# Patient Record
Sex: FEMALE
[De-identification: names, ages, dates, MRNs, and addresses within clinical notes are randomized per-mention and may not be internally consistent; named-entity substitution may affect disease eponyms.]

---

## 2018-07-13 ENCOUNTER — HOSPITAL ENCOUNTER (INPATIENT)
Dept: HOSPITAL 31 - C.ER | Age: 58
LOS: 7 days | Discharge: HOME | DRG: 885 | End: 2018-07-20
Attending: PSYCHIATRY & NEUROLOGY | Admitting: PSYCHIATRY & NEUROLOGY
Payer: MEDICARE

## 2018-07-13 VITALS — OXYGEN SATURATION: 98 %

## 2018-07-13 DIAGNOSIS — F41.8: ICD-10-CM
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
## 2018-07-13 DIAGNOSIS — F10.230: ICD-10-CM

## 2018-07-13 DIAGNOSIS — F33.2: Primary | ICD-10-CM

## 2018-07-13 DIAGNOSIS — R45.851: ICD-10-CM

## 2018-07-13 DIAGNOSIS — G47.00: ICD-10-CM

## 2018-07-13 PROCEDURE — HZ2ZZZZ DETOXIFICATION SERVICES FOR SUBSTANCE ABUSE TREATMENT: ICD-10-PCS | Performed by: PSYCHIATRY & NEUROLOGY

## 2018-07-13 PROCEDURE — GZHZZZZ GROUP PSYCHOTHERAPY: ICD-10-PCS | Performed by: PSYCHIATRY & NEUROLOGY

## 2018-07-13 PROCEDURE — HZ56ZZZ INDIVIDUAL PSYCHOTHERAPY FOR SUBSTANCE ABUSE TREATMENT, PSYCHOEDUCATION: ICD-10-PCS | Performed by: PSYCHIATRY & NEUROLOGY

## 2018-07-13 PROCEDURE — GZ56ZZZ INDIVIDUAL PSYCHOTHERAPY, SUPPORTIVE: ICD-10-PCS | Performed by: PSYCHIATRY & NEUROLOGY

## 2018-07-13 PROCEDURE — HZ59ZZZ INDIVIDUAL PSYCHOTHERAPY FOR SUBSTANCE ABUSE TREATMENT, SUPPORTIVE: ICD-10-PCS | Performed by: PSYCHIATRY & NEUROLOGY

## 2018-07-13 NOTE — C.PDOC
History Of Present Illness


56yo female, transferred in for admission after she was pre-screened in an 

outpatient facility for depression. Patient currently denies any suicidal or 

homicidal ideation. She has no medical complaints. 


Time Seen by Provider: 18 16:40


Chief Complaint (Nursing): Psychiatric Evaluation


History/Exam Limitations: no limitations


Onset/Duration Of Symptoms: Days


Current Symptoms Are (Timing): Still Present


Associated Symptoms: Depression





Past Medical History


Reviewed: Historical Data, Nursing Documentation, Vital Signs


Vital Signs: 


 Last Vital Signs











Temp  98.3 F   18 06:36


 


Pulse  75   18 09:36


 


Resp  18   18 06:36


 


BP  118/74   18 09:36


 


Pulse Ox  98   18 06:00














- Medical History


PMH: No Chronic Diseases


Surgical History: No Surg Hx


Family History: States: No Known Family Hx





- Social History


Hx Alcohol Use: Yes


Hx Substance Use: No





Review Of Systems


Except As Marked, All Systems Reviewed And Found Negative.


Constitutional: Negative for: Fever, Chills


Cardiovascular: Negative for: Chest Pain


Respiratory: Negative for: Shortness of Breath


Gastrointestinal: Negative for: Abdominal Pain


Psych: Positive for: Depression.  Negative for: Suicidal ideation





Physical Exam





- Physical Exam


Appears: Non-toxic


Skin: Normal Color


Head: Normacephalic


Eye(s): bilateral: Normal Inspection


Neck: Supple


Chest: Symmetrical


Cardiovascular: Rhythm Regular


Respiratory: Normal Breath Sounds


Gastrointestinal/Abdominal: Normal Exam, Soft, No Tenderness


Back: Normal Inspection


Extremity: Normal ROM


Neurological/Psych: Oriented x3, Other (flat affect)





ED Course And Treatment


O2 Sat by Pulse Oximetry: 98 (RA)


Pulse Ox Interpretation: Normal





Medical Decision Making


Medical Decision Makin


patient admitted under Dr. Garcia for suicidal ideation.





Disposition


Doctor Will See Patient In The: Hospital


Counseled Patient/Family Regarding: Studies Performed, Diagnosis





- Disposition


Disposition: HOSPITALIZED


Disposition Time: 06:30


Condition: GOOD





- Clinical Impression


Clinical Impression: 


 Depression








- Scribe Statement


The provider has reviewed the documentation as recorded by the Josiah Garcia


Provider Attestation: 


All medical record entries made by the Josiah were at my direction and 

personally dictated by me. I have reviewed the chart and agree that the record 

accurately reflects my personal performance of the history, physical exam, 

medical decision making, and the department course for this patient. I have 

also personally directed, reviewed, and agree with the discharge instructions 

and disposition.
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
## 2018-07-15 NOTE — PCM.PYCHPN
Psychiatric Progress Note





- Psychiatric Progress Note


Patient seen today, length of contact: 15 min


Patient Chief Complaint: 





I  was feeling depressed and suicidal.'


Problems Identified/Issues Discussed: 








Patient seen and evaluated, chart reviewed and discussed with the nurse. 


Pt reports depressed mood, feelings of hopelessness and helplessness. 


She remained isolated and withdrawn, and confined to her room.  


She still reports withdrawal symptoms.


She denies any AVH or any paranoia.


Patient is compliant with medications and denies any side effects. 


Symptoms are improving but need more time to stabilize. 


Support and psychoeducation given. 


Medication Change: Yes


Medical Record Reviewed: Yes





Mental Status Examination





- Cognitive Function


Orientation: Person, Place, Situation, Time


Memory: Intact


Attention: WNL


Concentration: Poor


Association: WNL


Fund of Knowledge: Poor





- Mood


Mood: Depressed, Anxious





- Affect


Affect: Broad, Constricted, Depressed





- Speech


Speech: Soft





- Formal Thought Process


Formal Thought Process: No Impairment





- Suicidal Ideation


Suicidal Ideation: No





- Homicidal Ideation


Homicidal Ideation: No





Goal/Treatment Plan





- Goal/Treatment Plan


Need for Continued Stay: Severe depression anxiety, Severe functional impairment


Progress Toward Problem(s) and Goals/Treatment Plan: 








Major depressive disorder recurrent severe without psychotic features


Alcohol use disorder severe


Alcohol withdrawal








-Psychotherapy


-Supportive therapy, group therapy, individual therapy


-Atarax 25 mg PO Q6 prn


-Wellbutrin 75 mg PO Daily 


-Trazodone 50 mg PO QHS prn


-Ativan prn


-Multivitamin/FA/Thiamine





- Smoking Cessation


Smoking Cessation Initiated: No

## 2018-07-16 RX ADMIN — Medication SCH TAB: at 10:29

## 2018-07-16 NOTE — PCM.PYCHPN
Psychiatric Progress Note





- Psychiatric Progress Note


Patient seen today, length of contact: 15 min


Patient Chief Complaint: 


"I am just here"


Problems Identified/Issues Discussed: 


Patient seen, chart discussed and reviewed with the nurse. The patient was seen 

in treatment team meeting. The patient states that she is feeling depressed and 

still has thoughts of hurting herself. She states that she still has trouble 

sleeping. She states that her appetite is better since coming to the hospital. 


Symptoms are improving but needs more time to stabilize. 





Mental Status Examination





- Cognitive Function


Orientation: Person, Place, Situation, Time





- Mood


Mood: Depressed, Anxious





- Affect


Affect: Broad, Constricted, Depressed





- Speech


Speech: Appropriate





- Formal Thought Process


Formal Thought Process: No Impairment





- Suicidal Ideation


Suicidal Ideation: Yes





- Homicidal Ideation


Homicidal Ideation: No





Goal/Treatment Plan





- Goal/Treatment Plan


Progress Toward Problem(s) and Goals/Treatment Plan: 


Psychotherapy, supportive therapy, group therapy, individual therapy


Atarax 25 mg PO Q6 PRN


Wellbutrin 75 mg PO daily


Trazodone 50 mg PO PRN


Ativan prn 


Multivitamin/FA/ Thiamine

## 2018-07-16 NOTE — PCM.BM
<AjDemetrice - Last Filed: 07/16/18 18:26>





Treatment Plan Problems





- Problems identified on initial assessmt


  ** Depression 


Date Initiated: 07/13/18


Time Initiated: 21:00


Assessment reference: NA


Status: Active





  ** Suicidal Ideations 


Date Initiated: 07/13/18


Time Initiated: 22:20


Assessment reference: NA


Status: Active





Treatment assets and liabiliti


Patient Assests: cooperative, self-reliant, ADL independent, negotiates basic 

needs


Patient Liabilities: live alone, financial problems, relationship conflicts, 

substance abuse, medical problems





- Milieu Protocol


Maintain good personal hygiene: daily Encourage regular showers, daily Remind 

patient to perform daily oral care, daily Assist patient to perform ADL's


Conduct patient checks and document Observation sheet: Q15 minutes


Maintain personal safety: every shift Educate patient to report safety concerns 

to staff, every shift Monitor environment for contraband/sharps


Medication safety: Monitor for expected outcome, potential side effects: every 

shift, Assess barriers to learning: every shift, Assess readiness for 

medication education: every shift


Milieu Narrative: 


Psychotherapy, supportive therapy, group therapy, individual therapy


Atarax 25 mg PO Q6 PRN


Wellbutrin 75 mg PO daily


Trazodone 50 mg PO PRN


Ativan prn 


Multivitamin/FA/ Thiamine 





Discharge/Continuing Care





- Treatment Team Participation


Patient/Family/SO Statement: 


Psychotherapy, supportive therapy, group therapy, individual therapy


Atarax 25 mg PO Q6 PRN


Wellbutrin 75 mg PO daily


Trazodone 50 mg PO PRN


Ativan prn 


Multivitamin/FA/ Thiamine 





<Rhonda Woodard - Last Filed: 07/17/18 16:47>





Family Contact


Family involvement: Patient does not wish Family/SO involvement


Family contact: Patient declines to allow family contact at present





- Goals for Treatment


Patient goals for treatment: " I want to be referred to a partial care program.

"



Discharge/Continuing Care





- Education Needs


Education Needs: Patient Medication, Patient Diagnosis/Disease Process, Patient 

Coping Skills, Patient Anger Management skills, Patient Placement options, 

Patient Community resources, Patient Activities of Daily Living





- Discharge


Discharge Criteria: Free of Suicidal thoughts, Normal sleep pattern, Ability to 

care for self, No longer exhibiting s/s of withdrawal, Reduction of target 

symptoms


Discharge to:: Shelter





- Treatment Team Participation


Discussed with Family/SO: No


Was Patient/Family/SO present at Treatment Team Meeting: Yes

## 2018-07-17 RX ADMIN — Medication SCH TAB: at 10:29

## 2018-07-17 NOTE — PCM.PYCHPN
Psychiatric Progress Note





- Psychiatric Progress Note


Patient seen today, length of contact: 15 min


Patient Chief Complaint: 


"I am ok"


Problems Identified/Issues Discussed: 


Patient seen chart discussed and reviewed with nurse. The patient states that 

she still has depressed mood. She states that she slept better overnight. The 

patient admits to still having thoughts of suicide. She denies any audio 

hallucinations. She requested a nicotine patch as she feels she is withdrawing. 


Symptoms are improving but needs more time to stabilize. 





Mental Status Examination





- Cognitive Function


Orientation: Person, Place, Situation, Time





- Mood


Mood: Depressed, Anxious





- Affect


Affect: Broad, Constricted, Depressed





- Speech


Speech: Appropriate





- Formal Thought Process


Formal Thought Process: No Impairment





- Suicidal Ideation


Suicidal Ideation: Yes





- Homicidal Ideation


Homicidal Ideation: No





Goal/Treatment Plan





- Goal/Treatment Plan


Progress Toward Problem(s) and Goals/Treatment Plan: 


Psychotherapy supportive therapy, group therapy, individual therapy


Atarax 25 mg PO Q6 PRN


Lexapro 5mg  


Trazodone 50 mg PO PRN


Ativan prn 


Multivitamin/FA/ Thiamine 


Nicotine patch


Encourage self care


Encourage participation in groups 


All risks and benefits of medications discussed with the patient

## 2018-07-18 RX ADMIN — Medication SCH TAB: at 10:05

## 2018-07-18 NOTE — PCM.PYCHPN
Psychiatric Progress Note





- Psychiatric Progress Note


Patient seen today, length of contact: 15 min


Patient Chief Complaint: 


"I am fine"


Problems Identified/Issues Discussed: 


Patient seen chart discussed and reviewed with nurse.  Patient seen chart 

discussed and reviewed with nurse. The patient states that she still has 

depressed mood. She states that she slept better overnight. The patient admits 

to still having thoughts of suicide. She states that if she were to leave the 

hospital now she would go to the liquor store. Support and psychoeducation 

provided. The patient was encouraged to leave her room and interact with the 

other patients on the unit.


Medication Change: No


Medical Record Reviewed: Yes





Mental Status Examination





- Cognitive Function


Orientation: Person, Place, Situation, Time


Memory: Intact


Attention: Poor





- Mood


Mood: Depressed, Anxious





- Affect


Affect: Broad, Constricted, Depressed





- Speech


Speech: Appropriate





- Formal Thought Process


Formal Thought Process: No Impairment





- Suicidal Ideation


Suicidal Ideation: Yes





- Homicidal Ideation


Homicidal Ideation: No





Goal/Treatment Plan





- Goal/Treatment Plan


Progress Toward Problem(s) and Goals/Treatment Plan: 


Psychotherapy supportive therapy, group therapy, individual therapy


Atarax 25 mg PO Q6 PRN


Lexapro 5mg  


Trazodone 50 mg PO PRN


Ativan prn 


Multivitamin/FA/ Thiamine 


Nicotine patch


Encourage self care


Encourage participation in groups 


All risks and benefits of medications discussed with the patient 





15 min

## 2018-07-18 NOTE — RAD
Date of service: 



07/18/2018



HISTORY:

Discharge  



COMPARISON:

No prior.



TECHNIQUE:

Chest PA and lateral



FINDINGS:



LUNGS:

No active pulmonary disease.



PLEURA:

No significant pleural effusion identified. No pneumothorax apparent.



CARDIOVASCULAR:

Normal.



OSSEOUS STRUCTURES:

Solitary [place] incompletely visualized 



Thoracolumbar scoliosis. 



VISUALIZED UPPER ABDOMEN:

Normal.



OTHER FINDINGS:

None.



IMPRESSION:

No active disease.

## 2018-07-19 VITALS — RESPIRATION RATE: 18 BRPM

## 2018-07-19 RX ADMIN — Medication SCH TAB: at 10:04

## 2018-07-19 NOTE — PCM.PYCHPN
Psychiatric Progress Note





- Psychiatric Progress Note


Patient seen today, length of contact: 15 min


Patient Chief Complaint: 


"I am fine"


Problems Identified/Issues Discussed: 


Patient seen chart discussed and reviewed with nurse.  Patient seen chart 

discussed and reviewed with nurse. The patient states that she still has 

depressed mood. She states that she slept better overnight. The patient admits 

to still having thoughts of suicide. She states that if she were to leave the 

hospital now she would go to the liquor store. Support and psychoeducation 

provided. The patient was encouraged to leave her room and interact with the 

other patients on the unit.


Medication Change: Yes


Medical Record Reviewed: Yes





Mental Status Examination





- Cognitive Function


Orientation: Person, Place, Situation, Time


Memory: Intact


Attention: WNL


Concentration: Poor


Association: WNL


Fund of Knowledge: Poor





- Mood


Mood: Depressed, Anxious





- Affect


Affect: Broad, Constricted, Depressed





- Speech


Speech: Soft





- Formal Thought Process


Formal Thought Process: No Impairment





- Suicidal Ideation


Suicidal Ideation: No





- Homicidal Ideation


Homicidal Ideation: No





Goal/Treatment Plan





- Goal/Treatment Plan


Need for Continued Stay: Severe depression anxiety, Severe functional impairment


Progress Toward Problem(s) and Goals/Treatment Plan: 


Psychotherapy supportive therapy, group therapy, individual therapy


Atarax 25 mg PO Q6 PRN


Lexapro 5mg  


Trazodone 50 mg PO PRN


Ativan prn 


Multivitamin/FA/ Thiamine 


Nicotine patch


Encourage self care


Encourage participation in groups 


All risks and benefits of medications discussed with the patient 





15 min

## 2018-07-19 NOTE — CARD
--------------- APPROVED REPORT --------------





Date of service: 07/18/2018



EKG Measurement

Heart [id number]

MT [id number]

[id number]

[id number]

[id number]



<Conclusion>

Normal sinus rhythm

Normal ECG

## 2018-07-20 VITALS — DIASTOLIC BLOOD PRESSURE: 74 MMHG | HEART RATE: 75 BPM | SYSTOLIC BLOOD PRESSURE: 118 MMHG

## 2018-07-20 VITALS — TEMPERATURE: 98.3 F

## 2018-07-20 RX ADMIN — Medication SCH TAB: at 09:10

## 2018-07-20 NOTE — PCM.PYCHDC
Mental Status Examination





- Mental Status Examination


Orientation: Person





Discharge Summary





- Discharge Note


Consultations:: List each consultation separately and include:  1. Reason for 

request.  2. Findings.  3. Follow-up


Summary of Hospital Course include:: 1. Description of specific treatment plan 

utilized for patients during their course of treatmen.  2. Summarize the time-

course for resolution of acute symptoms and/or regressed behaviors.  3. 

Describe issues identified and worked on during hospitalization.  4. Describe 

medication utilized.  5. Describe medical problems identified and treated.  6. 

Reassessment of suicide risk


Summary of Hospital Course: 











She went to [facility] rehab.





- Final Diagnosis (DSM 5)


Condition upon Discharge: GOOD


Disposition: HOME/ ROUTINE


Follow-up Treatment Plan: 


Psychotherapy supportive therapy, group therapy, individual therapy


Atarax 25 mg PO Q6 PRN


Lexapro 5mg  


Trazodone 50 mg PO PRN


Ativan prn 


Multivitamin/FA/ Thiamine 


Nicotine patch


Encourage self care


Encourage participation in groups 


All risks and benefits of medications discussed with the patient 





15 min


Prescriptions/Medication Reconciliation: 


Escitalopram [Lexapro] 10 mg PO DAILY #30 tab


traZODone [Desyrel] 100 mg PO HS PRN #30 tab


 PRN Reason: Insomnia

## 2019-05-17 ENCOUNTER — OFFICE VISIT (OUTPATIENT)
Dept: FAMILY MEDICINE CLINIC | Facility: CLINIC | Age: 59
End: 2019-05-17
Payer: MEDICARE

## 2019-05-17 VITALS
OXYGEN SATURATION: 94 % | HEART RATE: 84 BPM | TEMPERATURE: 99.4 F | RESPIRATION RATE: 22 BRPM | SYSTOLIC BLOOD PRESSURE: 108 MMHG | DIASTOLIC BLOOD PRESSURE: 56 MMHG | WEIGHT: 108 LBS

## 2019-05-17 DIAGNOSIS — J44.9 CHRONIC OBSTRUCTIVE PULMONARY DISEASE, UNSPECIFIED COPD TYPE (HCC): ICD-10-CM

## 2019-05-17 DIAGNOSIS — J20.9 ACUTE BRONCHITIS, UNSPECIFIED ORGANISM: Primary | ICD-10-CM

## 2019-05-17 PROCEDURE — 99213 OFFICE O/P EST LOW 20 MIN: CPT | Performed by: NURSE PRACTITIONER

## 2019-05-17 RX ORDER — GABAPENTIN 300 MG/1
400 CAPSULE ORAL 3 TIMES DAILY
COMMUNITY

## 2019-05-17 RX ORDER — BUPROPION HYDROCHLORIDE 300 MG/1
300 TABLET ORAL DAILY
COMMUNITY

## 2019-05-17 RX ORDER — ALBUTEROL SULFATE 90 UG/1
2 AEROSOL, METERED RESPIRATORY (INHALATION) EVERY 6 HOURS PRN
Qty: 1 INHALER | Refills: 0 | Status: SHIPPED | OUTPATIENT
Start: 2019-05-17 | End: 2022-05-23 | Stop reason: SDUPTHER

## 2019-05-17 RX ORDER — QUETIAPINE FUMARATE 25 MG/1
25 TABLET, FILM COATED ORAL 4 TIMES DAILY PRN
COMMUNITY

## 2019-05-17 RX ORDER — AZITHROMYCIN 250 MG/1
500 TABLET, FILM COATED ORAL EVERY 24 HOURS
Qty: 10 TABLET | Refills: 0 | Status: SHIPPED | OUTPATIENT
Start: 2019-05-17 | End: 2019-05-22

## 2019-05-17 RX ORDER — SERTRALINE HYDROCHLORIDE 100 MG/1
50 TABLET, FILM COATED ORAL DAILY
COMMUNITY

## 2019-05-17 RX ORDER — TRAZODONE HYDROCHLORIDE 100 MG/1
200 TABLET ORAL
COMMUNITY

## 2019-05-30 ENCOUNTER — OFFICE VISIT (OUTPATIENT)
Dept: FAMILY MEDICINE CLINIC | Facility: CLINIC | Age: 59
End: 2019-05-30
Payer: MEDICARE

## 2019-05-30 VITALS
RESPIRATION RATE: 18 BRPM | TEMPERATURE: 97.7 F | SYSTOLIC BLOOD PRESSURE: 132 MMHG | HEART RATE: 86 BPM | BODY MASS INDEX: 21.01 KG/M2 | OXYGEN SATURATION: 95 % | WEIGHT: 107 LBS | HEIGHT: 60 IN | DIASTOLIC BLOOD PRESSURE: 82 MMHG

## 2019-05-30 DIAGNOSIS — Z13.220 SCREENING FOR HYPERLIPIDEMIA: Primary | ICD-10-CM

## 2019-05-30 DIAGNOSIS — Z00.00 MEDICARE ANNUAL WELLNESS VISIT, INITIAL: ICD-10-CM

## 2019-05-30 PROCEDURE — G0438 PPPS, INITIAL VISIT: HCPCS | Performed by: FAMILY MEDICINE

## 2019-10-01 ENCOUNTER — APPOINTMENT (EMERGENCY)
Dept: RADIOLOGY | Facility: HOSPITAL | Age: 59
End: 2019-10-01
Payer: MEDICARE

## 2019-10-01 ENCOUNTER — HOSPITAL ENCOUNTER (EMERGENCY)
Facility: HOSPITAL | Age: 59
Discharge: HOME | End: 2019-10-01
Attending: EMERGENCY MEDICINE
Payer: MEDICARE

## 2019-10-01 VITALS
TEMPERATURE: 97.2 F | OXYGEN SATURATION: 95 % | SYSTOLIC BLOOD PRESSURE: 133 MMHG | RESPIRATION RATE: 18 BRPM | HEART RATE: 85 BPM | DIASTOLIC BLOOD PRESSURE: 72 MMHG

## 2019-10-01 DIAGNOSIS — M25.512 LEFT SHOULDER PAIN: Primary | ICD-10-CM

## 2019-10-01 PROCEDURE — 99283 EMERGENCY DEPT VISIT LOW MDM: CPT

## 2019-10-01 PROCEDURE — 73030 X-RAY EXAM OF SHOULDER: CPT

## 2019-10-01 PROCEDURE — 93005 ELECTROCARDIOGRAM TRACING: CPT

## 2019-10-01 PROCEDURE — 71046 X-RAY EXAM CHEST 2 VIEWS: CPT

## 2019-10-01 RX ORDER — NAPROXEN 250 MG/1
250 TABLET ORAL 2 TIMES DAILY WITH MEALS
Qty: 20 TABLET | Refills: 0 | Status: SHIPPED | OUTPATIENT
Start: 2019-10-01

## 2019-10-01 RX ORDER — CYCLOBENZAPRINE HCL 10 MG
10 TABLET ORAL 2 TIMES DAILY PRN
Qty: 20 TABLET | Refills: 0 | Status: SHIPPED | OUTPATIENT
Start: 2019-10-01 | End: 2022-05-23 | Stop reason: SDUPTHER

## 2019-10-01 NOTE — ED PROVIDER NOTES
History  Chief Complaint   Patient presents with    Shoulder Pain     pt presents to the ed with left shouler pain x 3 days  pt states its like a knife and wakes her up at night      Patient has had significant spinal surgery and has lifelong scoliosis  She states she has recently been having increased pain behind the left shoulder blade which she thinks is from over compensating  She states the pain is worse with certain positions and movement  It is also worse with movement of the left arm and that it affects her when she is trying to sleep on her side  She has had no shortness of breath or diaphoresis no nausea  The patient was seen by her therapist at [facility] today who advised her to go and get checked out  Prior to Admission Medications   Prescriptions Last Dose Informant Patient Reported? Taking? QUEtiapine (SEROquel) 25 mg tablet   Yes No   Sig: Take 25 mg by mouth 4 (four) times a day as needed   albuterol (VENTOLIN HFA) 90 mcg/act inhaler   No No   Sig: Inhale 2 puffs every 6 (six) hours as needed for wheezing   buPROPion (WELLBUTRIN XL) 300 mg 24 hr tablet   Yes No   Sig: Take 300 mg by mouth daily   gabapentin (NEURONTIN) 300 mg capsule   Yes No   Sig: Take 300 mg by mouth 3 (three) times a day   mometasone-formoterol (DULERA) 100-5 MCG/ACT inhaler   No No   Sig: Inhale 2 puffs 2 (two) times a day Rinse mouth after use     Patient not taking: Reported on 5/30/2019   sertraline (ZOLOFT) 100 mg tablet   Yes No   Sig: Take 50 mg by mouth daily   traZODone (DESYREL) 100 mg tablet   Yes No   Sig: Take 200 mg by mouth daily at bedtime as needed for sleep      Facility-Administered Medications: None       Past Medical History:   Diagnosis Date    Anxiety     Depression     Disease of thyroid gland     Substance abuse ([facility] [address] )        Past Surgical History:   Procedure Laterality Date    BACK SURGERY  01/01/1973    scolosis rods    ELBOW SURGERY  05/01/2018    WISDOM TOOTH EXTRACTION Family History   Problem Relation Age of Onset    Cancer Mother     Leukemia Father      I have reviewed and agree with the history as documented  Social History     Tobacco Use    Smoking status: Current Some Day Smoker     Packs/day: 0 50    Smokeless tobacco: Never Used   Substance Use Topics    Alcohol use: Not Currently     Comment: none 7/2018    Drug use: Not Currently        Review of Systems   Constitutional: Negative for fever  HENT: Negative for sore throat  Eyes: Negative for visual disturbance  Respiratory: Negative for cough and shortness of breath  Cardiovascular: Negative for chest pain  Gastrointestinal: Negative for abdominal pain  Genitourinary: Negative for dysuria  Musculoskeletal: Positive for arthralgias, back pain and myalgias  Negative for neck pain  Skin: Negative for rash  Neurological: Negative for light-headedness and headaches  Hematological: Does not bruise/bleed easily  Psychiatric/Behavioral: Negative for confusion  All other systems reviewed and are negative  Physical Exam  Physical Exam   Constitutional: She appears well-developed and well-nourished  HENT:   Head: Normocephalic and atraumatic  Eyes: Conjunctivae are normal    Neck: Normal range of motion  Neck supple  Cardiovascular: Normal rate, regular rhythm and normal heart sounds  Pulmonary/Chest: Effort normal and breath sounds normal    Abdominal: Soft  There is no tenderness  Musculoskeletal: Normal range of motion  She exhibits tenderness  She exhibits no edema  There is some tenderness in the left posterior ribs and scapular area  Neurological: She is alert  Skin: Skin is warm and dry  Capillary refill takes less than 2 seconds  Psychiatric: She has a normal mood and affect  Her behavior is normal    Nursing note and vitals reviewed        Vital Signs  ED Triage Vitals [10/01/19 1318]   Temperature Pulse Respirations Blood Pressure SpO2   (!) 97 2 °F (36 2 °C) 85 18 133/72 95 %      Temp Source Heart Rate Source Patient Position - Orthostatic VS BP Location FiO2 (%)   Tympanic Monitor -- -- --      Pain Score       --           Vitals:    10/01/19 1318   BP: 133/72   Pulse: 85         Visual Acuity      ED Medications  Medications - No data to display    Diagnostic Studies  Results Reviewed     None                 XR shoulder 2+ views LEFT    (Results Pending)   XR chest 2 views    (Results Pending)              Procedures  Procedures       ED Course                               MDM  Number of Diagnoses or Management Options  Diagnosis management comments: Will x-ray for possible bony involvement however I suspect muscle strain from compensation      Disposition  Final diagnoses:   Left shoulder pain     Time reflects when diagnosis was documented in both MDM as applicable and the Disposition within this note     Time User Action Codes Description Comment    10/1/2019  3:01 PM Sharyle Brunt Add [A34 448] Left shoulder pain       ED Disposition     ED Disposition Condition Date/Time Comment    Discharge Stable e Oct 1, 2019  3:01 PM Magdalena Aguirre discharge to home/self care  Follow-up Information     Follow up With Specialties Details Why Ben Echevarria MD Family Medicine Schedule an appointment as soon as possible for a visit   [facility]  [address] [place] [phone]            Patient's Medications   Discharge Prescriptions    CYCLOBENZAPRINE (FLEXERIL) 10 MG TABLET    Take 1 tablet (10 mg total) by mouth 2 (two) times a day as needed for muscle spasms       Start Date: 10/1/2019 End Date: --       Order Dose: 10 mg       Quantity: 20 tablet    Refills: 0    NAPROXEN (NAPROSYN) 250 MG TABLET    Take 1 tablet (250 mg total) by mouth 2 (two) times a day with meals       Start Date: 10/1/2019 End Date: --       Order Dose: 250 mg       Quantity: 20 tablet    Refills: 0     No discharge procedures on file      ED Provider  Electronically Signed by           Luis Lang MD  10/01/19 4801

## 2019-10-03 LAB
ATRIAL RATE: 76 BPM
P AXIS: 65 DEGREES
PR INTERVAL: 132 MS
QRS AXIS: 74 DEGREES
QRSD INTERVAL: 80 MS
QT INTERVAL: 400 MS
QTC INTERVAL: 450 MS
T WAVE AXIS: 71 DEGREES
VENTRICULAR RATE: 76 BPM

## 2019-10-03 PROCEDURE — 93010 ELECTROCARDIOGRAM REPORT: CPT | Performed by: INTERNAL MEDICINE

## 2020-02-15 ENCOUNTER — HOSPITAL ENCOUNTER (EMERGENCY)
Facility: HOSPITAL | Age: 60
Discharge: HOME | End: 2020-02-15
Attending: EMERGENCY MEDICINE | Admitting: EMERGENCY MEDICINE
Payer: MEDICARE

## 2020-02-15 ENCOUNTER — APPOINTMENT (EMERGENCY)
Dept: RADIOLOGY | Facility: HOSPITAL | Age: 60
End: 2020-02-15
Payer: MEDICARE

## 2020-02-15 VITALS
TEMPERATURE: 97.8 F | HEIGHT: 60 IN | OXYGEN SATURATION: 94 % | RESPIRATION RATE: 18 BRPM | BODY MASS INDEX: 21.6 KG/M2 | HEART RATE: 75 BPM | WEIGHT: 110 LBS | SYSTOLIC BLOOD PRESSURE: 123 MMHG | DIASTOLIC BLOOD PRESSURE: 63 MMHG

## 2020-02-15 DIAGNOSIS — J06.9 URI (UPPER RESPIRATORY INFECTION): ICD-10-CM

## 2020-02-15 DIAGNOSIS — J40 BRONCHITIS: Primary | ICD-10-CM

## 2020-02-15 LAB
FLUAV RNA NPH QL NAA+PROBE: NORMAL
FLUBV RNA NPH QL NAA+PROBE: NORMAL
RSV RNA NPH QL NAA+PROBE: NORMAL

## 2020-02-15 PROCEDURE — 99283 EMERGENCY DEPT VISIT LOW MDM: CPT

## 2020-02-15 PROCEDURE — 87631 RESP VIRUS 3-5 TARGETS: CPT | Performed by: EMERGENCY MEDICINE

## 2020-02-15 PROCEDURE — 94640 AIRWAY INHALATION TREATMENT: CPT | Performed by: EMERGENCY MEDICINE

## 2020-02-15 PROCEDURE — 71046 X-RAY EXAM CHEST 2 VIEWS: CPT

## 2020-02-15 PROCEDURE — 94640 AIRWAY INHALATION TREATMENT: CPT

## 2020-02-15 PROCEDURE — 99284 EMERGENCY DEPT VISIT MOD MDM: CPT | Performed by: EMERGENCY MEDICINE

## 2020-02-15 RX ORDER — PREDNISONE 20 MG/1
60 TABLET ORAL ONCE
Status: COMPLETED | OUTPATIENT
Start: 2020-02-15 | End: 2020-02-15

## 2020-02-15 RX ORDER — ALBUTEROL SULFATE 90 UG/1
2 AEROSOL, METERED RESPIRATORY (INHALATION) EVERY 4 HOURS PRN
Qty: 1 INHALER | Refills: 0 | Status: SHIPPED | OUTPATIENT
Start: 2020-02-15

## 2020-02-15 RX ORDER — BUPROPION HYDROCHLORIDE 150 MG/1
150 TABLET ORAL DAILY
COMMUNITY

## 2020-02-15 RX ORDER — AZITHROMYCIN 250 MG/1
500 TABLET, FILM COATED ORAL ONCE
Status: COMPLETED | OUTPATIENT
Start: 2020-02-15 | End: 2020-02-15

## 2020-02-15 RX ORDER — IPRATROPIUM BROMIDE AND ALBUTEROL SULFATE 2.5; .5 MG/3ML; MG/3ML
3 SOLUTION RESPIRATORY (INHALATION) ONCE
Status: COMPLETED | OUTPATIENT
Start: 2020-02-15 | End: 2020-02-15

## 2020-02-15 RX ORDER — HYDROXYZINE HYDROCHLORIDE 10 MG/1
10 TABLET, FILM COATED ORAL EVERY 6 HOURS PRN
COMMUNITY

## 2020-02-15 RX ORDER — PREDNISONE 20 MG/1
60 TABLET ORAL DAILY
Qty: 15 TABLET | Refills: 0 | Status: SHIPPED | OUTPATIENT
Start: 2020-02-15 | End: 2020-02-20

## 2020-02-15 RX ORDER — AZITHROMYCIN 250 MG/1
250 TABLET, FILM COATED ORAL DAILY
Qty: 4 TABLET | Refills: 0 | Status: SHIPPED | OUTPATIENT
Start: 2020-02-15 | End: 2020-02-19

## 2020-02-15 RX ADMIN — AZITHROMYCIN MONOHYDRATE 500 MG: 250 TABLET ORAL at 15:18

## 2020-02-15 RX ADMIN — IPRATROPIUM BROMIDE AND ALBUTEROL SULFATE 3 ML: 2.5; .5 SOLUTION RESPIRATORY (INHALATION) at 13:33

## 2020-02-15 RX ADMIN — PREDNISONE 60 MG: 20 TABLET ORAL at 13:33

## 2020-02-15 NOTE — ED PROVIDER NOTES
History  Chief Complaint   Patient presents with    Cold Like Symptoms     Patient here with c/ocough,SOB and congestion  Sxs for the past five days now  Taking OTc Dayquil/Nyquilwith no relief  51-year-old female presents with cough shortness of breath and congestion  Cough is non productive  She is a smoker  Denies any fevers or chills nausea vomiting chest pain diarrhea abdominal pain or any other symptoms  No sick contacts or travel history  History provided by:  Patient   used: No        Prior to Admission Medications   Prescriptions Last Dose Informant Patient Reported? Taking? QUEtiapine (SEROquel) 25 mg tablet 2/14/2020 at Unknown time  Yes Yes   Sig: Take 25 mg by mouth 4 (four) times a day as needed   albuterol (VENTOLIN HFA) 90 mcg/act inhaler Past Month at Unknown time  No Yes   Sig: Inhale 2 puffs every 6 (six) hours as needed for wheezing   buPROPion (WELLBUTRIN XL) 150 mg 24 hr tablet 2/15/2020 at Unknown time  Yes Yes   Sig: Take 150 mg by mouth daily   buPROPion (WELLBUTRIN XL) 300 mg 24 hr tablet 2/15/2020 at Unknown time  Yes Yes   Sig: Take 300 mg by mouth daily   cyclobenzaprine (FLEXERIL) 10 mg tablet Not Taking at Unknown time  No No   Sig: Take 1 tablet (10 mg total) by mouth 2 (two) times a day as needed for muscle spasms   Patient not taking: Reported on 2/15/2020   gabapentin (NEURONTIN) 300 mg capsule 2/15/2020 at Unknown time  Yes Yes   Sig: Take 400 mg by mouth 3 (three) times a day    hydrOXYzine HCL (ATARAX) 10 mg tablet 2/15/2020 at Unknown time  Yes Yes   Sig: Take 10 mg by mouth every 6 (six) hours as needed for itching   mometasone-formoterol (DULERA) 100-5 MCG/ACT inhaler Not Taking at Unknown time  No No   Sig: Inhale 2 puffs 2 (two) times a day Rinse mouth after use     Patient not taking: Reported on 5/30/2019   naproxen (NAPROSYN) 250 mg tablet Not Taking at Unknown time  No No   Sig: Take 1 tablet (250 mg total) by mouth 2 (two) times a day with meals   Patient not taking: Reported on 2/15/2020   sertraline (ZOLOFT) 100 mg tablet Not Taking at Unknown time  Yes No   Sig: Take 50 mg by mouth daily   traZODone (DESYREL) 100 mg tablet More than a month at Unknown time  Yes No   Sig: Take 200 mg by mouth daily at bedtime as needed for sleep      Facility-Administered Medications: None       Past Medical History:   Diagnosis Date    Anxiety     Depression     Disease of thyroid gland     Substance abuse ([facility] [address] )        Past Surgical History:   Procedure Laterality Date    BACK SURGERY  01/01/1973    scolosis rods    ELBOW SURGERY  05/01/2018    WISDOM TOOTH EXTRACTION         Family History   Problem Relation Age of Onset    Cancer Mother     Leukemia Father      I have reviewed and agree with the history as documented  Social History     Tobacco Use    Smoking status: Current Some Day Smoker     Packs/day: 0 50    Smokeless tobacco: Never Used   Substance Use Topics    Alcohol use: Not Currently     Comment: none 7/2018    Drug use: Not Currently       Review of Systems   Constitutional: Positive for chills  HENT: Positive for congestion, rhinorrhea and sinus pain  Eyes: Negative  Respiratory: Positive for cough, shortness of breath and wheezing  Cardiovascular: Negative  Gastrointestinal: Negative  Endocrine: Negative  Genitourinary: Negative  Musculoskeletal: Negative  Skin: Negative  Allergic/Immunologic: Negative  Neurological: Negative  Hematological: Negative  Psychiatric/Behavioral: Negative  All other systems reviewed and are negative  Physical Exam  Physical Exam   Constitutional: She is oriented to person, place, and time  She appears well-developed and well-nourished  HENT:   Head: Normocephalic and atraumatic  Eyes: Pupils are equal, round, and reactive to light  EOM are normal    Neck: Normal range of motion  Neck supple  Cardiovascular: Normal rate and regular rhythm  Pulmonary/Chest: She is in respiratory distress  She has wheezes  Abdominal: Soft  Bowel sounds are normal    Musculoskeletal: Normal range of motion  Neurological: She is alert and oriented to person, place, and time  Skin: Skin is warm and dry  Psychiatric: She has a normal mood and affect  Nursing note and vitals reviewed  Vital Signs  ED Triage Vitals [02/15/20 1316]   Temperature Pulse Respirations Blood Pressure SpO2   97 8 °F (36 6 °C) 75 18 155/76 96 %      Temp Source Heart Rate Source Patient Position - Orthostatic VS BP Location FiO2 (%)   Oral Monitor Sitting Left arm --      Pain Score       No Pain           Vitals:    02/15/20 1316 02/15/20 1520   BP: 155/76 123/63   Pulse: 75 75   Patient Position - Orthostatic VS: Sitting          Visual Acuity      ED Medications  Medications   ipratropium-albuterol (DUO-NEB) 0 5-2 5 mg/3 mL inhalation solution 3 mL (3 mL Nebulization Given 2/15/20 1333)   ipratropium-albuterol (DUO-NEB) 0 5-2 5 mg/3 mL inhalation solution 3 mL (3 mL Nebulization Given 2/15/20 1333)   predniSONE tablet 60 mg (60 mg Oral Given 2/15/20 1333)   azithromycin (ZITHROMAX) tablet 500 mg (500 mg Oral Given 2/15/20 1518)       Diagnostic Studies  Results Reviewed     Procedure Component Value Units Date/Time    Influenza A/B and RSV PCR [[id number]]  (Normal) Collected:  02/15/20 1332    Lab Status:  Final result Specimen:  Nasopharyngeal Swab Updated:  02/15/20 1420     INFLUENZA A PCR None Detected     INFLUENZA B PCR None Detected     RSV PCR None Detected                 XR chest 2 views   Final Result by Nguyễn Austin MD (02/16 1101)   Severe scoliosis status post stabilization      No acute cardiopulmonary disease        Similar to previous study            Workstation performed: [id number]                    Procedures  Procedures         ED Course                               MDM  Number of Diagnoses or Management Options  Bronchitis:   URI (upper respiratory infection):      Amount and/or Complexity of Data Reviewed  Clinical lab tests: ordered and reviewed  Tests in the radiology section of CPT®: ordered and reviewed  Tests in the medicine section of CPT®: ordered and reviewed    Patient Progress  Patient progress: stable        Disposition  Final diagnoses:   Bronchitis   URI (upper respiratory infection)     Time reflects when diagnosis was documented in both MDM as applicable and the Disposition within this note     Time User Action Codes Description Comment    2/15/2020  3:08 PM AnepuTitiTosha Add [J40] Bronchitis     2/15/2020  3:09 PM AnepuTitiTosha Add [J06 9] URI (upper respiratory infection)       ED Disposition     ED Disposition Condition Date/Time Comment    Discharge Stable Sat Feb 15, 2020  3:08 PM Alyson Carvajal discharge to home/self care  Follow-up Information     Follow up With Specialties Details Why Contact Info Additional Information    [facility] Emergency [facility]  If symptoms worsen [address] [facility], [facility] [place], [id number]          Discharge Medication List as of 2/15/2020  3:09 PM      START taking these medications    Details   !! albuterol (PROVENTIL HFA,VENTOLIN HFA) 90 mcg/act inhaler Inhale 2 puffs every 4 (four) hours as needed for wheezing, Starting Sat 2/15/2020, Print      azithromycin (ZITHROMAX) 250 mg tablet Take 1 tablet (250 mg total) by mouth daily for 4 days Take first 2 tablets together, then 1 every day until finished , Starting Sat 2/15/2020, Until Wed 2/19/2020, Print      predniSONE 20 mg tablet Take 3 tablets (60 mg total) by mouth daily for 5 days, Starting Sat 2/15/2020, Until Thu 2/20/2020, Print       !! - Potential duplicate medications found  Please discuss with provider        CONTINUE these medications which have NOT CHANGED    Details   !! albuterol (VENTOLIN HFA) 90 mcg/act inhaler Inhale 2 puffs every 6 (six) hours as needed for wheezing, Starting Fri 5/17/2019, Normal      !! buPROPion (WELLBUTRIN XL) 150 mg 24 hr tablet Take 150 mg by mouth daily, Historical Med      !! buPROPion (WELLBUTRIN XL) 300 mg 24 hr tablet Take 300 mg by mouth daily, Historical Med      gabapentin (NEURONTIN) 300 mg capsule Take 400 mg by mouth 3 (three) times a day , Historical Med      hydrOXYzine HCL (ATARAX) 10 mg tablet Take 10 mg by mouth every 6 (six) hours as needed for itching, Historical Med      QUEtiapine (SEROquel) 25 mg tablet Take 25 mg by mouth 4 (four) times a day as needed, Historical Med      cyclobenzaprine (FLEXERIL) 10 mg tablet Take 1 tablet (10 mg total) by mouth 2 (two) times a day as needed for muscle spasms, Starting Tue 10/1/2019, Print      mometasone-formoterol (DULERA) 100-5 MCG/ACT inhaler Inhale 2 puffs 2 (two) times a day Rinse mouth after use , Starting Fri 5/17/2019, Normal      naproxen (NAPROSYN) 250 mg tablet Take 1 tablet (250 mg total) by mouth 2 (two) times a day with meals, Starting Tue 10/1/2019, Print      sertraline (ZOLOFT) 100 mg tablet Take 50 mg by mouth daily, Historical Med      traZODone (DESYREL) 100 mg tablet Take 200 mg by mouth daily at bedtime as needed for sleep, Historical Med       !! - Potential duplicate medications found  Please discuss with provider  No discharge procedures on file      PDMP Review     None          ED Provider  Electronically Signed by           Reynaldo Lawrence DO  02/16/20 2351

## 2020-10-29 ENCOUNTER — TELEPHONE (OUTPATIENT)
Dept: FAMILY MEDICINE CLINIC | Facility: CLINIC | Age: 60
End: 2020-10-29

## 2020-10-29 DIAGNOSIS — Z23 NEED FOR SHINGLES VACCINE: Primary | ICD-10-CM

## 2020-10-29 RX ORDER — ZOSTER VACCINE RECOMBINANT, ADJUVANTED 50 MCG/0.5
0.5 KIT INTRAMUSCULAR ONCE
Qty: 1 EACH | Refills: 1 | Status: SHIPPED | OUTPATIENT
Start: 2020-10-29 | End: 2020-10-29

## 2021-03-15 ENCOUNTER — TELEPHONE (OUTPATIENT)
Dept: FAMILY MEDICINE CLINIC | Facility: CLINIC | Age: 61
End: 2021-03-15

## 2021-04-09 ENCOUNTER — OFFICE VISIT (OUTPATIENT)
Dept: FAMILY MEDICINE CLINIC | Facility: CLINIC | Age: 61
End: 2021-04-09

## 2021-04-09 VITALS
HEART RATE: 96 BPM | WEIGHT: 109 LBS | OXYGEN SATURATION: 96 % | BODY MASS INDEX: 21.97 KG/M2 | HEIGHT: 59 IN | TEMPERATURE: 97.7 F | RESPIRATION RATE: 16 BRPM | DIASTOLIC BLOOD PRESSURE: 82 MMHG | SYSTOLIC BLOOD PRESSURE: 112 MMHG

## 2021-04-09 DIAGNOSIS — Z13.6 SCREENING FOR CARDIOVASCULAR CONDITION: ICD-10-CM

## 2021-04-09 DIAGNOSIS — Z11.4 SCREENING FOR HIV (HUMAN IMMUNODEFICIENCY VIRUS): ICD-10-CM

## 2021-04-09 DIAGNOSIS — Z12.31 ENCOUNTER FOR SCREENING MAMMOGRAM FOR BREAST CANCER: ICD-10-CM

## 2021-04-09 DIAGNOSIS — Z12.12 SCREENING FOR COLORECTAL CANCER: ICD-10-CM

## 2021-04-09 DIAGNOSIS — Z12.2 ENCOUNTER FOR SCREENING FOR LUNG CANCER: ICD-10-CM

## 2021-04-09 DIAGNOSIS — Z00.00 ENCOUNTER FOR ANNUAL WELLNESS EXAM IN MEDICARE PATIENT: Primary | ICD-10-CM

## 2021-04-09 DIAGNOSIS — Z11.59 NEED FOR HEPATITIS C SCREENING TEST: ICD-10-CM

## 2021-04-09 DIAGNOSIS — Z12.11 SCREENING FOR COLORECTAL CANCER: ICD-10-CM

## 2021-04-09 RX ORDER — GABAPENTIN 400 MG/1
400 CAPSULE ORAL 3 TIMES DAILY
COMMUNITY
Start: 2021-04-07

## 2021-04-09 RX ORDER — HYDROXYZINE HYDROCHLORIDE 25 MG/1
25 TABLET, FILM COATED ORAL 3 TIMES DAILY PRN
COMMUNITY
Start: 2021-03-15

## 2021-04-09 NOTE — PROGRESS NOTES
Sonjacom is here for her Welcome to Medicare visit  Health Risk Assessment:   Patient rates overall health as fair  Patient feels that their physical health rating is slightly worse  Patient is satisfied with their life  Eyesight was rated as slightly worse  Hearing was rated as same  Patient feels that their emotional and mental health rating is same  Patients states they are sometimes angry  Patient states they are sometimes unusually tired/fatigued  Pain experienced in the last 7 days has been some  Patient's pain rating has been 5/10  Patient states that she has experienced no weight loss or gain in last 6 months  Depression Screening:   PHQ-2 Score: 0      Fall Risk Screening: In the past year, patient has experienced: no history of falling in past year      Urinary Incontinence Screening:   Patient has not leaked urine accidently in the last six months  Home Safety:  Patient does not have trouble with stairs inside or outside of their home  Patient has working smoke alarms and has no working carbon monoxide detector  Home safety hazards include: none  Nutrition:   Current diet is Regular  Medications:   Patient is currently taking over-the-counter supplements  OTC medications include: see medication list  Patient is able to manage medications  Activities of Daily Living (ADLs)/Instrumental Activities of Daily Living (IADLs):   Walk and transfer into and out of bed and chair?: Yes  Dress and groom yourself?: Yes    Bathe or shower yourself?: Yes    Feed yourself?  Yes  Do your laundry/housekeeping?: Yes  Manage your money, pay your bills and track your expenses?: Yes  Make your own meals?: Yes    Do your own shopping?: Yes    Previous Hospitalizations:   Any hospitalizations or ED visits within the last 12 months?: No      Advance Care Planning:   Living will: No    Durable POA for healthcare: No    Advanced directive: No      PREVENTIVE SCREENINGS      Cardiovascular Screening: General: Risks and Benefits Discussed    Due for: Lipid Panel      Colorectal Cancer Screening:     General: Screening Current      Breast Cancer Screening:     General: Risks and Benefits Discussed    Due for: Mammogram        Cervical Cancer Screening:      Due for: Cervical Pap Smear      Abdominal Aortic Aneurysm (AAA) Screening:        General: Screening Not Indicated      Lung Cancer Screening:     General: Screening Not Indicated      Hepatitis C Screening:    General: Risks and Benefits Discussed    Hep C Screening Accepted: Yes      Screening, Brief Intervention, and Referral to Treatment (SBIRT)    Screening  Typical number of drinks in a day: 0  Typical number of drinks in a week: 0  Interpretation: Low risk drinking behavior      Single Item Drug Screening:  How often have you used an illegal drug (including marijuana) or a prescription medication for non-medical reasons in the past year? never    Single Item Drug Screen Score: 0  Interpretation: Negative screen for possible drug use disorder

## 2021-04-14 ENCOUNTER — OFFICE VISIT (OUTPATIENT)
Dept: FAMILY MEDICINE CLINIC | Facility: CLINIC | Age: 61
End: 2021-04-14
Payer: COMMERCIAL

## 2021-04-14 VITALS
DIASTOLIC BLOOD PRESSURE: 80 MMHG | SYSTOLIC BLOOD PRESSURE: 122 MMHG | HEIGHT: 59 IN | TEMPERATURE: 97.4 F | WEIGHT: 109 LBS | RESPIRATION RATE: 16 BRPM | OXYGEN SATURATION: 96 % | BODY MASS INDEX: 21.97 KG/M2 | HEART RATE: 74 BPM

## 2021-04-14 DIAGNOSIS — H61.22 LEFT EAR IMPACTED CERUMEN: ICD-10-CM

## 2021-04-14 DIAGNOSIS — H61.21 RIGHT EAR IMPACTED CERUMEN: Primary | ICD-10-CM

## 2021-04-14 PROCEDURE — 99213 OFFICE O/P EST LOW 20 MIN: CPT | Performed by: FAMILY MEDICINE

## 2021-04-14 PROCEDURE — 4004F PT TOBACCO SCREEN RCVD TLK: CPT | Performed by: FAMILY MEDICINE

## 2021-04-14 PROCEDURE — 3008F BODY MASS INDEX DOCD: CPT | Performed by: FAMILY MEDICINE

## 2021-04-14 NOTE — PROGRESS NOTES
Assessment/Plan:    Failed attempt at earwax removal via ear irrigation  Inadequate preparation with Debrox  Instructed patient to continue with a week of Debrox and gentle irrigation with warm water while taking a shower before re attempting getting her ear wax removed  Also offered ENT referral, if patient prefers to have the earwax extracted by them  Diagnoses and all orders for this visit:    Right ear impacted cerumen  -     Ambulatory Referral to Otolaryngology; Future    Left ear impacted cerumen  -     Ambulatory Referral to Otolaryngology; Future          Subjective:      Patient ID: Albino Vazquez is a 64 y o  female here to flush her ears  She has been dealing with earwax since childhood  She would usually have her ear irrigated annually  She currently has no ear pain, but she endorses discomfort in the right ear  No significant impairment in hearing  She has applied Debrox inconsistently      The following portions of the patient's history were reviewed and updated as appropriate:   She  has a past medical history of Anxiety, Depression, Disease of thyroid gland, and Substance abuse ([facility] )  She   Patient Active Problem List    Diagnosis Date Noted    Acute bronchitis 05/17/2019    Chronic obstructive pulmonary disease ([facility] [address] ) 05/17/2019     She  has a past surgical history that includes Elbow surgery (05/01/2018); Back surgery (01/01/1973); and Huggins tooth extraction  Her family history includes Cancer in her mother; Leukemia in her father  She  reports that she has been smoking  She has been smoking about 1 00 pack per day  She has never used smokeless tobacco  She reports previous alcohol use  She reports previous drug use    Current Outpatient Medications   Medication Sig Dispense Refill    buPROPion (WELLBUTRIN XL) 150 mg 24 hr tablet Take 150 mg by mouth daily      buPROPion (WELLBUTRIN XL) 300 mg 24 hr tablet Take 300 mg by mouth daily      gabapentin (NEURONTIN) 400 mg capsule Take 400 mg by mouth 3 (three) times a day      hydrOXYzine HCL (ATARAX) 25 mg tablet Take 25 mg by mouth 3 (three) times a day as needed      QUEtiapine (SEROquel) 25 mg tablet Take 25 mg by mouth 4 (four) times a day as needed      albuterol (PROVENTIL HFA,VENTOLIN HFA) 90 mcg/act inhaler Inhale 2 puffs every 4 (four) hours as needed for wheezing 1 Inhaler 0    albuterol (VENTOLIN HFA) 90 mcg/act inhaler Inhale 2 puffs every 6 (six) hours as needed for wheezing (Patient not taking: Reported on 4/9/2021) 1 Inhaler 0    cyclobenzaprine (FLEXERIL) 10 mg tablet Take 1 tablet (10 mg total) by mouth 2 (two) times a day as needed for muscle spasms (Patient not taking: Reported on 2/15/2020) 20 tablet 0    gabapentin (NEURONTIN) 300 mg capsule Take 400 mg by mouth 3 (three) times a day       hydrOXYzine HCL (ATARAX) 10 mg tablet Take 10 mg by mouth every 6 (six) hours as needed for itching      mometasone-formoterol (DULERA) 100-5 MCG/ACT inhaler Inhale 2 puffs 2 (two) times a day Rinse mouth after use  (Patient not taking: Reported on 5/30/2019) 1 Inhaler 3    naproxen (NAPROSYN) 250 mg tablet Take 1 tablet (250 mg total) by mouth 2 (two) times a day with meals (Patient not taking: Reported on 2/15/2020) 20 tablet 0    sertraline (ZOLOFT) 100 mg tablet Take 50 mg by mouth daily      traZODone (DESYREL) 100 mg tablet Take 200 mg by mouth daily at bedtime as needed for sleep       No current facility-administered medications for this visit        Current Outpatient Medications on File Prior to Visit   Medication Sig    buPROPion (WELLBUTRIN XL) 150 mg 24 hr tablet Take 150 mg by mouth daily    buPROPion (WELLBUTRIN XL) 300 mg 24 hr tablet Take 300 mg by mouth daily    gabapentin (NEURONTIN) 400 mg capsule Take 400 mg by mouth 3 (three) times a day    hydrOXYzine HCL (ATARAX) 25 mg tablet Take 25 mg by mouth 3 (three) times a day as needed    QUEtiapine (SEROquel) 25 mg tablet Take 25 mg by mouth 4 (four) times a day as needed    albuterol (PROVENTIL HFA,VENTOLIN HFA) 90 mcg/act inhaler Inhale 2 puffs every 4 (four) hours as needed for wheezing    albuterol (VENTOLIN HFA) 90 mcg/act inhaler Inhale 2 puffs every 6 (six) hours as needed for wheezing (Patient not taking: Reported on 4/9/2021)    cyclobenzaprine (FLEXERIL) 10 mg tablet Take 1 tablet (10 mg total) by mouth 2 (two) times a day as needed for muscle spasms (Patient not taking: Reported on 2/15/2020)    gabapentin (NEURONTIN) 300 mg capsule Take 400 mg by mouth 3 (three) times a day     hydrOXYzine HCL (ATARAX) 10 mg tablet Take 10 mg by mouth every 6 (six) hours as needed for itching    mometasone-formoterol (DULERA) 100-5 MCG/ACT inhaler Inhale 2 puffs 2 (two) times a day Rinse mouth after use  (Patient not taking: Reported on 5/30/2019)    naproxen (NAPROSYN) 250 mg tablet Take 1 tablet (250 mg total) by mouth 2 (two) times a day with meals (Patient not taking: Reported on 2/15/2020)    sertraline (ZOLOFT) 100 mg tablet Take 50 mg by mouth daily    traZODone (DESYREL) 100 mg tablet Take 200 mg by mouth daily at bedtime as needed for sleep     No current facility-administered medications on file prior to visit  She has No Known Allergies       Review of Systems   All other systems reviewed and are negative  Objective:      /80 (BP Location: Left arm, Patient Position: Sitting, Cuff Size: Standard)   Pulse 74   Temp (!) 97 4 °F (36 3 °C) (Tympanic)   Resp 16   Ht 4' 11" (1 499 m)   Wt 49 4 kg (109 lb)   SpO2 96%   BMI 22 02 kg/m²          Physical Exam  Vitals signs and nursing note reviewed  Constitutional:       General: She is not in acute distress  Appearance: She is normal weight  She is not diaphoretic  HENT:      Right Ear: Hearing and ear canal normal  There is impacted cerumen  Left Ear: Hearing and ear canal normal  There is impacted cerumen  Cardiovascular:      Rate and Rhythm: Normal rate  Pulses: Normal pulses  Heart sounds: No murmur  Pulmonary:      Effort: Pulmonary effort is normal  No respiratory distress  Skin:     General: Skin is warm  Neurological:      Mental Status: She is alert  Psychiatric:         Mood and Affect: Mood normal          Behavior: Behavior normal          Thought Content:  Thought content normal

## 2022-04-15 ENCOUNTER — TELEPHONE (OUTPATIENT)
Dept: FAMILY MEDICINE CLINIC | Facility: CLINIC | Age: 62
End: 2022-04-15

## 2022-05-09 ENCOUNTER — OFFICE VISIT (OUTPATIENT)
Dept: FAMILY MEDICINE CLINIC | Facility: CLINIC | Age: 62
End: 2022-05-09
Payer: COMMERCIAL

## 2022-05-09 VITALS
RESPIRATION RATE: 16 BRPM | DIASTOLIC BLOOD PRESSURE: 70 MMHG | HEIGHT: 60 IN | BODY MASS INDEX: 21.65 KG/M2 | TEMPERATURE: 97.9 F | WEIGHT: 110.3 LBS | OXYGEN SATURATION: 96 % | HEART RATE: 91 BPM | SYSTOLIC BLOOD PRESSURE: 104 MMHG

## 2022-05-09 DIAGNOSIS — Z11.59 NEED FOR HEPATITIS C SCREENING TEST: ICD-10-CM

## 2022-05-09 DIAGNOSIS — Z12.11 SCREEN FOR COLON CANCER: ICD-10-CM

## 2022-05-09 DIAGNOSIS — Z12.2 SCREENING FOR LUNG CANCER: ICD-10-CM

## 2022-05-09 DIAGNOSIS — Z12.31 ENCOUNTER FOR SCREENING MAMMOGRAM FOR MALIGNANT NEOPLASM OF BREAST: ICD-10-CM

## 2022-05-09 DIAGNOSIS — Z00.00 ENCOUNTER FOR ANNUAL WELLNESS EXAM IN MEDICARE PATIENT: Primary | ICD-10-CM

## 2022-05-09 PROCEDURE — 3725F SCREEN DEPRESSION PERFORMED: CPT | Performed by: FAMILY MEDICINE

## 2022-05-09 PROCEDURE — G0439 PPPS, SUBSEQ VISIT: HCPCS | Performed by: FAMILY MEDICINE

## 2022-05-09 NOTE — PROGRESS NOTES
Assessment and Plan:     Problem List Items Addressed This Visit     None          Depression Screening and Follow-up Plan: Patient was screened for depression during today's encounter  They screened negative with a PHQ-2 score of 0  Tobacco Cessation Counseling: Tobacco cessation counseling was provided  The patient is sincerely urged to quit consumption of tobacco  She is not ready to quit tobacco      Preventive health issues were discussed with patient, and age appropriate screening tests were ordered as noted in patient's After Visit Summary  Personalized health advice and appropriate referrals for health education or preventive services given if needed, as noted in patient's After Visit Summary       History of Present Illness:     Patient presents for Medicare Annual Wellness visit    Patient Care Team:  Eli Schwartz DO as PCP - General (Family Medicine)  Charline Duarte MD as PCP - [address] [place] [address])     Problem List:     Patient Active Problem List   Diagnosis    Acute bronchitis    [facility] )      Past Medical and Surgical History:     Past Medical History:   Diagnosis Date    Anxiety     Depression     Disease of thyroid gland     Substance abuse ([facility] )      Past Surgical History:   Procedure Laterality Date    BACK SURGERY  01/01/1973    scolosis rods    ELBOW SURGERY  05/01/2018    WISDOM TOOTH EXTRACTION        Family History:     Family History   Problem Relation Age of Onset    Cancer Mother     Leukemia Father       Social History:     Social History     Socioeconomic History    Marital status:      Spouse name: None    Number of children: None    Years of education: None    Highest education level: None   Occupational History    None   Tobacco Use    Smoking status: Current Some Day Smoker     Packs/day: 1 00    Smokeless tobacco: Never Used   Substance and Sexual Activity    Alcohol use: Not Currently     Comment: none 7/2018-went to rehab    Drug use: Not Currently    Sexual activity: None   Other Topics Concern    None   Social History Narrative    None     Social Determinants of Health     Financial Resource Strain: Not on file   Food Insecurity: Not on file   Transportation Needs: Not on file   Physical Activity: Not on file   Stress: Not on file   Social Connections: Not on file   Intimate Partner Violence: Not on file   Housing Stability: Not on file      Medications and Allergies:     Current Outpatient Medications   Medication Sig Dispense Refill    albuterol (PROVENTIL HFA,VENTOLIN HFA) 90 mcg/act inhaler Inhale 2 puffs every 4 (four) hours as needed for wheezing 1 Inhaler 0    buPROPion (WELLBUTRIN XL) 150 mg 24 hr tablet Take 150 mg by mouth daily      buPROPion (WELLBUTRIN XL) 300 mg 24 hr tablet Take 300 mg by mouth daily      hydrOXYzine HCL (ATARAX) 25 mg tablet Take 25 mg by mouth 3 (three) times a day as needed      QUEtiapine (SEROquel) 25 mg tablet Take 25 mg by mouth 4 (four) times a day as needed      albuterol (VENTOLIN HFA) 90 mcg/act inhaler Inhale 2 puffs every 6 (six) hours as needed for wheezing (Patient not taking: Reported on 4/9/2021) 1 Inhaler 0    cyclobenzaprine (FLEXERIL) 10 mg tablet Take 1 tablet (10 mg total) by mouth 2 (two) times a day as needed for muscle spasms (Patient not taking: Reported on 2/15/2020) 20 tablet 0    gabapentin (NEURONTIN) 300 mg capsule Take 400 mg by mouth 3 (three) times a day  (Patient not taking: Reported on 5/9/2022 )      gabapentin (NEURONTIN) 400 mg capsule Take 400 mg by mouth 3 (three) times a day (Patient not taking: Reported on 5/9/2022 )      hydrOXYzine HCL (ATARAX) 10 mg tablet Take 10 mg by mouth every 6 (six) hours as needed for itching      mometasone-formoterol (DULERA) 100-5 MCG/ACT inhaler Inhale 2 puffs 2 (two) times a day Rinse mouth after use   (Patient not taking: Reported on 5/30/2019) 1 Inhaler 3    naproxen (NAPROSYN) 250 mg tablet Take 1 tablet (250 mg total) by mouth 2 (two) times a day with meals (Patient not taking: Reported on 2/15/2020) 20 tablet 0    sertraline (ZOLOFT) 100 mg tablet Take 50 mg by mouth daily (Patient not taking: Reported on 5/9/2022 )      traZODone (DESYREL) 100 mg tablet Take 200 mg by mouth daily at bedtime as needed for sleep (Patient not taking: Reported on 5/9/2022 )       No current facility-administered medications for this visit  No Known Allergies   Immunizations:     Immunization History   Administered Date(s) Administered    Influenza, injectable, quadrivalent, preservative free 0 5 mL 10/21/2020    Pneumococcal Polysaccharide PPV23 01/09/2020    Td (adult), adsorbed 05/28/2018      Health Maintenance:         Topic Date Due    Hepatitis C Screening  Never done    HIV Screening  Never done    Cervical Cancer Screening  Never done    Breast Cancer Screening: Mammogram  Never done    Colorectal Cancer Screening  Never done         Topic Date Due    COVID-19 Vaccine (1) Never done    DTaP,Tdap,and Td Vaccines (1 - Tdap) 05/29/2018      Medicare Health Risk Assessment:     /70 (BP Location: Left arm, Patient Position: Sitting, Cuff Size: Standard)   Pulse 91   Temp 97 9 °F (36 6 °C) (Tympanic)   Resp 16   Ht 5' (1 524 m)   Wt 50 kg (110 lb 4 8 oz)   SpO2 96%   BMI 21 54 kg/m²      Amber Mayes is here for her Subsequent Wellness visit  Last Medicare Wellness visit information reviewed, patient interviewed, no change since last AWV  Health Risk Assessment:   Patient rates overall health as good  Patient feels that their physical health rating is same  Patient is satisfied with their life  Eyesight was rated as same  Hearing was rated as slightly worse  Patient feels that their emotional and mental health rating is same  Patients states they are never, rarely angry  Patient states they are sometimes unusually tired/fatigued  Pain experienced in the last 7 days has been some  Patient's pain rating has been 3/10  Patient states that she has experienced weight loss or gain in last 6 months  Has gained weight without trying  Depression Screening:   PHQ-2 Score: 0      Fall Risk Screening: In the past year, patient has experienced: no history of falling in past year      Urinary Incontinence Screening:   Patient has not leaked urine accidently in the last six months  Home Safety:  Patient does not have trouble with stairs inside or outside of their home  Patient has working smoke alarms and has working carbon monoxide detector  Home safety hazards include: none  Nutrition:   Current diet is Regular  Medications:   Patient is currently taking over-the-counter supplements  OTC medications include: see medication list  Patient is able to manage medications  Activities of Daily Living (ADLs)/Instrumental Activities of Daily Living (IADLs):   Walk and transfer into and out of bed and chair?: Yes  Dress and groom yourself?: Yes    Bathe or shower yourself?: Yes    Feed yourself?  Yes  Do your laundry/housekeeping?: Yes  Manage your money, pay your bills and track your expenses?: Yes  Make your own meals?: Yes    Do your own shopping?: Yes    Previous Hospitalizations:   Any hospitalizations or ED visits within the last 12 months?: No      Advance Care Planning:   Living will: No    Durable POA for healthcare: No    Advanced directive: No    Five wishes given: Yes      Cognitive Screening:   Provider or family/friend/caregiver concerned regarding cognition?: No    PREVENTIVE SCREENINGS      Cardiovascular Screening:    General: Screening Not Indicated      Diabetes Screening:     General: Screening Not Indicated      Colorectal Cancer Screening:     General: Screening Not Indicated      Breast Cancer Screening:       Due for: Mammogram        Cervical Cancer Screening:      Due for: Cervical Pap Smear      Osteoporosis Screening:    General: Screening Not Indicated Abdominal Aortic Aneurysm (AAA) Screening:        General: Screening Not Indicated      Lung Cancer Screening:     General: Screening Not Indicated    Due for: Low Dose CT (LDCT)      Hepatitis C Screening:      Hep C Screening Accepted: Yes        Preventive Screening Comments: Has been smoking 1 ppd since she was a teenager    Screening, Brief Intervention, and Referral to Treatment (SBIRT)    Screening  Typical number of drinks in a day: 0  Typical number of drinks in a week: 0  Interpretation: Low risk drinking behavior  Single Item Drug Screening:  How often have you used an illegal drug (including marijuana) or a prescription medication for non-medical reasons in the past year? never    Single Item Drug Screen Score: 0  Interpretation: Negative screen for possible drug use disorder    Time Spent  Time spent providing alcohol/substance abuse assessment and intervention services: 10 minutes    Other Counseling Topics:   Regular weightbearing exercise and calcium and vitamin D intake         Gabriel Moreno, DO

## 2022-05-09 NOTE — PATIENT INSTRUCTIONS
Medicare Preventive Visit Patient Instructions  Thank you for completing your Welcome to Medicare Visit or Medicare Annual Wellness Visit today  Your next wellness visit will be due in one year (5/10/2023)  The screening/preventive services that you may require over the next 5-10 years are detailed below  Some tests may not apply to you based off risk factors and/or age  Screening tests ordered at today's visit but not completed yet may show as past due  Also, please note that scanned in results may not display below  Preventive Screenings:  Service Recommendations Previous Testing/Comments   Colorectal Cancer Screening  * Colonoscopy    * Fecal Occult Blood Test (FOBT)/Fecal Immunochemical Test (FIT)  * Fecal DNA/Cologuard Test  * Flexible Sigmoidoscopy Age: 54-65 years old   Colonoscopy: every 10 years (may be performed more frequently if at higher risk)  OR  FOBT/FIT: every 1 year  OR  Cologuard: every 3 years  OR  Sigmoidoscopy: every 5 years  Screening may be recommended earlier than age 48 if at higher risk for colorectal cancer  Also, an individualized decision between you and your healthcare provider will decide whether screening between the ages of 74-80 would be appropriate  Colonoscopy: Not on file  FOBT/FIT: Not on file  Cologuard: Not on file  Sigmoidoscopy: Not on file          Breast Cancer Screening Age: 36 years old  Frequency: every 1-2 years  Not required if history of left and right mastectomy Mammogram: Not on file        Cervical Cancer Screening Between the ages of 21-29, pap smear recommended once every 3 years  Between the ages of 33-67, can perform pap smear with HPV co-testing every 5 years     Recommendations may differ for women with a history of total hysterectomy, cervical cancer, or abnormal pap smears in past  Pap Smear: Not on file        Hepatitis C Screening Once for adults born between [facility]  More frequently in patients at high risk for Hepatitis C Hep C Antibody: Not on file        Diabetes Screening 1-2 times per year if you're at risk for diabetes or have pre-diabetes Fasting glucose: No results in last 5 years   A1C: No results in last 5 years        Cholesterol Screening Once every 5 years if you don't have a lipid disorder  May order more often based on risk factors  Lipid panel: Not on file          Other Preventive Screenings Covered by Medicare:  1  Abdominal Aortic Aneurysm (AAA) Screening: covered once if your at risk  You're considered to be at risk if you have a family history of AAA  2  Lung Cancer Screening: covers low dose CT scan once per year if you meet all of the following conditions: (1) Age 50-69; (2) No signs or symptoms of lung cancer; (3) Current smoker or have quit smoking within the last 15 years; (4) You have a tobacco smoking history of at least 30 pack years (packs per day multiplied by number of years you smoked); (5) You get a written order from a healthcare provider  3  Glaucoma Screening: covered annually if you're considered high risk: (1) You have diabetes OR (2) Family history of glaucoma OR (3)  aged 48 and older OR (3)  American aged 72 and older  3  Osteoporosis Screening: covered every 2 years if you meet one of the following conditions: (1) You're estrogen deficient and at risk for osteoporosis based off medical history and other findings; (2) Have a vertebral abnormality; (3) On glucocorticoid therapy for more than 3 months; (4) Have primary hyperparathyroidism; (5) On osteoporosis medications and need to assess response to drug therapy  · Last bone density test (DXA Scan): Not on file  5  HIV Screening: covered annually if you're between the age of 12-76  Also covered annually if you are younger than 13 and older than 72 with risk factors for HIV infection  For pregnant patients, it is covered up to 3 times per pregnancy      Immunizations:  Immunization Recommendations   Influenza Vaccine Annual influenza vaccination during flu season is recommended for all persons aged >= 6 months who do not have contraindications   Pneumococcal Vaccine (Prevnar and Pneumovax)  * Prevnar = PCV13  * Pneumovax = PPSV23   Adults 25-60 years old: 1-3 doses may be recommended based on certain risk factors  Adults 72 years old: Prevnar (PCV13) vaccine recommended followed by Pneumovax (PPSV23) vaccine  If already received PPSV23 since turning 65, then PCV13 recommended at least one year after PPSV23 dose  Hepatitis B Vaccine 3 dose series if at intermediate or high risk (ex: diabetes, end stage renal disease, liver disease)   Tetanus (Td) Vaccine - COST NOT COVERED BY MEDICARE PART B Following completion of primary series, a booster dose should be given every 10 years to maintain immunity against tetanus  Td may also be given as tetanus wound prophylaxis  Tdap Vaccine - COST NOT COVERED BY MEDICARE PART B Recommended at least once for all adults  For pregnant patients, recommended with each pregnancy  Shingles Vaccine (Shingrix) - COST NOT COVERED BY MEDICARE PART B  2 shot series recommended in those aged 48 and above     Health Maintenance Due:      Topic Date Due    Hepatitis C Screening  Never done    HIV Screening  Never done    Cervical Cancer Screening  Never done    Breast Cancer Screening: Mammogram  Never done    Colorectal Cancer Screening  Never done     Immunizations Due:      Topic Date Due    COVID-19 Vaccine (1) Never done    DTaP,Tdap,and Td Vaccines (1 - Tdap) 05/29/2018     Advance Directives   What are advance directives? Advance directives are legal documents that state your wishes and plans for medical care  These plans are made ahead of time in case you lose your ability to make decisions for yourself  Advance directives can apply to any medical decision, such as the treatments you want, and if you want to donate organs  What are the types of advance directives?   There are many types of advance directives, and each state has rules about how to use them  You may choose a combination of any of the following:  · Living will: This is a written record of the treatment you want  You can also choose which treatments you do not want, which to limit, and which to stop at a certain time  This includes surgery, medicine, IV fluid, and tube feedings  · Durable power of  for healthcare [facility]): This is a written record that states who you want to make healthcare choices for you when you are unable to make them for yourself  This person, called a proxy, is usually a family member or a friend  You may choose more than 1 proxy  · Do not resuscitate (DNR) order:  A DNR order is used in case your heart stops beating or you stop breathing  It is a request not to have certain forms of treatment, such as CPR  A DNR order may be included in other types of advance directives  · Medical directive: This covers the care that you want if you are in a coma, near death, or unable to make decisions for yourself  You can list the treatments you want for each condition  Treatment may include pain medicine, surgery, blood transfusions, dialysis, IV or tube feedings, and a ventilator (breathing machine)  · Values history: This document has questions about your views, beliefs, and how you feel and think about life  This information can help others choose the care that you would choose  Why are advance directives important? An advance directive helps you control your care  Although spoken wishes may be used, it is better to have your wishes written down  Spoken wishes can be misunderstood, or not followed  Treatments may be given even if you do not want them  An advance directive may make it easier for your family to make difficult choices about your care  Cigarette Smoking and Your Health   Risks to your health if you smoke:  Nicotine and other chemicals found in tobacco damage every cell in your body   Even if you are a light smoker, you have an increased risk for cancer, heart disease, and lung disease  If you are pregnant or have diabetes, smoking increases your risk for complications  Benefits to your health if you stop smoking:   · You decrease respiratory symptoms such as coughing, wheezing, and shortness of breath  · You reduce your risk for cancers of the lung, mouth, throat, kidney, bladder, pancreas, stomach, and cervix  If you already have cancer, you increase the benefits of chemotherapy  You also reduce your risk for cancer returning or a second cancer from developing  · You reduce your risk for heart disease, blood clots, heart attack, and stroke  · You reduce your risk for lung infections, and diseases such as pneumonia, asthma, chronic bronchitis, and emphysema  · Your circulation improves  More oxygen can be delivered to your body  If you have diabetes, you lower your risk for complications, such as kidney, artery, and eye diseases  You also lower your risk for nerve damage  Nerve damage can lead to amputations, poor vision, and blindness  · You improve your body's ability to heal and to fight infections  For more information and support to stop smoking:   · [employer]  Phone: 9- 272 - 737-6703  Web Address: www [employer]  [address] [place] [address] Dr Breen Information is for End User's use only and may not be sold, redistributed or otherwise used for commercial purposes   All illustrations and images included in CareNotes® are the copyrighted property of A D A M , Inc  or [address] [place] [address]

## 2022-05-16 ENCOUNTER — PROCEDURE VISIT (OUTPATIENT)
Dept: FAMILY MEDICINE CLINIC | Facility: CLINIC | Age: 62
End: 2022-05-16
Payer: COMMERCIAL

## 2022-05-16 VITALS
TEMPERATURE: 97.4 F | DIASTOLIC BLOOD PRESSURE: 68 MMHG | HEIGHT: 60 IN | WEIGHT: 111.1 LBS | OXYGEN SATURATION: 99 % | HEART RATE: 90 BPM | SYSTOLIC BLOOD PRESSURE: 110 MMHG | RESPIRATION RATE: 18 BRPM | BODY MASS INDEX: 21.81 KG/M2

## 2022-05-16 DIAGNOSIS — M70.22 OLECRANON BURSITIS OF LEFT ELBOW: ICD-10-CM

## 2022-05-16 DIAGNOSIS — H61.23 HEARING LOSS OF BOTH EARS DUE TO CERUMEN IMPACTION: Primary | ICD-10-CM

## 2022-05-16 DIAGNOSIS — L98.9 SKIN LESION OF BACK: ICD-10-CM

## 2022-05-16 PROCEDURE — 4004F PT TOBACCO SCREEN RCVD TLK: CPT | Performed by: FAMILY MEDICINE

## 2022-05-16 PROCEDURE — 99213 OFFICE O/P EST LOW 20 MIN: CPT | Performed by: FAMILY MEDICINE

## 2022-05-16 PROCEDURE — 69209 REMOVE IMPACTED EAR WAX UNI: CPT | Performed by: FAMILY MEDICINE

## 2022-05-16 NOTE — PROGRESS NOTES
Angel Shaw 1960 female MRN: [id number]    Family Medicine Acute Visit    ASSESSMENT/PLAN  1  Hearing loss of both ears due to cerumen impaction  · Improved after removal of cerumen impaction  · Recommend frequent use of hydrogen peroxide or debrox to clean out cerumen and RTO at first signs of hearing loss  2  Olecranon bursitis of left elbow  · Non tender, no erythema  Patient wants treatment  · Will bring back for aspiration and steroid injections  3  Skin lesion of back  · Monitor for now  · Recommend taking a photo at each visit for monitoring purposes  Future Appointments   Date Time Provider Analisa Zenobia   5/23/2022  8:00 AM Brenda Rodriguez MD [facility]          SUBJECTIVE  CC: Follow-up (Needs ear flush ,used debrox but needs ear lavage,no new food or drug allergies )      HPI:  Angel Shaw is a 63 y o  female who presents for    Ear Fullness   There is pain in both ears  This is a recurrent problem  The problem occurs constantly  There has been no fever  Associated symptoms include hearing loss and rhinorrhea  Pertinent negatives include no abdominal pain, coughing, diarrhea, ear discharge or sore throat  Associated symptoms comments: Ear fullness can't hear well as if in a fishbowl  No fever or chills  Treatments tried: has had been using debrox, has had ear flushing multiple times before  There is no history of a tympanostomy tube  Left elbow swelling  First noticed last Thursday night, it is not painful  Had surgery in 2018 due to elbow bone fracture  Does not remember if she hit her elbow or not  Skin lesion  Skin lesion on back noticed when scratching and felt a bump  Denies sun exposure to area due to previous back surgery and does not like exposing that area  Review of Systems   HENT: Positive for hearing loss and rhinorrhea  Negative for ear discharge and sore throat  Respiratory: Negative for cough      Gastrointestinal: Negative for abdominal pain and diarrhea  Historical Information   The patient history was reviewed as follows:  Past Medical History:   Diagnosis Date    Anxiety     Depression     Disease of thyroid gland     Substance abuse ([facility] [address] )          Past Surgical History:   Procedure Laterality Date    BACK SURGERY  01/01/1973    scolosis rods    ELBOW SURGERY  05/01/2018    WISDOM TOOTH EXTRACTION       Family History   Problem Relation Age of Onset    Cancer Mother     Leukemia Father       Social History   Social History     Substance and Sexual Activity   Alcohol Use Not Currently    Comment: none 7/2018-went to rehab     Social History     Substance and Sexual Activity   Drug Use Not Currently     Social History     Tobacco Use   Smoking Status Current Some Day Smoker    Packs/day: 1 00   Smokeless Tobacco Never Used       Medications:     Current Outpatient Medications:     buPROPion (WELLBUTRIN XL) 150 mg 24 hr tablet, Take 150 mg by mouth daily, Disp: , Rfl:     buPROPion (WELLBUTRIN XL) 300 mg 24 hr tablet, Take 300 mg by mouth daily, Disp: , Rfl:     cyclobenzaprine (FLEXERIL) 10 mg tablet, Take 1 tablet (10 mg total) by mouth 2 (two) times a day as needed for muscle spasms, Disp: 20 tablet, Rfl: 0    gabapentin (NEURONTIN) 300 mg capsule, Take 400 mg by mouth in the morning and 400 mg in the evening and 400 mg before bedtime  , Disp: , Rfl:     gabapentin (NEURONTIN) 400 mg capsule, Take 400 mg by mouth in the morning and 400 mg in the evening and 400 mg before bedtime  , Disp: , Rfl:     hydrOXYzine HCL (ATARAX) 10 mg tablet, Take 10 mg by mouth every 6 (six) hours as needed for itching, Disp: , Rfl:     hydrOXYzine HCL (ATARAX) 25 mg tablet, Take 25 mg by mouth 3 (three) times a day as needed, Disp: , Rfl:     QUEtiapine (SEROquel) 25 mg tablet, Take 25 mg by mouth 4 (four) times a day as needed, Disp: , Rfl:     albuterol (PROVENTIL HFA,VENTOLIN HFA) 90 mcg/act inhaler, Inhale 2 puffs every 4 (four) hours as needed for wheezing (Patient not taking: Reported on 5/16/2022), Disp: 1 Inhaler, Rfl: 0    albuterol (VENTOLIN HFA) 90 mcg/act inhaler, Inhale 2 puffs every 6 (six) hours as needed for wheezing (Patient not taking: No sig reported), Disp: 1 Inhaler, Rfl: 0    mometasone-formoterol (DULERA) 100-5 MCG/ACT inhaler, Inhale 2 puffs 2 (two) times a day Rinse mouth after use  (Patient not taking: No sig reported), Disp: 1 Inhaler, Rfl: 3    naproxen (NAPROSYN) 250 mg tablet, Take 1 tablet (250 mg total) by mouth 2 (two) times a day with meals (Patient not taking: No sig reported), Disp: 20 tablet, Rfl: 0    sertraline (ZOLOFT) 100 mg tablet, Take 50 mg by mouth daily (Patient not taking: Reported on 5/16/2022), Disp: , Rfl:     traZODone (DESYREL) 100 mg tablet, Take 200 mg by mouth daily at bedtime as needed for sleep (Patient not taking: Reported on 5/16/2022), Disp: , Rfl:     No Known Allergies    OBJECTIVE  Vitals:   Vitals:    05/16/22 1036   BP: 110/68   BP Location: Left arm   Patient Position: Sitting   Cuff Size: Standard   Pulse: 90   Resp: 18   Temp: (!) 97 4 °F (36 3 °C)   TempSrc: Tympanic   SpO2: 99%   Weight: 50 4 kg (111 lb 1 6 oz)   Height: 5' (1 524 m)         Physical Exam  Constitutional:       General: She is awake  She is not in acute distress  HENT:      Head: Normocephalic  Right Ear: Decreased hearing noted  There is impacted cerumen  Left Ear: Decreased hearing noted  There is impacted cerumen  Non tender to palpation, gelatinous feeling  Musculoskeletal:      Right elbow: No swelling  No tenderness  Left elbow: Swelling present  No tenderness  Skin:     Comments: See photo   Neurological:      Mental Status: She is alert  Psychiatric:         Behavior: Behavior is cooperative                  Akhil Chisholm, [address] [place] [address]   5/16/2022

## 2022-05-16 NOTE — PROGRESS NOTES
Ear cerumen removal    Date/Time: 5/16/2022 12:52 PM  Performed by: Julissa Myrikc MD  Authorized by: Julissa Myrick MD   Universal Protocol:  Procedure performed by: Gene Strong, medical student)  Consent: Verbal consent obtained  Risks and benefits: risks, benefits and alternatives were discussed  Consent given by: patient  Patient understanding: patient states understanding of the procedure being performed  Patient consent: the patient's understanding of the procedure matches consent given  Procedure consent: procedure consent matches procedure scheduled  Patient identity confirmed: verbally with patient      Patient location:  Clinic  Procedure details:     Local anesthetic:  None    Location:  L ear and R ear    Procedure type: irrigation only      Approach:  External    Equipment used:  Otoscope to visualize after cerumen removal  60 cc syringe with small flexible tip, luke warm water with hydrogen peroxide, softened ear wax with hydrogen peroxide drops B/L   Post-procedure details:     Complication:  None    Hearing quality:  Improved    Patient tolerance of procedure:   Tolerated well, no immediate complications

## 2022-05-23 ENCOUNTER — PROCEDURE VISIT (OUTPATIENT)
Dept: FAMILY MEDICINE CLINIC | Facility: CLINIC | Age: 62
End: 2022-05-23
Payer: COMMERCIAL

## 2022-05-23 VITALS
HEART RATE: 84 BPM | OXYGEN SATURATION: 95 % | BODY MASS INDEX: 21.99 KG/M2 | TEMPERATURE: 97.2 F | HEIGHT: 60 IN | SYSTOLIC BLOOD PRESSURE: 110 MMHG | DIASTOLIC BLOOD PRESSURE: 70 MMHG | RESPIRATION RATE: 18 BRPM | WEIGHT: 112 LBS

## 2022-05-23 DIAGNOSIS — M25.512 LEFT SHOULDER PAIN: ICD-10-CM

## 2022-05-23 DIAGNOSIS — J20.9 ACUTE BRONCHITIS, UNSPECIFIED ORGANISM: ICD-10-CM

## 2022-05-23 DIAGNOSIS — M70.22 OLECRANON BURSITIS OF LEFT ELBOW: Primary | ICD-10-CM

## 2022-05-23 PROCEDURE — 20605 DRAIN/INJ JOINT/BURSA W/O US: CPT | Performed by: FAMILY MEDICINE

## 2022-05-23 PROCEDURE — 3008F BODY MASS INDEX DOCD: CPT | Performed by: FAMILY MEDICINE

## 2022-05-23 RX ORDER — ALBUTEROL SULFATE 90 UG/1
2 AEROSOL, METERED RESPIRATORY (INHALATION) EVERY 6 HOURS PRN
Qty: 8 G | Refills: 3 | Status: SHIPPED | OUTPATIENT
Start: 2022-05-23

## 2022-05-23 RX ORDER — CYCLOBENZAPRINE HCL 10 MG
10 TABLET ORAL 2 TIMES DAILY PRN
Qty: 20 TABLET | Refills: 0 | Status: SHIPPED | OUTPATIENT
Start: 2022-05-23

## 2022-05-23 NOTE — PROGRESS NOTES
Assessment/Plan:    Acute bronchitis, unspecified organism  - albuterol inhaler  Left shoulder pain  - refill flexeril 10 mg bid prn  Other orders  -     Medium joint arthrocentesis      Medium joint arthrocentesis: L elbow  Universal Protocol:  Procedure performed by:  Consent: Verbal consent obtained  Written consent not obtained  Risks and benefits: risks, benefits and alternatives were discussed  Consent given by: patient  Patient understanding: patient states understanding of the procedure being performed  Patient identity confirmed: verbally with patient    Supporting Documentation  Indications: olecranon bursitis  Procedure Details  Location: elbow - L elbow  Needle size: 22 G  Ultrasound guidance: no  Approach: posterior  Medication group details: decadron 2 ml  Aspirate amount: 1 mL  Aspirate: bloody    Dressing:  Sterile dressing applied      CPT Code 35133      Subjective:      Patient ID: Aj Sheth is a 63 y o  female  HPI  Patient is a 64 yo female who presents to have her left elbow olecranon bursitis strained  She reports that it became more swollen last week after bumping it, denies any significant pain  She reports having surgery on elbow with hardware including rods and pins  Review of Systems   Musculoskeletal: Negative for arthralgias  Swelling to left elbow with no significant pain or tenderness         Objective:      /70 (BP Location: Left arm, Patient Position: Sitting, Cuff Size: Standard)   Pulse 84   Temp (!) 97 2 °F (36 2 °C) (Tympanic)   Resp 18   Ht 5' (1 524 m)   Wt 50 8 kg (112 lb)   SpO2 95%   BMI 21 87 kg/m²          Physical Exam  Constitutional:       General: She is not in acute distress  Appearance: Normal appearance  She is not ill-appearing, toxic-appearing or diaphoretic  HENT:      Head: Normocephalic     Musculoskeletal:      Comments: Swollen left elocranon bursitis, approx 4 cm in circumference without warmth, tenderness or erythema   Neurological:      Mental Status: She is alert and oriented to person, place, and time     Psychiatric:         Mood and Affect: Mood normal          Behavior: Behavior normal

## 2022-08-15 ENCOUNTER — HOSPITAL ENCOUNTER (OUTPATIENT)
Dept: RADIOLOGY | Facility: HOSPITAL | Age: 62
Discharge: HOME | End: 2022-08-15
Payer: COMMERCIAL

## 2022-08-15 VITALS — BODY MASS INDEX: 21.6 KG/M2 | WEIGHT: 110 LBS | HEIGHT: 60 IN

## 2022-08-15 DIAGNOSIS — Z12.31 ENCOUNTER FOR SCREENING MAMMOGRAM FOR MALIGNANT NEOPLASM OF BREAST: ICD-10-CM

## 2022-08-15 PROCEDURE — 77067 SCR MAMMO BI INCL CAD: CPT

## 2022-08-15 PROCEDURE — 77063 BREAST TOMOSYNTHESIS BI: CPT

## 2022-10-13 ENCOUNTER — VBI (OUTPATIENT)
Dept: ADMINISTRATIVE | Facility: OTHER | Age: 62
End: 2022-10-13

## 2023-01-16 ENCOUNTER — OFFICE VISIT (OUTPATIENT)
Dept: FAMILY MEDICINE CLINIC | Facility: CLINIC | Age: 63
End: 2023-01-16

## 2023-01-16 VITALS
RESPIRATION RATE: 18 BRPM | TEMPERATURE: 97.9 F | SYSTOLIC BLOOD PRESSURE: 122 MMHG | DIASTOLIC BLOOD PRESSURE: 57 MMHG | OXYGEN SATURATION: 95 % | HEART RATE: 85 BPM | BODY MASS INDEX: 22.28 KG/M2 | HEIGHT: 60 IN | WEIGHT: 113.5 LBS

## 2023-01-16 DIAGNOSIS — H61.23 BILATERAL IMPACTED CERUMEN: ICD-10-CM

## 2023-01-16 DIAGNOSIS — R09.82 POSTNASAL DRIP: Primary | ICD-10-CM

## 2023-01-16 RX ORDER — FLUTICASONE PROPIONATE 50 MCG
1 SPRAY, SUSPENSION (ML) NASAL DAILY
Qty: 11.1 ML | Refills: 1 | Status: SHIPPED | OUTPATIENT
Start: 2023-01-16

## 2023-01-16 RX ORDER — LORATADINE 10 MG/1
10 TABLET ORAL DAILY
Qty: 30 TABLET | Refills: 0 | Status: SHIPPED | OUTPATIENT
Start: 2023-01-16

## 2023-01-16 NOTE — PROGRESS NOTES
Tye [facility] [address] visit    1  Postnasal drip  -     fluticasone (FLONASE) 50 mcg/act nasal spray; 1 spray into each nostril daily  -     loratadine (CLARITIN) 10 mg tablet; Take 1 tablet (10 mg total) by mouth daily    2  Bilateral impacted cerumen        Ear cerumen removal    Date/Time: 1/16/2023 9:18 AM  Performed by: Violeta La MD  Authorized by: Violeta La MD   Universal Protocol:  Consent: Verbal consent obtained  Risks and benefits: risks, benefits and alternatives were discussed  Consent given by: patient  Patient understanding: patient states understanding of the procedure being performed  Patient consent: the patient's understanding of the procedure matches consent given  Procedure consent: procedure consent matches procedure scheduled  Required items: required blood products, implants, devices, and special equipment available  Patient identity confirmed: verbally with patient      Patient location:  Clinic  Indications / Diagnosis:  Bilateral cerumen impaction  Procedure details:     Local anesthetic:  None    Location:  L ear and R ear    Procedure type: irrigation with instrumentation      Instrumentation: curette      Approach:  External    Visualization (free text):  Visualized cerumen in both ears initially, able to see the TM  Post-procedure details:     Complication:  None    Hearing quality:  Improved    Patient tolerance of procedure: Tolerated well, no immediate complications  Comments:      Able to disimpact cerumen completely from the left side  There were some residual on the right externally that was not able to be removed with a curette  Patient was recommended to use Debrox to loosen it up  Tympanic membrane was able to be visualized on both sides at the end of the procedure  Subjective: 72-year-old female presenting for impaired hearing due to bilateral cerumen impaction  She stated that the last time she had the wax removed was about 9 to 10 months ago  Patient denied any ear pain, ear discharge  She has some postnasal drip and related cough  Review of Systems : as above     Objective:     /57   Pulse 85   Temp 97 9 °F (36 6 °C) (Tympanic)   Resp 18   Ht 5' (1 524 m)   Wt 51 5 kg (113 lb 8 oz)   SpO2 95%   BMI 22 17 kg/m²      Physical Exam  Constitutional:       General: She is not in acute distress  Appearance: Normal appearance  HENT:      Head: Normocephalic and atraumatic  Right Ear: Tympanic membrane, ear canal and external ear normal  There is impacted cerumen  Left Ear: Tympanic membrane, ear canal and external ear normal  There is impacted cerumen  Nose: Congestion present  Mouth/Throat:      Mouth: Mucous membranes are moist       Pharynx: No posterior oropharyngeal erythema  Comments: Post nasal drip  Eyes:      Pupils: Pupils are equal, round, and reactive to light  Cardiovascular:      Rate and Rhythm: Normal rate and regular rhythm  Pulses: Normal pulses  Heart sounds: Normal heart sounds  No murmur heard  Pulmonary:      Effort: Pulmonary effort is normal  No respiratory distress  Breath sounds: Normal breath sounds  No wheezing  Skin:     General: Skin is warm and dry  Neurological:      General: No focal deficit present  Mental Status: She is alert and oriented to person, place, and time     Psychiatric:         Mood and Affect: Mood normal          Behavior: Behavior normal             ** Please Note: This note has been constructed using a voice recognition system **     [address] Nikolai Velasco MD  01/16/23  9:24 AM

## 2023-03-21 DIAGNOSIS — R09.82 POSTNASAL DRIP: ICD-10-CM

## 2023-03-21 RX ORDER — FLUTICASONE PROPIONATE 50 MCG
1 SPRAY, SUSPENSION (ML) NASAL DAILY
Qty: 16 G | Refills: 3 | Status: SHIPPED | OUTPATIENT
Start: 2023-03-21

## 2023-05-24 DIAGNOSIS — R09.82 POSTNASAL DRIP: ICD-10-CM

## 2023-05-24 RX ORDER — FLUTICASONE PROPIONATE 50 MCG
1 SPRAY, SUSPENSION (ML) NASAL DAILY
Qty: 16 G | Refills: 3 | Status: SHIPPED | OUTPATIENT
Start: 2023-05-24

## 2023-05-31 ENCOUNTER — APPOINTMENT (EMERGENCY)
Dept: RADIOLOGY | Facility: HOSPITAL | Age: 63
DRG: 309 | End: 2023-05-31
Payer: COMMERCIAL

## 2023-05-31 ENCOUNTER — HOSPITAL ENCOUNTER (INPATIENT)
Facility: HOSPITAL | Age: 63
LOS: 2 days | Discharge: HOME | DRG: 309 | End: 2023-06-02
Attending: EMERGENCY MEDICINE | Admitting: FAMILY MEDICINE
Payer: COMMERCIAL

## 2023-05-31 DIAGNOSIS — E87.1 HYPONATREMIA: ICD-10-CM

## 2023-05-31 DIAGNOSIS — E53.8 VITAMIN B 12 DEFICIENCY: ICD-10-CM

## 2023-05-31 DIAGNOSIS — I47.1 SVT (SUPRAVENTRICULAR TACHYCARDIA) (HCC): Primary | ICD-10-CM

## 2023-05-31 DIAGNOSIS — I95.9 HYPOTENSION: ICD-10-CM

## 2023-05-31 DIAGNOSIS — J20.9 ACUTE BRONCHITIS, UNSPECIFIED ORGANISM: ICD-10-CM

## 2023-05-31 DIAGNOSIS — E03.9 HYPOTHYROID: ICD-10-CM

## 2023-05-31 DIAGNOSIS — K59.00 CONSTIPATION: ICD-10-CM

## 2023-05-31 DIAGNOSIS — R51.9 HEADACHE: ICD-10-CM

## 2023-05-31 DIAGNOSIS — D64.9 ANEMIA: ICD-10-CM

## 2023-05-31 DIAGNOSIS — Z72.0 NICOTINE ABUSE: ICD-10-CM

## 2023-05-31 DIAGNOSIS — R77.8 ELEVATED TROPONIN: ICD-10-CM

## 2023-05-31 DIAGNOSIS — E78.5 HLD (HYPERLIPIDEMIA): ICD-10-CM

## 2023-05-31 PROBLEM — R20.2 TINGLING OF BOTH UPPER EXTREMITIES: Status: ACTIVE | Noted: 2023-05-31

## 2023-05-31 PROBLEM — M54.50 CHRONIC LOW BACK PAIN: Status: ACTIVE | Noted: 2023-05-31

## 2023-05-31 PROBLEM — F41.8 DEPRESSION WITH ANXIETY: Status: ACTIVE | Noted: 2023-05-31

## 2023-05-31 PROBLEM — I47.10 SVT (SUPRAVENTRICULAR TACHYCARDIA): Status: ACTIVE | Noted: 2023-05-31

## 2023-05-31 PROBLEM — E87.6 HYPOKALEMIA: Status: ACTIVE | Noted: 2023-05-31

## 2023-05-31 PROBLEM — R79.89 ELEVATED TROPONIN: Status: ACTIVE | Noted: 2023-05-31

## 2023-05-31 PROBLEM — F10.11 HISTORY OF ALCOHOL ABUSE: Status: ACTIVE | Noted: 2023-05-31

## 2023-05-31 PROBLEM — F32.A DEPRESSION: Status: ACTIVE | Noted: 2023-05-31

## 2023-05-31 PROBLEM — G89.29 CHRONIC LOW BACK PAIN: Status: ACTIVE | Noted: 2023-05-31

## 2023-05-31 LAB
2HR DELTA HS TROPONIN: 104 NG/L
4HR DELTA HS TROPONIN: 191 NG/L
ALBUMIN SERPL BCP-MCNC: 4.6 G/DL (ref 3.5–5)
ALP SERPL-CCNC: 34 U/L (ref 34–104)
ALT SERPL W P-5'-P-CCNC: 17 U/L (ref 7–52)
AMPHETAMINES SERPL QL SCN: NEGATIVE
ANION GAP SERPL CALCULATED.3IONS-SCNC: 3 MMOL/L (ref 4–13)
ANION GAP SERPL CALCULATED.3IONS-SCNC: 6 MMOL/L (ref 4–13)
APTT PPP: 32 SECONDS (ref 23–37)
AST SERPL W P-5'-P-CCNC: 40 U/L (ref 13–39)
BARBITURATES UR QL: NEGATIVE
BASOPHILS # BLD AUTO: 0.13 THOUSANDS/ÂΜL (ref 0–0.1)
BASOPHILS NFR BLD AUTO: 2 % (ref 0–1)
BENZODIAZ UR QL: NEGATIVE
BILIRUB SERPL-MCNC: 0.32 MG/DL (ref 0.2–1)
BILIRUB UR QL STRIP: NEGATIVE
BUN SERPL-MCNC: 11 MG/DL (ref 5–25)
BUN SERPL-MCNC: 14 MG/DL (ref 5–25)
CALCIUM SERPL-MCNC: 8.1 MG/DL (ref 8.4–10.2)
CALCIUM SERPL-MCNC: 9.3 MG/DL (ref 8.4–10.2)
CARDIAC TROPONIN I PNL SERPL HS: 109 NG/L
CARDIAC TROPONIN I PNL SERPL HS: 196 NG/L
CARDIAC TROPONIN I PNL SERPL HS: 268 NG/L (ref 8–18)
CARDIAC TROPONIN I PNL SERPL HS: 5 NG/L
CHLORIDE SERPL-SCNC: 102 MMOL/L (ref 96–108)
CHLORIDE SERPL-SCNC: 92 MMOL/L (ref 96–108)
CLARITY UR: CLEAR
CO2 SERPL-SCNC: 28 MMOL/L (ref 21–32)
CO2 SERPL-SCNC: 30 MMOL/L (ref 21–32)
COCAINE UR QL: NEGATIVE
COLOR UR: NORMAL
CREAT SERPL-MCNC: 0.78 MG/DL (ref 0.6–1.3)
CREAT SERPL-MCNC: 0.96 MG/DL (ref 0.6–1.3)
EOSINOPHIL # BLD AUTO: 0.41 THOUSAND/ÂΜL (ref 0–0.61)
EOSINOPHIL NFR BLD AUTO: 5 % (ref 0–6)
ERYTHROCYTE [DISTWIDTH] IN BLOOD BY AUTOMATED COUNT: 17.3 % (ref 11.6–15.1)
GFR SERPL CREATININE-BSD FRML MDRD: 64 ML/MIN/1.73SQ M
GFR SERPL CREATININE-BSD FRML MDRD: 82 ML/MIN/1.73SQ M
GLUCOSE SERPL-MCNC: 109 MG/DL (ref 65–140)
GLUCOSE SERPL-MCNC: 84 MG/DL (ref 65–140)
GLUCOSE UR STRIP-MCNC: NEGATIVE MG/DL
HCT VFR BLD AUTO: 32.3 % (ref 34.8–46.1)
HGB BLD-MCNC: 10 G/DL (ref 11.5–15.4)
HGB UR QL STRIP.AUTO: NEGATIVE
IMM GRANULOCYTES # BLD AUTO: 0.02 THOUSAND/UL (ref 0–0.2)
IMM GRANULOCYTES NFR BLD AUTO: 0 % (ref 0–2)
INR PPP: 0.92 (ref 0.84–1.19)
KETONES UR STRIP-MCNC: NEGATIVE MG/DL
LACTATE SERPL-SCNC: 1 MMOL/L (ref 0.5–2)
LEUKOCYTE ESTERASE UR QL STRIP: NEGATIVE
LYMPHOCYTES # BLD AUTO: 1.98 THOUSANDS/ÂΜL (ref 0.6–4.47)
LYMPHOCYTES NFR BLD AUTO: 25 % (ref 14–44)
MAGNESIUM SERPL-MCNC: 2.2 MG/DL (ref 1.9–2.7)
MCH RBC QN AUTO: 24.8 PG (ref 26.8–34.3)
MCHC RBC AUTO-ENTMCNC: 31 G/DL (ref 31.4–37.4)
MCV RBC AUTO: 80 FL (ref 82–98)
METHADONE UR QL: NEGATIVE
MONOCYTES # BLD AUTO: 0.46 THOUSAND/ÂΜL (ref 0.17–1.22)
MONOCYTES NFR BLD AUTO: 6 % (ref 4–12)
NEUTROPHILS # BLD AUTO: 4.87 THOUSANDS/ÂΜL (ref 1.85–7.62)
NEUTS SEG NFR BLD AUTO: 62 % (ref 43–75)
NITRITE UR QL STRIP: NEGATIVE
NRBC BLD AUTO-RTO: 0 /100 WBCS
OPIATES UR QL SCN: NEGATIVE
OXYCODONE+OXYMORPHONE UR QL SCN: NEGATIVE
PCP UR QL: NEGATIVE
PH UR STRIP.AUTO: 6 [PH]
PLATELET # BLD AUTO: 343 THOUSANDS/UL (ref 149–390)
PMV BLD AUTO: 9 FL (ref 8.9–12.7)
POTASSIUM SERPL-SCNC: 3.4 MMOL/L (ref 3.5–5.3)
POTASSIUM SERPL-SCNC: 4.1 MMOL/L (ref 3.5–5.3)
PROT SERPL-MCNC: 7.6 G/DL (ref 6.4–8.4)
PROT UR STRIP-MCNC: NEGATIVE MG/DL
PROTHROMBIN TIME: 12.5 SECONDS (ref 11.6–14.5)
RBC # BLD AUTO: 4.03 MILLION/UL (ref 3.81–5.12)
SODIUM 24H UR-SCNC: 34 MOL/L
SODIUM SERPL-SCNC: 128 MMOL/L (ref 135–147)
SODIUM SERPL-SCNC: 133 MMOL/L (ref 135–147)
SP GR UR STRIP.AUTO: 1.01 (ref 1–1.03)
THC UR QL: NEGATIVE
URATE SERPL-MCNC: 3.1 MG/DL (ref 2–7.5)
UROBILINOGEN UR QL STRIP.AUTO: 0.2 E.U./DL
WBC # BLD AUTO: 7.87 THOUSAND/UL (ref 4.31–10.16)

## 2023-05-31 PROCEDURE — 80307 DRUG TEST PRSMV CHEM ANLYZR: CPT | Performed by: NURSE PRACTITIONER

## 2023-05-31 PROCEDURE — 84300 ASSAY OF URINE SODIUM: CPT | Performed by: NURSE PRACTITIONER

## 2023-05-31 PROCEDURE — 84484 ASSAY OF TROPONIN QUANT: CPT | Performed by: EMERGENCY MEDICINE

## 2023-05-31 PROCEDURE — 82607 VITAMIN B-12: CPT | Performed by: NURSE PRACTITIONER

## 2023-05-31 PROCEDURE — 85730 THROMBOPLASTIN TIME PARTIAL: CPT | Performed by: EMERGENCY MEDICINE

## 2023-05-31 PROCEDURE — 83550 IRON BINDING TEST: CPT | Performed by: NURSE PRACTITIONER

## 2023-05-31 PROCEDURE — 82746 ASSAY OF FOLIC ACID SERUM: CPT | Performed by: NURSE PRACTITIONER

## 2023-05-31 PROCEDURE — 84484 ASSAY OF TROPONIN QUANT: CPT | Performed by: NURSE PRACTITIONER

## 2023-05-31 PROCEDURE — 83930 ASSAY OF BLOOD OSMOLALITY: CPT | Performed by: NURSE PRACTITIONER

## 2023-05-31 PROCEDURE — 99285 EMERGENCY DEPT VISIT HI MDM: CPT

## 2023-05-31 PROCEDURE — 83540 ASSAY OF IRON: CPT | Performed by: NURSE PRACTITIONER

## 2023-05-31 PROCEDURE — 36415 COLL VENOUS BLD VENIPUNCTURE: CPT | Performed by: EMERGENCY MEDICINE

## 2023-05-31 PROCEDURE — 83036 HEMOGLOBIN GLYCOSYLATED A1C: CPT | Performed by: NURSE PRACTITIONER

## 2023-05-31 PROCEDURE — 81003 URINALYSIS AUTO W/O SCOPE: CPT | Performed by: NURSE PRACTITIONER

## 2023-05-31 PROCEDURE — 84550 ASSAY OF BLOOD/URIC ACID: CPT | Performed by: NURSE PRACTITIONER

## 2023-05-31 PROCEDURE — 83605 ASSAY OF LACTIC ACID: CPT | Performed by: EMERGENCY MEDICINE

## 2023-05-31 PROCEDURE — 85025 COMPLETE CBC W/AUTO DIFF WBC: CPT | Performed by: EMERGENCY MEDICINE

## 2023-05-31 PROCEDURE — 80048 BASIC METABOLIC PNL TOTAL CA: CPT | Performed by: NURSE PRACTITIONER

## 2023-05-31 PROCEDURE — 82728 ASSAY OF FERRITIN: CPT | Performed by: NURSE PRACTITIONER

## 2023-05-31 PROCEDURE — 87040 BLOOD CULTURE FOR BACTERIA: CPT | Performed by: EMERGENCY MEDICINE

## 2023-05-31 PROCEDURE — 70450 CT HEAD/BRAIN W/O DYE: CPT

## 2023-05-31 PROCEDURE — 80053 COMPREHEN METABOLIC PANEL: CPT | Performed by: EMERGENCY MEDICINE

## 2023-05-31 PROCEDURE — 93005 ELECTROCARDIOGRAM TRACING: CPT

## 2023-05-31 PROCEDURE — 96361 HYDRATE IV INFUSION ADD-ON: CPT

## 2023-05-31 PROCEDURE — 85610 PROTHROMBIN TIME: CPT | Performed by: EMERGENCY MEDICINE

## 2023-05-31 PROCEDURE — 99222 1ST HOSP IP/OBS MODERATE 55: CPT | Performed by: NURSE PRACTITIONER

## 2023-05-31 PROCEDURE — 71045 X-RAY EXAM CHEST 1 VIEW: CPT

## 2023-05-31 PROCEDURE — 96374 THER/PROPH/DIAG INJ IV PUSH: CPT

## 2023-05-31 PROCEDURE — 83935 ASSAY OF URINE OSMOLALITY: CPT | Performed by: NURSE PRACTITIONER

## 2023-05-31 PROCEDURE — 83735 ASSAY OF MAGNESIUM: CPT | Performed by: EMERGENCY MEDICINE

## 2023-05-31 RX ORDER — GABAPENTIN 400 MG/1
400 CAPSULE ORAL 2 TIMES DAILY
Status: DISCONTINUED | OUTPATIENT
Start: 2023-05-31 | End: 2023-06-02 | Stop reason: HOSPADM

## 2023-05-31 RX ORDER — QUETIAPINE FUMARATE 25 MG/1
25 TABLET, FILM COATED ORAL
COMMUNITY

## 2023-05-31 RX ORDER — FLUTICASONE PROPIONATE 50 MCG
1 SPRAY, SUSPENSION (ML) NASAL DAILY
Status: DISCONTINUED | OUTPATIENT
Start: 2023-05-31 | End: 2023-06-02 | Stop reason: HOSPADM

## 2023-05-31 RX ORDER — POTASSIUM CHLORIDE 20 MEQ/1
20 TABLET, EXTENDED RELEASE ORAL ONCE
Status: COMPLETED | OUTPATIENT
Start: 2023-05-31 | End: 2023-05-31

## 2023-05-31 RX ORDER — ENOXAPARIN SODIUM 100 MG/ML
40 INJECTION SUBCUTANEOUS DAILY
Status: DISCONTINUED | OUTPATIENT
Start: 2023-06-01 | End: 2023-06-01

## 2023-05-31 RX ORDER — ASPIRIN 81 MG/1
324 TABLET, CHEWABLE ORAL ONCE
Status: COMPLETED | OUTPATIENT
Start: 2023-05-31 | End: 2023-05-31

## 2023-05-31 RX ORDER — POTASSIUM CHLORIDE 20 MEQ/1
40 TABLET, EXTENDED RELEASE ORAL ONCE
Status: COMPLETED | OUTPATIENT
Start: 2023-05-31 | End: 2023-05-31

## 2023-05-31 RX ORDER — NICOTINE 21 MG/24HR
1 PATCH, TRANSDERMAL 24 HOURS TRANSDERMAL DAILY
Status: DISCONTINUED | OUTPATIENT
Start: 2023-05-31 | End: 2023-06-02 | Stop reason: HOSPADM

## 2023-05-31 RX ORDER — QUETIAPINE FUMARATE 25 MG/1
25 TABLET, FILM COATED ORAL
Status: DISCONTINUED | OUTPATIENT
Start: 2023-05-31 | End: 2023-06-02 | Stop reason: HOSPADM

## 2023-05-31 RX ORDER — SODIUM CHLORIDE 9 MG/ML
150 INJECTION, SOLUTION INTRAVENOUS CONTINUOUS
Status: DISCONTINUED | OUTPATIENT
Start: 2023-05-31 | End: 2023-05-31

## 2023-05-31 RX ORDER — SODIUM CHLORIDE 9 MG/ML
50 INJECTION, SOLUTION INTRAVENOUS CONTINUOUS
Status: DISCONTINUED | OUTPATIENT
Start: 2023-05-31 | End: 2023-06-01

## 2023-05-31 RX ORDER — LORATADINE 10 MG/1
10 TABLET ORAL DAILY
Status: DISCONTINUED | OUTPATIENT
Start: 2023-06-01 | End: 2023-06-02 | Stop reason: HOSPADM

## 2023-05-31 RX ORDER — ADENOSINE 3 MG/ML
6 INJECTION INTRAVENOUS ONCE
Status: COMPLETED | OUTPATIENT
Start: 2023-05-31 | End: 2023-05-31

## 2023-05-31 RX ORDER — HYDROXYZINE HYDROCHLORIDE 25 MG/1
25 TABLET, FILM COATED ORAL 3 TIMES DAILY PRN
Status: DISCONTINUED | OUTPATIENT
Start: 2023-05-31 | End: 2023-06-02 | Stop reason: HOSPADM

## 2023-05-31 RX ORDER — ACETAMINOPHEN 325 MG/1
650 TABLET ORAL EVERY 6 HOURS PRN
Status: DISCONTINUED | OUTPATIENT
Start: 2023-05-31 | End: 2023-06-02 | Stop reason: HOSPADM

## 2023-05-31 RX ADMIN — NICOTINE 1 PATCH: 21 PATCH, EXTENDED RELEASE TRANSDERMAL at 23:10

## 2023-05-31 RX ADMIN — ASPIRIN 81 MG CHEWABLE TABLET 324 MG: 81 TABLET CHEWABLE at 18:57

## 2023-05-31 RX ADMIN — FLUTICASONE PROPIONATE 1 SPRAY: 50 SPRAY, METERED NASAL at 22:49

## 2023-05-31 RX ADMIN — SODIUM CHLORIDE 1000 ML: 0.9 INJECTION, SOLUTION INTRAVENOUS at 17:36

## 2023-05-31 RX ADMIN — POTASSIUM CHLORIDE 40 MEQ: 1500 TABLET, EXTENDED RELEASE ORAL at 22:53

## 2023-05-31 RX ADMIN — SODIUM CHLORIDE 1000 ML: 0.9 INJECTION, SOLUTION INTRAVENOUS at 15:41

## 2023-05-31 RX ADMIN — SODIUM CHLORIDE 150 ML/HR: 0.9 INJECTION, SOLUTION INTRAVENOUS at 18:39

## 2023-05-31 RX ADMIN — GABAPENTIN 400 MG: 400 CAPSULE ORAL at 22:54

## 2023-05-31 RX ADMIN — SODIUM CHLORIDE 50 ML/HR: 0.9 INJECTION, SOLUTION INTRAVENOUS at 22:20

## 2023-05-31 RX ADMIN — ADENOSINE 6 MG: 3 INJECTION, SOLUTION INTRAVENOUS at 15:41

## 2023-05-31 RX ADMIN — POTASSIUM CHLORIDE 20 MEQ: 1500 TABLET, EXTENDED RELEASE ORAL at 16:56

## 2023-05-31 RX ADMIN — Medication 12.5 MG: at 22:48

## 2023-05-31 NOTE — ED PROVIDER NOTES
History  Chief Complaint   Patient presents with   • Headache     Pt reports 9/10 headache in her forehead that came on suddenty around lunch     Patient presents for evaluation assigning onset headache starting her forehead that came on suddenly after she finished lunch  Denies any chest pain shortness of breath or palpitations  No history of similar headaches in no apparent modifying factors  On arrival   Patient is found to be hypotensive and tachycardic  Patient does not have any history of arrhythmias  She is also feeling weak heaviness weakness bilaterally      History provided by:  Patient   used: No        Prior to Admission Medications   Prescriptions Last Dose Informant Patient Reported? Taking? albuterol (Ventolin HFA) 90 mcg/act inhaler  Self No No   Sig: Inhale 2 puffs every 6 (six) hours as needed for wheezing   fluticasone (FLONASE) 50 mcg/act nasal spray 2023  No Yes   Si SPRAY INTO EACH NOSTRIL DAILY   gabapentin (NEURONTIN) 300 mg capsule 2023 Self Yes Yes   Sig: Take 400 mg by mouth 2 (two) times a day   hydrOXYzine HCL (ATARAX) 25 mg tablet 2023 Self Yes Yes   Sig: Take 25 mg by mouth 3 (three) times a day as needed   loratadine (CLARITIN) 10 mg tablet 2023  No Yes   Sig: Take 1 tablet (10 mg total) by mouth daily   mometasone-formoterol (DULERA) 100-5 MCG/ACT inhaler Not Taking Self No No   Sig: Inhale 2 puffs 2 (two) times a day Rinse mouth after use     Patient not taking: Reported on 2019      Facility-Administered Medications: None       Past Medical History:   Diagnosis Date   • Anxiety    • Depression    • Disease of thyroid gland    • Substance abuse [facility])        Past Surgical History:   Procedure Laterality Date   • BACK SURGERY  1973    scolosis rods   • ELBOW SURGERY  2018   • WISDOM TOOTH EXTRACTION         Family History   Problem Relation Age of Onset   • Colon cancer Mother    • Breast cancer Mother    • Cancer Mother    • Leukemia Father      I have reviewed and agree with the history as documented  E-Cigarette/Vaping     E-Cigarette/Vaping Substances     Social History     Tobacco Use   • Smoking status: Some Days     Packs/day: 1 00     Types: Cigarettes   • Smokeless tobacco: Never   Substance Use Topics   • Alcohol use: Not Currently     Comment: none 7/2018-went to rehab   • Drug use: Not Currently       Review of Systems   Constitutional: Negative for chills and fever  HENT: Negative for ear pain and sore throat  Eyes: Negative for pain and visual disturbance  Respiratory: Negative for cough and shortness of breath  Cardiovascular: Negative for chest pain and palpitations  Gastrointestinal: Negative for abdominal pain and vomiting  Genitourinary: Negative for dysuria and hematuria  Musculoskeletal: Negative for arthralgias and back pain  Skin: Negative for color change and rash  Neurological: Positive for headaches  Negative for seizures and syncope  All other systems reviewed and are negative  Physical Exam  Physical Exam  Vitals and nursing note reviewed  Constitutional:       General: She is not in acute distress  HENT:      Right Ear: External ear normal       Left Ear: External ear normal       Nose: Nose normal       Mouth/Throat:      Mouth: Mucous membranes are moist       Pharynx: Oropharynx is clear  Eyes:      General: No scleral icterus  Extraocular Movements: Extraocular movements intact  Conjunctiva/sclera: Conjunctivae normal       Pupils: Pupils are equal, round, and reactive to light  Cardiovascular:      Rate and Rhythm: Regular rhythm  Tachycardia present  Pulmonary:      Effort: Pulmonary effort is normal  No respiratory distress  Breath sounds: Normal breath sounds  Abdominal:      General: Abdomen is flat  Bowel sounds are normal  There is no distension  Palpations: Abdomen is soft  Tenderness: There is no abdominal tenderness  There is no guarding or rebound  Musculoskeletal:         General: No deformity  Normal range of motion  Skin:     Capillary Refill: Capillary refill takes less than 2 seconds  Findings: No rash  Neurological:      General: No focal deficit present  Mental Status: She is alert and oriented to person, place, and time  Cranial Nerves: No cranial nerve deficit  Sensory: No sensory deficit  Motor: No weakness        Coordination: Coordination normal          Vital Signs  ED Triage Vitals [05/31/23 1506]   Temperature Pulse Respirations Blood Pressure SpO2   97 6 °F (36 4 °C) (!) 180 18 (!) 80/51 96 %      Temp Source Heart Rate Source Patient Position - Orthostatic VS BP Location FiO2 (%)   Tympanic Monitor Sitting Right arm --      Pain Score       9           Vitals:    05/31/23 1915 05/31/23 1945 05/31/23 2030 05/31/23 2110   BP: 105/62 111/61 101/62 130/83   Pulse: 68 70 68 67   Patient Position - Orthostatic VS: Lying Lying Lying          Visual Acuity      ED Medications  Medications   sodium chloride 0 9 % infusion (has no administration in time range)   fluticasone (FLONASE) 50 mcg/act nasal spray 1 spray (has no administration in time range)   gabapentin (NEURONTIN) capsule 400 mg (has no administration in time range)   loratadine (CLARITIN) tablet 10 mg (has no administration in time range)   hydrOXYzine HCL (ATARAX) tablet 25 mg (has no administration in time range)   acetaminophen (TYLENOL) tablet 650 mg (has no administration in time range)   nicotine (NICODERM CQ) 21 mg/24 hr TD 24 hr patch 1 patch (has no administration in time range)   enoxaparin (LOVENOX) subcutaneous injection 40 mg (has no administration in time range)   potassium chloride (K-DUR,KLOR-CON) CR tablet 40 mEq (has no administration in time range)   metoprolol tartrate (LOPRESSOR) partial tablet 12 5 mg (has no administration in time range)   sodium chloride 0 9 % bolus 1,000 mL (0 mL Intravenous Stopped 5/31/23 1641)   adenosine (ADENOCARD) injection 6 mg (6 mg Intravenous Given 5/31/23 1541)   potassium chloride (K-DUR,KLOR-CON) CR tablet 20 mEq (20 mEq Oral Given 5/31/23 1656)   sodium chloride 0 9 % bolus 1,000 mL (0 mL Intravenous Stopped 5/31/23 1836)   aspirin chewable tablet 324 mg (324 mg Oral Given 5/31/23 1857)       Diagnostic Studies  Results Reviewed     Procedure Component Value Units Date/Time    Uric acid [[id number]] Collected: 05/31/23 1537    Lab Status: In process Specimen: Blood from Arm, Left Updated: 05/31/23 2135    Hemoglobin A1C [[id number]] Collected: 05/31/23 1537    Lab Status: In process Specimen: Blood from Arm, Left Updated: 05/31/23 2134    HS Troponin I 4hr [[id number]]  (Abnormal) Collected: 05/31/23 1946    Lab Status: Final result Specimen: Blood from Arm, Left Updated: 05/31/23 2113     hs TnI 4hr 196 ng/L      Delta 4hr hsTnI 191 ng/L     HS Troponin I 2hr [[id number]]  (Abnormal) Collected: 05/31/23 1735    Lab Status: Final result Specimen: Blood from Arm, Left Updated: 05/31/23 1820     hs TnI 2hr 109 ng/L      Delta 2hr hsTnI 104 ng/L     HS Troponin 0hr (reflex protocol) [[id number]]  (Normal) Collected: 05/31/23 1537    Lab Status: Final result Specimen: Blood from Arm, Left Updated: 05/31/23 1612     hs TnI 0hr 5 ng/L     Lactic acid, plasma (w/reflex if result > 2 0) [[id number]]  (Normal) Collected: 05/31/23 1537    Lab Status: Final result Specimen: Blood from Arm, Left Updated: 05/31/23 1605     LACTIC ACID 1 0 mmol/L     Narrative:      Result may be elevated if tourniquet was used during collection      Comprehensive metabolic panel [[id number]]  (Abnormal) Collected: 05/31/23 1537    Lab Status: Final result Specimen: Blood from Arm, Left Updated: 05/31/23 1604     Sodium 128 mmol/L      Potassium 3 4 mmol/L      Chloride 92 mmol/L      CO2 30 mmol/L      ANION GAP 6 mmol/L      BUN 14 mg/dL      Creatinine 0 96 mg/dL      Glucose 109 mg/dL      Calcium 9 3 mg/dL AST 40 U/L      ALT 17 U/L      Alkaline Phosphatase 34 U/L      Total Protein 7 6 g/dL      Albumin 4 6 g/dL      Total Bilirubin 0 32 mg/dL      eGFR 64 ml/min/1 73sq m     Narrative:      [place] guidelines for Chronic Kidney Disease (CKD):   •  Stage 1 with normal or high GFR (GFR > 90 mL/min/1 73 square meters)  •  Stage 2 Mild CKD (GFR = 60-89 mL/min/1 73 square meters)  •  Stage 3A Moderate CKD (GFR = 45-59 mL/min/1 73 square meters)  •  Stage 3B Moderate CKD (GFR = 30-44 mL/min/1 73 square meters)  •  Stage 4 Severe CKD (GFR = 15-29 mL/min/1 73 square meters)  •  Stage 5 End Stage CKD (GFR <15 mL/min/1 73 square meters)  Note: GFR calculation is accurate only with a steady state creatinine    Magnesium [[id number]]  (Normal) Collected: 05/31/23 1537    Lab Status: Final result Specimen: Blood from Arm, Left Updated: 05/31/23 1604     Magnesium 2 2 mg/dL     Protime-INR [[id number]]  (Normal) Collected: 05/31/23 1537    Lab Status: Final result Specimen: Blood from Arm, Left Updated: 05/31/23 1600     Protime 12 5 seconds      INR 0 92    APTT [[id number]]  (Normal) Collected: 05/31/23 1537    Lab Status: Final result Specimen: Blood from Arm, Left Updated: 05/31/23 1600     PTT 32 seconds     CBC and differential [[id number]]  (Abnormal) Collected: 05/31/23 1537    Lab Status: Final result Specimen: Blood from Arm, Left Updated: 05/31/23 1548     WBC 7 87 Thousand/uL      RBC 4 03 Million/uL      Hemoglobin 10 0 g/dL      Hematocrit 32 3 %      MCV 80 fL      MCH 24 8 pg      MCHC 31 0 g/dL      RDW 17 3 %      MPV 9 0 fL      Platelets 185 Thousands/uL      nRBC 0 /100 WBCs      Neutrophils Relative 62 %      Immat GRANS % 0 %      Lymphocytes Relative 25 %      Monocytes Relative 6 %      Eosinophils Relative 5 %      Basophils Relative 2 %      Neutrophils Absolute 4 87 Thousands/µL      Immature Grans Absolute 0 02 Thousand/uL      Lymphocytes Absolute 1 98 Thousands/µL Monocytes Absolute 0 46 Thousand/µL      Eosinophils Absolute 0 41 Thousand/µL      Basophils Absolute 0 13 Thousands/µL     Blood culture #2 [[id number]] Collected: 05/31/23 1540    Lab Status: In process Specimen: Blood from Arm, Left Updated: 05/31/23 1546    Rapid drug screen, urine [[id number]]     Lab Status: No result Specimen: Urine     UA (URINE) with reflex to Scope [[id number]]     Lab Status: No result Specimen: Urine     Blood culture #1 [[id number]]     Lab Status: No result Specimen: Blood                  CT head without contrast   Final Result by Andria Arce MD (05/31 1813)      No acute intracranial abnormality                    Workstation performed: [id number]         XR chest 1 view portable    (Results Pending)              Procedures  ECG 12 Lead Documentation Only    Date/Time: 5/31/2023 5:17 PM    Performed by: Miracle Ramirez DO  Authorized by: Miracle Ramirez DO    ECG reviewed by me, the ED Provider: yes    Patient location:  ED  Interpretation:     Interpretation: abnormal    Rate:     ECG rate:  177    ECG rate assessment: tachycardic    Rhythm:     Rhythm: SVT    ST segments:     ST segments:  Non-specific  ECG 12 Lead Documentation Only    Date/Time: 5/31/2023 5:17 PM    Performed by: Miracle Ramirez DO  Authorized by: Miracle Ramirez DO    ECG reviewed by me, the ED Provider: yes    Patient location:  ED  Interpretation:     Interpretation: abnormal    Rate:     ECG rate:  85    ECG rate assessment: normal    Rhythm:     Rhythm: sinus rhythm    Ectopy:     Ectopy: none    ST segments:     ST segments:  Non-specific  CriticalCare Time    Date/Time: 5/31/2023 9:35 PM    Performed by: Miracle Ramirez DO  Authorized by: Miracle Ramirez DO    Critical care provider statement:     Critical care time (minutes):  40    Critical care time was exclusive of:  Separately billable procedures and treating other patients    Critical care was necessary to treat or prevent imminent or life-threatening deterioration of the following conditions: Hypotension and SVT  Critical care was time spent personally by me on the following activities:  Blood draw for specimens, obtaining history from patient or surrogate, development of treatment plan with patient or surrogate, discussions with consultants, evaluation of patient's response to treatment, examination of patient, review of old charts, re-evaluation of patient's condition, ordering and review of radiographic studies, ordering and review of laboratory studies, ordering and performing treatments and interventions and interpretation of cardiac output measurements             ED Course                               SBIRT 22yo+    Flowsheet Row Most Recent Value   Initial Alcohol Screen: US AUDIT-C     1  How often do you have a drink containing alcohol? 0 Filed at: 05/31/2023 1514   2  How many drinks containing alcohol do you have on a typical day you are drinking? 0 Filed at: 05/31/2023 1514   3b  FEMALE Any Age, or MALE 65+: How often do you have 4 or more drinks on one occassion? 0 Filed at: 05/31/2023 1514   Audit-C Score 0 Filed at: 05/31/2023 1514   AGUILAR: How many times in the past year have you    Used an illegal drug or used a prescription medication for non-medical reasons? Never Filed at: 05/31/2023 1514                    Medical Decision Making  Pulse ox 92% on room air indicating adequate oxygenation  CXR: NAD as read by me      Differential diagnosis include but not limited to intracranial hemorrhage, arrhythmia, ACS, dehydration    After 6 mg adenosine patient converted to normal sinus rhythm blood pressure improved  Patient reported headache resolved and she started feeling better  Discussed CT and lab results with the patient at bedside  Contacted cardiology Dr Clive Hinojosa for consult  Given the elevated second troponin pending her low blood pressure will observe on the medical service    Dr Karina Crenshaw contacting case discussed for admission to the medical service  Elevated troponin: acute illness or injury  Headache: acute illness or injury  Hypotension: acute illness or injury  SVT [facility]raventricular tachycardia) ([facility] ): acute illness or injury  Amount and/or Complexity of Data Reviewed  Labs: ordered  Radiology: ordered and independent interpretation performed  ECG/medicine tests: ordered and independent interpretation performed  Risk  OTC drugs  Prescription drug management  Decision regarding hospitalization  Disposition  Final diagnoses:   SVT (supraventricular tachycardia) ([facility] )   Hypotension   Headache   Elevated troponin     Time reflects when diagnosis was documented in both MDM as applicable and the Disposition within this note     Time User Action Codes Description Comment    5/31/2023  6:43 PM [facility] Add [I47 1] SVT (supraventricular tachycardia) ([facility] )     5/31/2023  6:43 PM Ardena Friendly Add [I95 9] Hypotension     5/31/2023  6:43 PM Ardena Friendly Add [R51 9] Headache     5/31/2023  6:43 PM Ardena Friendly Add [R77 8] Elevated troponin       ED Disposition     ED Disposition   Admit    Condition   Stable    Date/Time   Wed May 31, 2023  6:43 PM    Comment   Case was discussed with Dr Amie Gordon and the patient's admission status was agreed to be Admission Status: inpatient status to the service of Dr Amie Gordon              Follow-up Information    None         Current Discharge Medication List      CONTINUE these medications which have NOT CHANGED    Details   fluticasone (FLONASE) 50 mcg/act nasal spray 1 SPRAY INTO EACH NOSTRIL DAILY  Qty: 16 g, Refills: 3    Associated Diagnoses: Postnasal drip      gabapentin (NEURONTIN) 300 mg capsule Take 400 mg by mouth 2 (two) times a day      hydrOXYzine HCL (ATARAX) 25 mg tablet Take 25 mg by mouth 3 (three) times a day as needed      loratadine (CLARITIN) 10 mg tablet Take 1 tablet (10 mg total) by mouth daily  Qty: 30 tablet, Refills: 0    Associated Diagnoses: Postnasal drip albuterol (Ventolin HFA) 90 mcg/act inhaler Inhale 2 puffs every 6 (six) hours as needed for wheezing  Qty: 8 g, Refills: 3    Comments: Substitution to a formulary equivalent within the same pharmaceutical class is authorized  Associated Diagnoses: Acute bronchitis, unspecified organism      mometasone-formoterol (DULERA) 100-5 MCG/ACT inhaler Inhale 2 puffs 2 (two) times a day Rinse mouth after use  Qty: 1 Inhaler, Refills: 3    Associated Diagnoses: Chronic obstructive pulmonary disease, unspecified COPD type ([facility] )             No discharge procedures on file      PDMP Review     None          ED Provider  Electronically Signed by           My Chavez DO  05/31/23 2443

## 2023-06-01 ENCOUNTER — APPOINTMENT (INPATIENT)
Dept: NON INVASIVE DIAGNOSTICS | Facility: HOSPITAL | Age: 63
DRG: 309 | End: 2023-06-01
Payer: COMMERCIAL

## 2023-06-01 PROBLEM — E03.9 HYPOTHYROID: Status: ACTIVE | Noted: 2023-06-01

## 2023-06-01 PROBLEM — E87.6 HYPOKALEMIA: Status: RESOLVED | Noted: 2023-05-31 | Resolved: 2023-06-01

## 2023-06-01 LAB
ALBUMIN SERPL BCP-MCNC: 3.5 G/DL (ref 3.5–5)
ALP SERPL-CCNC: 25 U/L (ref 34–104)
ALT SERPL W P-5'-P-CCNC: 12 U/L (ref 7–52)
ANION GAP SERPL CALCULATED.3IONS-SCNC: 3 MMOL/L (ref 4–13)
AST SERPL W P-5'-P-CCNC: 30 U/L (ref 13–39)
ATRIAL RATE: 178 BPM
ATRIAL RATE: 85 BPM
BILIRUB SERPL-MCNC: 0.29 MG/DL (ref 0.2–1)
BUN SERPL-MCNC: 11 MG/DL (ref 5–25)
CALCIUM SERPL-MCNC: 7.9 MG/DL (ref 8.4–10.2)
CARDIAC TROPONIN I PNL SERPL HS: 183 NG/L (ref 8–18)
CHLORIDE SERPL-SCNC: 105 MMOL/L (ref 96–108)
CHOLEST SERPL-MCNC: 256 MG/DL
CO2 SERPL-SCNC: 28 MMOL/L (ref 21–32)
CREAT SERPL-MCNC: 0.83 MG/DL (ref 0.6–1.3)
ERYTHROCYTE [DISTWIDTH] IN BLOOD BY AUTOMATED COUNT: 17.8 % (ref 11.6–15.1)
EST. AVERAGE GLUCOSE BLD GHB EST-MCNC: 105 MG/DL
FERRITIN SERPL-MCNC: 4 NG/ML (ref 11–307)
FOLATE SERPL-MCNC: 13 NG/ML
GFR SERPL CREATININE-BSD FRML MDRD: 76 ML/MIN/1.73SQ M
GLUCOSE SERPL-MCNC: 79 MG/DL (ref 65–140)
HBA1C MFR BLD: 5.3 %
HCT VFR BLD AUTO: 26.3 % (ref 34.8–46.1)
HDLC SERPL-MCNC: 71 MG/DL
HGB BLD-MCNC: 7.8 G/DL (ref 11.5–15.4)
IRON SATN MFR SERPL: 5 % (ref 15–50)
IRON SERPL-MCNC: 19 UG/DL (ref 50–170)
LDLC SERPL CALC-MCNC: 167 MG/DL (ref 0–100)
MAGNESIUM SERPL-MCNC: 2.1 MG/DL (ref 1.9–2.7)
MCH RBC QN AUTO: 24.7 PG (ref 26.8–34.3)
MCHC RBC AUTO-ENTMCNC: 29.7 G/DL (ref 31.4–37.4)
MCV RBC AUTO: 83 FL (ref 82–98)
OSMOLALITY UR/SERPL-RTO: 282 MMOL/KG (ref 282–298)
OSMOLALITY UR: 260 MMOL/KG
P AXIS: 60 DEGREES
PLATELET # BLD AUTO: 258 THOUSANDS/UL (ref 149–390)
PMV BLD AUTO: 9 FL (ref 8.9–12.7)
POTASSIUM SERPL-SCNC: 5 MMOL/L (ref 3.5–5.3)
PR INTERVAL: 136 MS
PROT SERPL-MCNC: 5.8 G/DL (ref 6.4–8.4)
QRS AXIS: 71 DEGREES
QRS AXIS: 71 DEGREES
QRSD INTERVAL: 84 MS
QRSD INTERVAL: 88 MS
QT INTERVAL: 242 MS
QT INTERVAL: 302 MS
QTC INTERVAL: 359 MS
QTC INTERVAL: 415 MS
RBC # BLD AUTO: 3.16 MILLION/UL (ref 3.81–5.12)
SODIUM SERPL-SCNC: 136 MMOL/L (ref 135–147)
T WAVE AXIS: 212 DEGREES
T WAVE AXIS: 226 DEGREES
T4 FREE SERPL-MCNC: <0.25 NG/DL (ref 0.61–1.12)
TIBC SERPL-MCNC: 387 UG/DL (ref 250–450)
TRIGL SERPL-MCNC: 88 MG/DL
TSH SERPL DL<=0.05 MIU/L-ACNC: 143.13 UIU/ML (ref 0.45–4.5)
VENTRICULAR RATE: 177 BPM
VENTRICULAR RATE: 85 BPM
VIT B12 SERPL-MCNC: 166 PG/ML (ref 180–914)
WBC # BLD AUTO: 6.19 THOUSAND/UL (ref 4.31–10.16)

## 2023-06-01 PROCEDURE — 93306 TTE W/DOPPLER COMPLETE: CPT | Performed by: INTERNAL MEDICINE

## 2023-06-01 PROCEDURE — 85027 COMPLETE CBC AUTOMATED: CPT | Performed by: NURSE PRACTITIONER

## 2023-06-01 PROCEDURE — 99223 1ST HOSP IP/OBS HIGH 75: CPT | Performed by: INTERNAL MEDICINE

## 2023-06-01 PROCEDURE — 84484 ASSAY OF TROPONIN QUANT: CPT | Performed by: PHYSICIAN ASSISTANT

## 2023-06-01 PROCEDURE — 93306 TTE W/DOPPLER COMPLETE: CPT

## 2023-06-01 PROCEDURE — 90677 PCV20 VACCINE IM: CPT | Performed by: INTERNAL MEDICINE

## 2023-06-01 PROCEDURE — 80061 LIPID PANEL: CPT | Performed by: NURSE PRACTITIONER

## 2023-06-01 PROCEDURE — 84443 ASSAY THYROID STIM HORMONE: CPT | Performed by: NURSE PRACTITIONER

## 2023-06-01 PROCEDURE — 80053 COMPREHEN METABOLIC PANEL: CPT | Performed by: NURSE PRACTITIONER

## 2023-06-01 PROCEDURE — 99232 SBSQ HOSP IP/OBS MODERATE 35: CPT | Performed by: FAMILY MEDICINE

## 2023-06-01 PROCEDURE — 83735 ASSAY OF MAGNESIUM: CPT | Performed by: NURSE PRACTITIONER

## 2023-06-01 PROCEDURE — 84439 ASSAY OF FREE THYROXINE: CPT | Performed by: NURSE PRACTITIONER

## 2023-06-01 PROCEDURE — 93010 ELECTROCARDIOGRAM REPORT: CPT | Performed by: INTERNAL MEDICINE

## 2023-06-01 RX ORDER — ATORVASTATIN CALCIUM 40 MG/1
40 TABLET, FILM COATED ORAL
Status: DISCONTINUED | OUTPATIENT
Start: 2023-06-01 | End: 2023-06-02 | Stop reason: HOSPADM

## 2023-06-01 RX ORDER — LEVOTHYROXINE SODIUM 88 UG/1
88 TABLET ORAL
Status: DISCONTINUED | OUTPATIENT
Start: 2023-06-02 | End: 2023-06-02 | Stop reason: HOSPADM

## 2023-06-01 RX ADMIN — QUETIAPINE FUMARATE 25 MG: 25 TABLET ORAL at 22:38

## 2023-06-01 RX ADMIN — FLUTICASONE PROPIONATE 1 SPRAY: 50 SPRAY, METERED NASAL at 10:17

## 2023-06-01 RX ADMIN — LORATADINE 10 MG: 10 TABLET ORAL at 10:18

## 2023-06-01 RX ADMIN — GABAPENTIN 400 MG: 400 CAPSULE ORAL at 10:17

## 2023-06-01 RX ADMIN — PNEUMOCOCCAL 20-VALENT CONJUGATE VACCINE 0.5 ML
2.2; 2.2; 2.2; 2.2; 2.2; 2.2; 2.2; 2.2; 2.2; 2.2; 2.2; 2.2; 2.2; 2.2; 2.2; 2.2; 4.4; 2.2; 2.2; 2.2 INJECTION, SUSPENSION INTRAMUSCULAR at 10:20

## 2023-06-01 RX ADMIN — NICOTINE 1 PATCH: 21 PATCH, EXTENDED RELEASE TRANSDERMAL at 10:19

## 2023-06-01 RX ADMIN — GABAPENTIN 400 MG: 400 CAPSULE ORAL at 18:06

## 2023-06-01 RX ADMIN — ATORVASTATIN CALCIUM 40 MG: 40 TABLET, FILM COATED ORAL at 16:20

## 2023-06-01 RX ADMIN — Medication 12.5 MG: at 20:42

## 2023-06-01 RX ADMIN — IRON SUCROSE 300 MG: 20 INJECTION, SOLUTION INTRAVENOUS at 12:07

## 2023-06-01 NOTE — ASSESSMENT & PLAN NOTE
· Initial troponin 5, repeat 109-196  · Most likely type II MI secondary to SVT  · EKG showed SVT, rate 177, marked ST abnormality  · Patient denies chest pain    · Trend troponin, telemetry  · Check to lipid panel, A1c  · Check 2D echo  · Received full dose aspirin in ED

## 2023-06-01 NOTE — ASSESSMENT & PLAN NOTE
· Reports both arms were tingling when in SVT,now only reports tingling in fingers on both hands  · CT head no acute findings  · Takes gabapentin at home for back pain    Will continue  · Correct electrolytes  · Monitor symptoms

## 2023-06-01 NOTE — ASSESSMENT & PLAN NOTE
Patient presents with sudden onset of headache, dizziness, bilateral arm tingling, weakness after eating lunch  Patient arrived to ED hypotensive in   Patient reports remote history of palpitation  · Patient converted to sinus rhythm with IV adenosine in ED  · BP improved after IV fluid bolus  · Initial EKG showed SVT, rate 177  · Initial troponin 5, repeat elevated  See below  · Start patient on low-dose metoprolol  · Optimize electrolytes  Potassium 3 4  Mag 2 2  Replete potassium  · Chest x-ray ? Right-sided mass,will request stat read     · Telemetry  · Consult cardiology

## 2023-06-01 NOTE — ASSESSMENT & PLAN NOTE
Initial troponin 5, peaked at 268  · Most likely non-MI troponin elevation due to to SVT  · EKG showed SVT, rate 177, marked ST abnormality  · Patient denies chest pain  · lipid panel -    A1c 5 3  · 2D echo results pending  · Received full dose aspirin in ED

## 2023-06-01 NOTE — H&P
Greg 47  H&P  Name: Siri Coffman 63 y o  female I MRN: [id number]  Unit/Bed#: [address] [address] I Date of Admission: 5/31/2023   Date of Service: 5/31/2023 I Hospital Day: 0      Assessment/Plan   * SVT (supraventricular tachycardia) [facility])  Assessment & Plan  Patient presents with sudden onset of headache, dizziness, bilateral arm tingling, weakness after eating lunch  Patient arrived to ED hypotensive in   Patient reports remote history of palpitation  · Patient converted to sinus rhythm with IV adenosine in ED  · BP improved after IV fluid bolus  · Initial EKG showed SVT, rate 177  · Initial troponin 5, repeat elevated  See below  · Start patient on low-dose metoprolol  · Optimize electrolytes  Potassium 3 4  Mag 2 2  Replete potassium  · Chest x-ray ? Right-sided mass,will request stat read  · Telemetry  · Consult cardiology    Elevated troponin  Assessment & Plan  · Initial troponin 5, repeat 109-196  · Most likely type II MI secondary to SVT  · EKG showed SVT, rate 177, marked ST abnormality  · Patient denies chest pain  · Trend troponin, telemetry  · Check to lipid panel, A1c  · Check 2D echo  · Received full dose aspirin in ED    Hyponatremia  Assessment & Plan  · Sodium 128  · No prior labs to compare  · Received NS 2 L in ED  · Check urine sodium, urine osmole, serum osmole, uric acid, TSH, free T4  · Chest x-ray as above, pending read  · Check BMP stat and in AM  · Consult nephrology    Tingling of both upper extremities  Assessment & Plan  · Reports both arms were tingling when in SVT,now only reports tingling in fingers on both hands  · CT head no acute findings  · Takes gabapentin at home for back pain  Will continue  · Correct electrolytes  · Monitor symptoms    Chronic low back pain  Assessment & Plan  · Continue gabapentin    History of alcohol abuse  Assessment & Plan  · Reports sober for 5 years      Anemia  Assessment & Plan  · Hemoglobin 10 0   · No prior labs to compare  · Check iron panel, B12, folate, TSH, free T4  · Monitor CBC    Hypokalemia  Assessment & Plan  · As above  · Repleted  · Repeat lab in the morning    Nicotine abuse  Assessment & Plan  · Nicotine patch, smoking cessation    Depression with anxiety  Assessment & Plan  · Continue Atarax and Seroquel as needed       VTE Prophylaxis: Enoxaparin (Lovenox)  / reason for no mechanical VTE prophylaxis on Lovenox   Code Status: Full code  POLST: POLST form is not discussed and not completed at this time  Anticipated Length of Stay:  Patient will be admitted on an Inpatient basis with an anticipated length of stay of  > 2 midnights  Justification for Hospital Stay: SVT, hyponatremia, elevated troponin    Total Time for Visit, including Counseling / Coordination of Care: 45 minutes  Greater than 50% of this total time spent on direct patient counseling and coordination of care  Chief Complaint: Headache, Dizziness, arm tingling, weakness after eating lunch    History of Present Illness:    Aurora Durant is a 63 y o  female with PMH of alcohol abuse, depression with anxiety, nicotine abuse, thyroid disease, chronic low back pain who presents with sudden onset of headache, dizziness, bilateral arm tingling, weakness after eating lunch  Patient denies chest pain, SOB, nausea vomiting, fever, chills  Denies dizziness, headache currently  Reports tingling in fingers on both hands currently  Reports remote history of palpitations  Reports history of alcohol abuse, sober for 5 years  Reports smoke 1 pack a day  Patient offered no other complaints  Review of Systems:    Review of Systems   Neurological: Positive for dizziness, weakness and headaches  Tingling to arms and fingers   All other systems reviewed and are negative        Past Medical and Surgical History:     Past Medical History:   Diagnosis Date   • Anxiety    • Depression    • Disease of thyroid gland    • Substance abuse [facility])        Past Surgical History:   Procedure Laterality Date   • BACK SURGERY  01/01/1973    scolosis rods   • ELBOW SURGERY  05/01/2018   • WISDOM TOOTH EXTRACTION         Meds/Allergies:    Prior to Admission medications    Medication Sig Start Date End Date Taking? Authorizing Provider   albuterol (Ventolin HFA) 90 mcg/act inhaler Inhale 2 puffs every 6 (six) hours as needed for wheezing 5/23/22  Yes Veto Cutler MD   fluticasone [facility]) 50 mcg/act nasal spray 1 SPRAY INTO EACH NOSTRIL DAILY 5/24/23  Yes Hafsa Raines MD   gabapentin (NEURONTIN) 300 mg capsule Take 400 mg by mouth 2 (two) times a day   Yes Historical Provider, MD   hydrOXYzine HCL (ATARAX) 25 mg tablet Take 25 mg by mouth 3 (three) times a day as needed 3/15/21  Yes Historical Provider, MD   loratadine (CLARITIN) 10 mg tablet Take 1 tablet (10 mg total) by mouth daily 1/16/23  Yes Hafsa Raines MD   QUEtiapine (SEROquel) 25 mg tablet Take 25 mg by mouth daily at bedtime as needed (for sleep)   Yes Historical Provider, MD   mometasone-formoterol (DULERA) 100-5 MCG/ACT inhaler Inhale 2 puffs 2 (two) times a day Rinse mouth after use  Patient not taking: Reported on 5/30/2019 5/17/19   Bernardo Handley NP   albuterol (PROVENTIL HFA,VENTOLIN HFA) 90 mcg/act inhaler Inhale 2 puffs every 4 (four) hours as needed for wheezing  Patient not taking: Reported on 5/16/2022 2/15/20 5/31/23  Tosha Whitaker DO   buPROPion (WELLBUTRIN XL) 150 mg 24 hr tablet Take 150 mg by mouth daily  Patient not taking: Reported on 5/31/2023 5/31/23  Historical Provider, MD   buPROPion (WELLBUTRIN XL) 300 mg 24 hr tablet Take 300 mg by mouth daily  Patient not taking: Reported on 5/31/2023 5/31/23  Historical Provider, MD   cyclobenzaprine (FLEXERIL) 10 mg tablet Take 1 tablet (10 mg total) by mouth as needed in the morning and 1 tablet (10 mg total) as needed in the evening for muscle spasms    Patient not taking: Reported on 5/31/2023 5/23/22 5/31/23  Suzanna Altamirano MD   gabapentin (NEURONTIN) 400 mg capsule Take 400 mg by mouth in the morning and 400 mg in the evening and 400 mg before bedtime  Patient not taking: Reported on 5/23/2022 4/7/21 5/31/23  Historical Provider, MD   hydrOXYzine HCL (ATARAX) 10 mg tablet Take 10 mg by mouth every 6 (six) hours as needed for itching  Patient not taking: Reported on 5/23/2022 5/31/23  Historical Provider, MD   naproxen (NAPROSYN) 250 mg tablet Take 1 tablet (250 mg total) by mouth 2 (two) times a day with meals  Patient not taking: Reported on 2/15/2020 10/1/19 5/31/23  Leticia Mustafa MD   QUEtiapine (SEROquel) 25 mg tablet Take 25 mg by mouth daily at bedtime as needed (sleep)  Patient not taking: Reported on 5/31/2023 5/31/23  Historical Provider, MD   sertraline (ZOLOFT) 100 mg tablet Take 50 mg by mouth daily  Patient not taking: Reported on 5/16/2022 5/31/23  Historical Provider, MD   traZODone (DESYREL) 100 mg tablet Take 200 mg by mouth daily at bedtime as needed for sleep  Patient not taking: Reported on 5/16/2022 5/31/23  Historical Provider, MD     I have reviewed home medications with patient personally      Allergies: No Known Allergies    Social History:     Marital Status:    Occupation: Unclear  Patient Pre-hospital Living Situation: Lives alone  Patient Pre-hospital Level of Mobility: Independent  Patient Pre-hospital Diet Restrictions: Regular  Substance Use History:   Social History     Substance and Sexual Activity   Alcohol Use Not Currently    Comment: none 7/2018-went to rehab     Social History     Tobacco Use   Smoking Status Some Days   • Packs/day: 1 00   • Types: Cigarettes   Smokeless Tobacco Never     Social History     Substance and Sexual Activity   Drug Use Not Currently       Family History:    non-contributory    Physical Exam:     Vitals:   Blood Pressure: 128/76 (05/31/23 2322)  Pulse: 69 (05/31/23 2322)  Temperature: (!) 96 8 °F (36 °C) (05/31/23 2110)  Temp Source: Tympanic (05/31/23 1506)  Respirations: 16 (05/31/23 2322)  SpO2: (!) 89 % (05/31/23 2322)    Physical Exam  Vitals and nursing note reviewed  Constitutional:       Appearance: She is well-developed  HENT:      Head: Normocephalic and atraumatic  Neck:      Thyroid: No thyromegaly  Vascular: No JVD  Trachea: No tracheal deviation  Cardiovascular:      Rate and Rhythm: Normal rate and regular rhythm  Heart sounds: Normal heart sounds  Pulmonary:      Effort: Pulmonary effort is normal  No respiratory distress  Breath sounds: No wheezing or rales  Comments: Breath sounds clear diminished bilateral, on room air, respiration easy  Abdominal:      General: Bowel sounds are normal  There is no distension  Palpations: Abdomen is soft  Tenderness: There is no abdominal tenderness  There is no guarding  Musculoskeletal:         General: No swelling or deformity  Cervical back: Neck supple  Right lower leg: No edema  Left lower leg: No edema  Skin:     General: Skin is warm and dry  Neurological:      General: No focal deficit present  Mental Status: She is alert and oriented to person, place, and time  Psychiatric:         Mood and Affect: Mood normal          Judgment: Judgment normal          Additional Data:     Lab Results: I have personally reviewed pertinent reports  Results from last 7 days   Lab Units 05/31/23  1537   EOS PCT % 5   HEMATOCRIT % 32 3*   HEMOGLOBIN g/dL 10 0*   LYMPHS PCT % 25   MONOS PCT % 6   NEUTROS PCT % 62   PLATELETS Thousands/uL 343   WBC Thousand/uL 7 87     Results from last 7 days   Lab Units 05/31/23  1537   ALK PHOS U/L 34   ALT U/L 17   AST U/L 40*   BUN mg/dL 14   CALCIUM mg/dL 9 3   CHLORIDE mmol/L 92*   CO2 mmol/L 30   CREATININE mg/dL 0 96   POTASSIUM mmol/L 3 4*     Results from last 7 days   Lab Units 05/31/23  1537   INR  0 92       Imaging: I have personally reviewed pertinent reports  CT head without contrast    Result Date: 5/31/2023  Narrative: CT BRAIN - WITHOUT CONTRAST INDICATION:   Headache  COMPARISON:  None  TECHNIQUE:  CT examination of the brain was performed  Multiplanar 2D reformatted images were created from the source data  Radiation dose length product (DLP) for this visit:  962 23 mGy-cm   This examination, like all CT scans performed in the [facility], was performed utilizing techniques to minimize radiation dose exposure, including the use of iterative  reconstruction and automated exposure control  IMAGE QUALITY:  Diagnostic  FINDINGS: PARENCHYMA: No acute intracranial hemorrhage or mass effect  VENTRICLES AND EXTRA-AXIAL SPACES: No hydrocephalus or extra-axial collection VISUALIZED ORBITS: Intact globes and orbits PARANASAL SINUSES: Clear CALVARIUM AND EXTRACRANIAL SOFT TISSUES: No lytic or blastic lesion  Impression: No acute intracranial abnormality  Workstation performed: [id number]       EKG, Pathology, and Other Studies Reviewed on Admission:   · EKG: SVT    Allscripts Records Reviewed: Yes     ** Please Note: Dragon 360 Dictation voice to text software may have been used in the creation of this document   **

## 2023-06-01 NOTE — ASSESSMENT & PLAN NOTE
· Hemoglobin 10 0   · No prior labs to compare  · Check iron panel, B12, folate, TSH, free T4  · Monitor CBC

## 2023-06-01 NOTE — CONSULTS
Consultation - Cardiology   [facility]     Alan Martinez 63 y o  female MRN: [id number]  : 1960  Unit/Bed#: [address] [place] [address] Encounter: [id number]      Assessment & Plan   1  Supraventricular tachycardia  - Presented on 23 for evaluation for headache and bilateral upper extremity numbness  Patient reports she was feeling her baseline health but after lunch yesterday, 23, she had a sudden onset of headache, facial flushing, and bilateral upper extremity numbness and weakness  - Upon arrival to ED, patient hypotensive 80/51 and tachycardic with heart rate 180s    - 23 EKG noted SVT rate 177 bpm; ST abnormality, possible inferior subendocardial injury  - Patient given adenosine 6 mg IV once and converted to sinus rhythm  - 23 EKG after adenosine: sinus rhythm, rate 85 bpm;  PACs  - Continue Lopressor 12 5 mg twice daily    - Monitor electrolytes  Replete potassium above 4 and magnesium above 2     - Follow up 23 TTE    - Will plan for nuclear stress test tomorrow, 23  2  Elevated troponin  - 23 hs troponin: 5 (0hrs), 109 (2hrs), 196 (4hrs)  - 23 hs troponin random: 268   - 23 hs troponin random: 183  - Likely non ischemic myocardial injury secondary to SVT and hypotension on presentation    - Patient denies chest pain  - Follow up 23 TTE    - Will plan for nuclear stress test tomorrow, 23  3  Hypothyroidism      - Patient reports she was told she had an abnormal thyroid test in the past but admits she did not follow up  - 23 TSH: 143 129    - 23 Free T4: <0 25   - Recommend start levothyroxine  4  Hyperlipidemia      - 23 lipid panel: cholesterol 256, triglycerides 88, HDL 71,   - Will start Lipitor 40 mg daily  5  COPD      - Does not appear in acute exacerbation    - Not on supplemental oxygen     - Care per primary team      6  Depression/anxiety      - Documented home medication Seroquel 25 mg daily at bedtime    - Care per primary team      7  ETOH abuse  - Patient reports she has been sober for the past 5 years  Summary of Recommendations: Thank you for your consultation  Physician Requesting Consult: Luis Antonio Orellana DO    Reason for Consult / Principal Problem: SVT, elevated troponin  Inpatient consult to Cardiology  Consult performed by: Jordy Waggoner PA-C  Consult ordered by: BERNABE Alanis        HPI: Tiffany Soto is a 63y o  year old female, current smoker, with PMHx of COPD (not on supplemental O2), ETOH abuse (sober 5 years), and depression/anxiety who presented on 5/31/23 for evaluation for headache and bilateral upper extremity numbness  Patient reports she was feeling her baseline health but after lunch yesterday, 5/31/23, she had a sudden onset of headache, facial flushing, and bilateral upper extremity numbness and weakness  Upon arrival to ED, patient hypotensive 80/51 and tachycardic with heart rate 180s  5/31/23 EKG noted SVT rate 177 bpm; ST abnormality, possible inferior subendocardial injury  Patient given adenosine 6 mg IV once and converted to sinus rhythm  Repeat 5/31/23 EKG: sinus rhythm, rate 85 bpm;  PACs  Review of telemetry notes patient has remained in sinus rhythm since admission  Patient denies experiencing chest pain, palpitations, lightheadedness, nausea or vomiting  Review of Systems   Constitutional: Positive for unexpected weight change  Negative for activity change, fatigue and fever  Respiratory: Negative for cough, chest tightness, shortness of breath and wheezing  Cardiovascular: Negative for chest pain, palpitations and leg swelling  Gastrointestinal: Negative for abdominal distention, abdominal pain, diarrhea, nausea and vomiting  Neurological: Positive for dizziness, weakness, numbness and headaches  Negative for syncope, facial asymmetry and speech difficulty         Historical Information   Past Medical History:   Diagnosis Date   • Anxiety    • Depression    • Disease of thyroid gland    • Substance abuse ([facility] [address] )      Past Surgical History:   Procedure Laterality Date   • BACK SURGERY  01/01/1973    scolosis rods   • ELBOW SURGERY  05/01/2018   • WISDOM TOOTH EXTRACTION       Social History     Substance and Sexual Activity   Alcohol Use Not Currently    Comment: none 7/2018-went to rehab     Social History     Substance and Sexual Activity   Drug Use Not Currently     Social History     Tobacco Use   Smoking Status Some Days   • Packs/day: 1 00   • Types: Cigarettes   Smokeless Tobacco Never     Family History:   Family History   Problem Relation Age of Onset   • Colon cancer Mother    • Breast cancer Mother    • Cancer Mother    • Leukemia Father        Meds/Allergies    PTA meds:    Medications Prior to Admission   Medication   • albuterol (Ventolin HFA) 90 mcg/act inhaler   • fluticasone (FLONASE) 50 mcg/act nasal spray   • gabapentin (NEURONTIN) 300 mg capsule   • hydrOXYzine HCL (ATARAX) 25 mg tablet   • loratadine (CLARITIN) 10 mg tablet   • QUEtiapine (SEROquel) 25 mg tablet   • mometasone-formoterol (DULERA) 100-5 MCG/ACT inhaler      No Known Allergies    Current Facility-Administered Medications:   •  acetaminophen (TYLENOL) tablet 650 mg, 650 mg, Oral, Q6H PRN, BERNABE Mauro  •  fluticasone (FLONASE) 50 mcg/act nasal spray 1 spray, 1 spray, Nasal, Daily, BERNABE Mauro, 1 spray at 06/01/23 1017  •  gabapentin (NEURONTIN) capsule 400 mg, 400 mg, Oral, BID, BERNABE Mauro, 400 mg at 06/01/23 1017  •  hydrOXYzine HCL (ATARAX) tablet 25 mg, 25 mg, Oral, TID PRN, BERNABE Mauro  •  iron sucrose (VENOFER) 300 mg in sodium chloride 0 9 % 250 mL IVPB, 300 mg, Intravenous, Once, Barbara Revankar, DO, 300 mg at 06/01/23 1207  •  loratadine (CLARITIN) tablet 10 mg, 10 mg, Oral, Daily, BERNABE Mauro, 10 mg at 06/01/23 1018  •  metoprolol tartrate (LOPRESSOR) partial tablet 12 5 mg, 12 5 mg, Oral, Q12H [address], KATHRYN MauroNP, 12 5 mg at 05/31/23 2248  •  nicotine (NICODERM CQ) 21 mg/24 hr TD 24 hr patch 1 patch, 1 patch, Transdermal, Daily, BERNABE Mauro, 1 patch at 06/01/23 1019  •  QUEtiapine (SEROquel) tablet 25 mg, 25 mg, Oral, HS PRN, BERNABE Mauro    VTE Pharmacologic Prophylaxis: none  Objective:   Vitals: Blood pressure 104/65, pulse 74, temperature 97 8 °F (36 6 °C), temperature source Tympanic, resp  rate 20, height 5' (1 524 m), weight 51 3 kg (113 lb), SpO2 93 %, not currently breastfeeding  Body mass index is 22 07 kg/m²  Wt Readings from Last 3 Encounters:   06/01/23 51 3 kg (113 lb)   01/16/23 51 5 kg (113 lb 8 oz)   08/15/22 49 9 kg (110 lb)     BP Readings from Last 3 Encounters:   06/01/23 104/65   01/16/23 122/57   05/23/22 110/70     Orthostatic Blood Pressures    Flowsheet Row Most Recent Value   Blood Pressure 104/65 filed at 06/01/2023 1242   Patient Position - Orthostatic VS Sitting filed at 06/01/2023 1242          Intake/Output Summary (Last 24 hours) at 6/1/2023 1253  Last data filed at 5/31/2023 1836  Gross per 24 hour   Intake 1000 ml   Output --   Net 1000 ml       Invasive Devices     Peripheral Intravenous Line  Duration           Peripheral IV 05/31/23 Left Antecubital <1 day              Physical Exam:   Physical Exam  Vitals reviewed  Constitutional:       General: She is not in acute distress  Cardiovascular:      Rate and Rhythm: Normal rate and regular rhythm  Pulses: Normal pulses  Heart sounds: Murmur heard  Pulmonary:      Effort: Pulmonary effort is normal  No respiratory distress  Breath sounds: Decreased breath sounds present  Abdominal:      General: Abdomen is flat  There is no distension  Palpations: Abdomen is soft  Musculoskeletal:      Right lower leg: No edema  Left lower leg: No edema  Skin:     General: Skin is warm and dry     Neurological:      Mental Status: She is alert "and oriented to person, place, and time         Labs:   Troponins:  Results from last 7 days   Lab Units 06/01/23  0925 05/31/23  2305 05/31/23  1946 05/31/23  1735   HS TNI RAND ng/L 183* 268*  --   --    HSTNI D2 ng/L  --   --   --  104*   HSTNI D4 ng/L  --   --  191*  --        CBC with diff:   Results from last 7 days   Lab Units 06/01/23  0503 05/31/23  1537   HEMATOCRIT % 26 3* 32 3*   HEMOGLOBIN g/dL 7 8* 10 0*   MCH pg 24 7* 24 8*   MCHC g/dL 29 7* 31 0*   MCV fL 83 80*   MPV fL 9 0 9 0   NRBC AUTO /100 WBCs  --  0   PLATELETS Thousands/uL 258 343   RBC Million/uL 3 16* 4 03   RDW % 17 8* 17 3*   WBC Thousand/uL 6 19 7 87       CMP:   Results from last 7 days   Lab Units 06/01/23  0503 05/31/23  2305 05/31/23  1537   ANION GAP mmol/L 3* 3* 6   ALBUMIN g/dL 3 5  --  4 6   ALK PHOS U/L 25*  --  34   ALT U/L 12  --  17   AST U/L 30  --  40*   BUN mg/dL 11 11 14   CALCIUM mg/dL 7 9* 8 1* 9 3   CHLORIDE mmol/L 105 102 92*   CO2 mmol/L 28 28 30   CREATININE mg/dL 0 83 0 78 0 96   EGFR ml/min/1 73sq m 76 82 64   GLUCOSE RANDOM mg/dL 79 84 109   POTASSIUM mmol/L 5 0 4 1 3 4*   SODIUM mmol/L 136 133* 128*   TOTAL BILIRUBIN mg/dL 0 29  --  0 32   TOTAL PROTEIN g/dL 5 8*  --  7 6       Magnesium:   Results from last 7 days   Lab Units 06/01/23  0503 05/31/23  1537   MAGNESIUM mg/dL 2 1 2 2     Coags:   Results from last 7 days   Lab Units 05/31/23  1537   INR  0 92   PTT seconds 32     TSH:    Results from last 7 days   Lab Units 06/01/23  0503   TSH 3RD GENERATON uIU/mL 143 129*     No components found for: \"TSH3\"  Lipid Profile:   Results from last 7 days   Lab Units 06/01/23  0503   CHOLESTEROL mg/dL 256*   HDL mg/dL 71   LDL CALC mg/dL 167*   TRIGLYCERIDES mg/dL 88     Lipid Profile:   Lab Results   Component Value Date    CHOLESTEROL 256 (H) 06/01/2023    HDL 71 06/01/2023    LDLCALC 167 (H) 06/01/2023    TRIG 88 06/01/2023     Hgb A1c:   Results from last 7 days   Lab Units 05/31/23  1537   HEMOGLOBIN A1C % 5 3 " "    NT-proBNP: No results for input(s): \"NTBNP\" in the last 72 hours  Imaging & Testing     Cardiac testing:   No results found for this or any previous visit  Imaging: I have personally reviewed pertinent reports  XR chest 1 view portable    Result Date: 6/1/2023    Impression: No acute cardiopulmonary disease  Workstation performed: [id number]     CT head without contrast    Result Date: 5/31/2023    Impression: No acute intracranial abnormality  Workstation performed: [id number]       EKG/ Monitor: Personally reviewed  Telemetry reviewed: currently sinus rhythm, rate 67 bpm  No events noted        Code Status: Level 1 - Full Code  Advance Directive and Living Will:      POLST:        Sanchez Wynn PA-C   "

## 2023-06-01 NOTE — ASSESSMENT & PLAN NOTE
Reports both arms were tingling when in SVT  · CT head no acute findings  · Takes gabapentin at home for back pain, being continued

## 2023-06-01 NOTE — UTILIZATION REVIEW
Initial Clinical Review    Admission: Date/Time/Statement:   Admission Orders (From admission, onward)     Ordered        05/31/23 1938  INPATIENT ADMISSION  Once                      Orders Placed This Encounter   Procedures   • INPATIENT ADMISSION     Standing Status:   Standing     Number of Occurrences:   1     Order Specific Question:   Level of Care     Answer:   Med Surg [16]     Order Specific Question:   Estimated length of stay     Answer:   More than 2 Midnights     Order Specific Question:   Certification     Answer:   I certify that inpatient services are medically necessary for this patient for a duration of greater than two midnights  See H&P and MD Progress Notes for additional information about the patient's course of treatment  ED Arrival Information     Expected   -    Arrival   5/31/2023 14:51    Acuity   Emergent            Means of arrival   Wheelchair    Escorted by   Self    Service   Hospitalist    Admission type   Emergency            Arrival complaint   headache            Chief Complaint   Patient presents with   • Headache     Pt reports 9/10 headache in her forehead that came on suddenty around lunch       Initial Presentation:   63 yof to ER from home c/o sudden onset frontal headache with associated dizziness, bilateral arm tingling, weakness  Hx alcohol abuse(sober 5 yrs), depression with anxiety, nicotine abuse, thyroid disease, chronic low back pain  Presents tachycardic, hypotensive  Admission work-up showing SVT converted to sinus rhythm with IV adenosine, anemia, hyponatremia, hypokalemia, elevated troponin  Admitted to inpatient status for SVT  Date: 6/1/23   Day 2:   SVT mgt per cardio  Rate better controlled today  Lungs with decreased breath sounds  Per cardio: SVT, elevated troponin  TTE today, plan nuclear stress test tomorrow  Continue Lopressor, start Lipitor, monitor lytes        ED Triage Vitals [05/31/23 1506]   Temperature Pulse Respirations Blood Pressure SpO2   97 6 °F (36 4 °C) (!) 180 18 (!) 80/51 96 %      Temp Source Heart Rate Source Patient Position - Orthostatic VS BP Location FiO2 (%)   Tympanic Monitor Sitting Right arm --      Pain Score       9          Wt Readings from Last 1 Encounters:   06/02/23 51 3 kg (113 lb)     Additional Vital Signs:   Date/Time Temp Pulse Resp BP MAP (mmHg) SpO2 O2 Device Patient Position - Orthostatic VS   06/01/23 0744 98 3 °F (36 8 °C) 62 19 108/72 84 92 % None (Room air) Sitting   06/01/23 07:43:49 98 3 °F (36 8 °C) 62 -- 108/72 84 92 % -- --   06/01/23 03:15:28 96 5 °F (35 8 °C) Abnormal  62 18 92/60 71 97 % -- --   05/31/23 23:22:14 -- 69 16 128/76 93 89 % Abnormal  -- --   05/31/23 21:10:40 96 8 °F (36 °C) Abnormal  67 18 130/83 99 92 % -- --   05/31/23 2030 -- 68 18 101/62 77 94 % None (Room air) Lying   05/31/23 1945 -- 70 18 111/61 81 96 % None (Room air) Lying   05/31/23 1915 -- 68 18 105/62 79 94 % None (Room air) Lying   05/31/23 1845 -- 74 18 113/64 82 96 % None (Room air) Lying   05/31/23 1830 -- 70 26 Abnormal  102/63 79 95 % None (Room air) Lying   05/31/23 1730 -- 74 19 99/63 76 95 % None (Room air) Lying   05/31/23 1645 -- 80 16 102/64 79 96 % None (Room air) Lying   05/31/23 1555 -- -- -- -- -- -- None (Room air) --   05/31/23 1545 -- 80 16 101/65 78 95 % None (Room air) Lying   05/31/23 1506 97 6 °F (36 4 °C) 180 Abnormal  18 80/51 Abnormal  -- 96 % None (Room air) Sitting     Pertinent Labs/Diagnostic Test Results:  6/2 Nuclear stress test  Result pending    6/1 Echo=   Results pending  CT head without contrast   Final Result  (05/31 1813)      No acute intracranial abnormality  XR chest 1 view portable   Final Resul (06/01 1043)      No acute cardiopulmonary disease       5/31 Ekg=  Interpretation: abnormal     Rate:     ECG rate:  177     ECG rate assessment: tachycardic     Rhythm:     Rhythm: SVT     ST segments:     ST segments:  Non-specific    Results from last 7 days   Lab Units 06/02/23  0572 06/01/23  0503 05/31/23  1537   HEMATOCRIT % 27 3* 26 3* 32 3*   HEMOGLOBIN g/dL 8 1* 7 8* 10 0*   NEUTROS ABS Thousands/µL  --   --  4 87   PLATELETS Thousands/uL 249 258 343   WBC Thousand/uL 5 81 6 19 7 87     Results from last 7 days   Lab Units 06/02/23  0519 06/01/23  0503 05/31/23  2305 05/31/23  1537   ANION GAP mmol/L 5 3* 3* 6   BUN mg/dL 16 11 11 14   CALCIUM mg/dL 8 4 7 9* 8 1* 9 3   CHLORIDE mmol/L 100 105 102 92*   CO2 mmol/L 27 28 28 30   CREATININE mg/dL 0 76 0 83 0 78 0 96   EGFR ml/min/1 73sq m 85 76 82 64   POTASSIUM mmol/L 4 1 5 0 4 1 3 4*   MAGNESIUM mg/dL  --  2 1  --  2 2   SODIUM mmol/L 132* 136 133* 128*     Results from last 7 days   Lab Units 06/01/23  0503 05/31/23  1537   ALBUMIN g/dL 3 5 4 6   ALK PHOS U/L 25* 34   ALT U/L 12 17   AST U/L 30 40*   TOTAL BILIRUBIN mg/dL 0 29 0 32   TOTAL PROTEIN g/dL 5 8* 7 6     Results from last 7 days   Lab Units 06/02/23  0519 06/01/23  0503 05/31/23  2305 05/31/23  1537   GLUCOSE RANDOM mg/dL 84 79 84 109     Results from last 7 days   Lab Units 05/31/23  1946 05/31/23  1735 05/31/23  1537   HS TNI 0HR ng/L  --   --  5   HS TNI 2HR ng/L  --  109*  --    HS TNI 4HR ng/L 196*  --   --    HSTNI D2 ng/L  --  104*  --    HSTNI D4 ng/L 191*  --   --      Results from last 7 days   Lab Units 05/31/23  1537   INR  0 92   PROTIME seconds 12 5   PTT seconds 32     Results from last 7 days   Lab Units 06/01/23  0503   TSH 3RD GENERATON uIU/mL 143 129*     Results from last 7 days   Lab Units 05/31/23  1537   LACTIC ACID mmol/L 1 0     Results from last 7 days   Lab Units 05/31/23  2151   BILIRUBIN UA  Negative   BLOOD UA  Negative   CLARITY UA  Clear   COLOR UA  Light Yellow   GLUCOSE UA mg/dl Negative   KETONES UA mg/dl Negative   LEUKOCYTES UA  Negative   SODIUM UR  34   NITRITE UA  Negative   PH UA  6 0   PROTEIN UA mg/dl Negative   SPEC GRAV UA  1 010   UROBILINOGEN UA E U /dl 0 2     Results from last 7 days   Lab Units 05/31/23  2151   AMPH/METH Negative   BARBITURATE UR  Negative   BENZODIAZEPINE UR  Negative   COCAINE UR  Negative   METHADONE URINE  Negative   OPIATE UR  Negative   PCP UR  Negative   THC UR  Negative     Results from last 7 days   Lab Units 05/31/23  1540   BLOOD CULTURE  No Growth at 24 hrs  ED Treatment:   Medication Administration from 05/31/2023 1450 to 05/31/2023 2106       Date/Time Order Dose Route Action     05/31/2023 1541 EDT sodium chloride 0 9 % bolus 1,000 mL 1,000 mL Intravenous New Bag     05/31/2023 1541 EDT adenosine (ADENOCARD) injection 6 mg 6 mg Intravenous Given     05/31/2023 1656 EDT potassium chloride (K-DUR,KLOR-CON) CR tablet 20 mEq 20 mEq Oral Given     05/31/2023 1736 EDT sodium chloride 0 9 % bolus 1,000 mL 1,000 mL Intravenous New Bag     05/31/2023 1839 EDT sodium chloride 0 9 % infusion 150 mL/hr Intravenous New Bag     05/31/2023 1857 EDT aspirin chewable tablet 324 mg 324 mg Oral Given        Past Medical History:   Diagnosis Date   • Anxiety    • Depression    • Disease of thyroid gland    • Substance abuse ([facility] [address] )      Admitting Diagnosis: SVT (supraventricular tachycardia) (HCC) [I47 1]  Hypotension [I95 9]  Elevated troponin [R77 8]  Headache [R51 9]  Age/Sex: 63 y o  female  Admission Orders:  Cont pulse ox  Telemetry  Scd  Consult cardio  Consult renal    Scheduled Medications:  enoxaparin, 40 mg, Subcutaneous, Daily  fluticasone, 1 spray, Nasal, Daily  gabapentin, 400 mg, Oral, BID  loratadine, 10 mg, Oral, Daily  metoprolol tartrate, 12 5 mg, Oral, Q12H [address]  nicotine, 1 patch, Transdermal, Daily  pneumococcal 20-ismael conj vacc, 0 5 mL, Intramuscular, Once    PRN Meds:  acetaminophen, 650 mg, Oral, Q6H PRN  hydrOXYzine HCL, 25 mg, Oral, TID PRN  QUEtiapine, 25 mg, Oral, HS PRN    Network Utilization Review Department  ATTENTION: Please call with any questions or concerns to [phone] and carefully listen to the prompts so that you are directed to the right person   All voicemails are confidential   Cailin Liriano all requests for admission clinical reviews, approved or denied determinations and any other requests to dedicated fax number below belonging to the campus where the patient is receiving treatment   List of dedicated fax numbers for the Facilities:  [address] DENIALS (Administrative/Medical Necessity) [phone]   [address] (Maternity/NICU/Pediatrics) [phone]   914 Radha Chino [phone]   [facility] [address] [phone]   [address] [phone]   1557 [facility] 134 815 [facility] [phone]

## 2023-06-01 NOTE — ASSESSMENT & PLAN NOTE
· Sodium 128  · No prior labs to compare  · Received NS 2 L in ED  · Check urine sodium, urine osmole, serum osmole, uric acid, TSH, free T4  · Chest x-ray as above, pending read     · Check BMP stat and in AM  · Consult nephrology

## 2023-06-01 NOTE — CASE MANAGEMENT
Case Management Assessment & Discharge Planning Note    Patient name Tiffany Soto  Location [address] 86-* MRN [id number]  : 1960 Date 2023       Current Admission Date: 2023  Current Admission Diagnosis:SVT (supraventricular tachycardia) [facility])   Patient Active Problem List    Diagnosis Date Noted   • SVT (supraventricular tachycardia) ([facility] [address] ) 2023   • Depression with anxiety 2023   • Nicotine abuse 2023   • Elevated troponin 2023   • Hyponatremia 2023   • Hypokalemia 2023   • Anemia 2023   • Tingling of both upper extremities 2023   • History of alcohol abuse 2023   • Chronic low back pain 2023   • Acute bronchitis 2019   • Chronic obstructive pulmonary disease ([facility] [address] ) 2019      LOS (days): 1  Geometric Mean LOS (GMLOS) (days): 2 40  Days to GMLOS:1 7     OBJECTIVE:    Risk of Unplanned Readmission Score: 14 94       Current admission status: Inpatient  Referral Reason: Other (Discharge planning)    Preferred Pharmacy:   [address] [place], [place] [id number]  Phone: [phone] Fax: [phone]    Primary Care Provider: Britney Villanueva DO    Primary Insurance: [facility]  Secondary Insurance:     ASSESSMENT:  [facility] Proxies    There are no active Health Care Proxies on file  Readmission Root Cause  30 Day Readmission: No    Patient Information  Admitted from[de-identified] Home  Mental Status: Alert  During Assessment patient was accompanied by: Not accompanied during assessment  Assessment information provided by[de-identified] Patient  Primary Caregiver: Self  Support Systems: [place] of Residence: [address] [place] [address] do you live in?: [address] [place] [address] Road entry access options   Select all that apply : Stairs  Number of steps to enter home : One Flight  Type of Current Residence: Apartment (Rents room in a private home)  Floor Level: 2  Upon entering residence, is there a bedroom on the main floor (no further steps)?: Yes  Upon entering residence, is there a bathroom on the main floor (no further steps)?: Yes  In the last 12 months, was there a time when you were not able to pay the mortgage or rent on time?: No  In the last 12 months, was there a time when you did not have a steady place to sleep or slept in a shelter (including now)?: No  Homeless/housing insecurity resource given?: N/A  Living Arrangements: Lives Alone  Is patient a ?: No    Activities of Daily Living Prior to Admission  Functional Status: Independent  Completes ADLs independently?: Yes  Ambulates independently?: Yes  Does patient use assisted devices?: No  Does patient currently own DME?: No  Does patient have a history of Outpatient Therapy (PT/OT)?: No  Does the patient have a history of Short-Term Rehab?: Yes ([address] [facility])  Does patient have a history of HHC?: No  Does patient currently have [facility]?: No    Patient Information Continued  Income Source: Employed ([address] [employer], [address] [employer])  Does patient have prescription coverage?: Yes  Within the past 12 months, you worried that your food would run out before you got the money to buy more : Never true  Within the past 12 months, the food you bought just didn't last and you didn't have money to get more : Never true  Food insecurity resource given?: N/A  Does patient receive dialysis treatments?: No  Does patient have a history of substance abuse?: Yes  Historical substance use preference: Alcohol/ETOH  Is patient currently in treatment for substance abuse?: N/A - sober    Means of [place] [employer] Insurance of Transport to Appts[de-identified] Friends (Friends, taxi, [place] , Lyft)  In the past 12 months, has lack of transportation kept you from medical appointments or from getting medications?: No  In the past 12 months, has lack of transportation kept you from meetings, work, or from getting things needed for daily living?: No  Was application for public transport provided?: N/A      DISCHARGE DETAILS:    Discharge planning discussed with[de-identified] Patient  Freedom of Choice: Yes     Comments - Freedom of Choice: SW spoke with patient at bedside to introduce role of CM, conduct assessment and discuss discharge plans  Patient lives alone in a rented room in a private home and works part time in [address]  She uses [employer] to get to work and relies on friends, taxis or Lyft for other transportation  Patient's emergency contact was listed as an [employer]  but patient stated that she is no longer being served by [employer]  She has a son who is estranged and refuses contact with her and she does not have his contact information  Patient provided contact information for her employer but requested that she not be contacted with any medical updates, only in case of an emergency  SW placed sticky note to treatment team on chart to reflect this request       Patient is independent at baseline and plan is for return home when medically cleared  Patient will need a Lyft home at discharge  SW will continue to follow       CM contacted family/caregiver?: No- see comments (See above)  Were Treatment Team discharge recommendations reviewed with patient/caregiver?: Yes  Did patient/caregiver verbalize understanding of patient care needs?: Yes  Were patient/caregiver advised of the risks associated with not following Treatment Team discharge recommendations?: Yes    [facility]         Is the patient interested in [facility] at discharge?: No    DME Referral Provided  Referral made for DME?: No    Other Referral/Resources/Interventions Provided:  Interventions: Transportation  Referral Comments: Patient will need a Lyft at discharge    Would you like to participate in our [address] service program?  : No - Declined    Treatment Team Recommendation: Home  Discharge Destination Plan[de-identified] Home  Transport at Discharge : Ride Share         IMM Given (Date):: 05/31/23

## 2023-06-01 NOTE — ASSESSMENT & PLAN NOTE
TSH elevated with low free T4  · Discussed with patient and will start levothyroxine 1 6 mcg/kg/day discussed with patient that she will need repeat follow-up  · Discussed with patient that she will need repeat lab work after discharge with PCP for adjustment of dosage

## 2023-06-01 NOTE — PLAN OF CARE
Problem: PAIN - ADULT  Goal: Verbalizes/displays adequate comfort level or baseline comfort level  Description: Interventions:  - Encourage patient to monitor pain and request assistance  - Assess pain using appropriate pain scale  - Administer analgesics based on type and severity of pain and evaluate response  - Implement non-pharmacological measures as appropriate and evaluate response  - Consider cultural and social influences on pain and pain management  - Notify physician/advanced practitioner if interventions unsuccessful or patient reports new pain  Outcome: Progressing     Problem: DISCHARGE PLANNING  Goal: Discharge to home or other facility with appropriate resources  Description: INTERVENTIONS:  - Identify barriers to discharge w/patient and caregiver  - Arrange for needed discharge resources and transportation as appropriate  - Identify discharge learning needs (meds, wound care, etc )  - Arrange for interpretive services to assist at discharge as needed  - Refer to Case Management Department for coordinating discharge planning if the patient needs post-hospital services based on physician/advanced practitioner order or complex needs related to functional status, cognitive ability, or social support system  Outcome: Progressing     Problem: Knowledge Deficit  Goal: Patient/family/caregiver demonstrates understanding of disease process, treatment plan, medications, and discharge instructions  Description: Complete learning assessment and assess knowledge base    Interventions:  - Provide teaching at level of understanding  - Provide teaching via preferred learning methods  Outcome: Progressing     Problem: CARDIOVASCULAR - ADULT  Goal: Absence of cardiac dysrhythmias or at baseline rhythm  Description: INTERVENTIONS:  - Continuous cardiac monitoring, vital signs, obtain 12 lead EKG if ordered  - Administer antiarrhythmic and heart rate control medications as ordered  - Monitor electrolytes and administer replacement therapy as ordered  Outcome: Progressing

## 2023-06-01 NOTE — ASSESSMENT & PLAN NOTE
· Sodium 128 --> 136 today  · Received NS 2 L in ED  · urine sodium 34, urine osm 260, serum osm 282, uric acid 3 1  · Resolved on repeat lab work today

## 2023-06-01 NOTE — ASSESSMENT & PLAN NOTE
· Hemoglobin 10 0, initially unsure if this is accurate  · iron panel reviewed  Started on IV Venofer here  no obvious signs of bleeding and patient denies any melena, hematochezia, hematemesis, hemoptysis, hematuria   · B12 low at 166  will need replacement on discharge  folate 13  · Last colonoscopy was greater than 5 years but less than 10 years ago    Per patient was normal and advised to follow-up for repeat colonoscopy in 10 years  · Monitor CBC

## 2023-06-01 NOTE — PROGRESS NOTES
[facility] [address]  Progress Note  Name: Josué Baig  MRN: [id number]  Unit/Bed#: [address] [address] I Date of Admission: 5/31/2023   Date of Service: 6/1/2023 I Hospital Day: 1    Assessment/Plan   * SVT (supraventricular tachycardia) [facility])  Assessment & Plan  Patient presents with sudden onset of headache, dizziness, bilateral arm tingling, weakness after eating lunch  Patient arrived to ED hypotensive in   Patient reports remote history of palpitation  · Patient converted to sinus rhythm with IV adenosine in ED  · BP improved after IV fluid bolus  · Initial EKG showed SVT, rate 177  · Continue low-dose metoprolol  · Potassium level within normal limits status post repletion  · Chest x-ray negative for pneumonia  · Telemetry  · Appreciate cardiology input    Elevated troponin  Assessment & Plan  Initial troponin 5, peaked at 268  · Most likely non-MI troponin elevation due to to SVT  · EKG showed SVT, rate 177, marked ST abnormality  · Patient denies chest pain  · lipid panel -  A1c 5 3  · 2D echo results pending  · Received full dose aspirin in ED    Anemia  Assessment & Plan  · Hemoglobin 10 0, initially unsure if this is accurate  · iron panel reviewed  Started on IV Venofer here  no obvious signs of bleeding and patient denies any melena, hematochezia, hematemesis, hemoptysis, hematuria   · B12 low at 166  will need replacement on discharge  folate 13  · Last colonoscopy was greater than 5 years but less than 10 years ago    Per patient was normal and advised to follow-up for repeat colonoscopy in 10 years  · Monitor CBC    Hypothyroid  Assessment & Plan  TSH elevated with low free T4  · Discussed with patient and will start levothyroxine 1 6 mcg/kg/day discussed with patient that she will need repeat follow-up  · Discussed with patient that she will need repeat lab work after discharge with PCP for adjustment of dosage    Hyponatremia  Assessment & Plan  · Sodium 128 --> 136 today  · Received NS 2 L in ED  · urine sodium 34, urine osm 260, serum osm 282, uric acid 3 1  · Resolved on repeat lab work today    Chronic low back pain  Assessment & Plan  · Continue gabapentin  History of alcohol abuse  Assessment & Plan  · Reports sober for 5 years    Tingling of both upper extremities  Assessment & Plan  Reports both arms were tingling when in SVT  · CT head no acute findings  · Takes gabapentin at home for back pain, being continued    Nicotine abuse  Assessment & Plan  · Nicotine patch, smoking cessation    Depression with anxiety  Assessment & Plan  · Continue Atarax and Seroquel as needed  Hypokalemia-resolved as of 2023  Assessment & Plan  · Resolved with repletion         VTE Pharmacologic Prophylaxis:   none due to anemia    Patient Centered Rounds: I performed bedside rounds with nursing staff today  Discussions with Specialists or Other Care Team Provider: yes - cardio    Education and Discussions with Family / Patient: Patient declined call to   Total Time Spent on Date of Encounter in care of patient: 40 min This time was spent on one or more of the following: performing physical exam; counseling and coordination of care; obtaining or reviewing history; documenting in the medical record; reviewing/ordering tests, medications or procedures; communicating with other healthcare professionals and discussing with patient's family/caregivers  Current Length of Stay: 1 day(s)  Current Patient Status: Inpatient   Certification Statement: The patient will continue to require additional inpatient hospital stay due to Elevated troponin requiring stress test, SVT, hypothyroidism  Discharge Plan: Anticipate discharge in 24-48 hrs to home  Code Status: Level 1 - Full Code    Subjective:     Patient denies any chest pain, palpitations, shortness of breath      Objective:     Vitals:   Temp (24hrs), Av 5 °F (36 4 °C), Min:96 5 °F (35 8 °C), Max:98 3 °F (36 8 °C)    Temp:  [96 5 °F (35 8 °C)-98 3 °F (36 8 °C)] 98 3 °F (36 8 °C)  HR:  [] 62  Resp:  [16-26] 19  BP: ()/(51-83) 108/72  SpO2:  [89 %-97 %] 92 %  Body mass index is 22 17 kg/m²  Input and Output Summary (last 24 hours): Intake/Output Summary (Last 24 hours) at 6/1/2023 0955  Last data filed at 5/31/2023 1836  Gross per 24 hour   Intake 1000 ml   Output --   Net 1000 ml       Physical Exam:   Physical Exam  Constitutional:       General: She is not in acute distress  Appearance: She is not toxic-appearing  HENT:      Head: Normocephalic and atraumatic  Eyes:      General: No scleral icterus  Cardiovascular:      Rate and Rhythm: Normal rate and regular rhythm  Pulmonary:      Effort: Pulmonary effort is normal  No respiratory distress  Breath sounds: Normal breath sounds  No wheezing or rales  Abdominal:      General: Bowel sounds are normal  There is no distension  Palpations: Abdomen is soft  Tenderness: There is no abdominal tenderness  Neurological:      Mental Status: She is alert and oriented to person, place, and time            Additional Data:     Labs:  Results from last 7 days   Lab Units 06/01/23  0503 05/31/23  1537   EOS PCT %  --  5   HEMATOCRIT % 26 3* 32 3*   HEMOGLOBIN g/dL 7 8* 10 0*   LYMPHS PCT %  --  25   MONOS PCT %  --  6   NEUTROS PCT %  --  62   PLATELETS Thousands/uL 258 343   WBC Thousand/uL 6 19 7 87     Results from last 7 days   Lab Units 06/01/23  0503   ANION GAP mmol/L 3*   ALBUMIN g/dL 3 5   ALK PHOS U/L 25*   ALT U/L 12   AST U/L 30   BUN mg/dL 11   CALCIUM mg/dL 7 9*   CHLORIDE mmol/L 105   CO2 mmol/L 28   CREATININE mg/dL 0 83   GLUCOSE RANDOM mg/dL 79   POTASSIUM mmol/L 5 0   SODIUM mmol/L 136   TOTAL BILIRUBIN mg/dL 0 29     Results from last 7 days   Lab Units 05/31/23  1537   INR  0 92             Results from last 7 days   Lab Units 05/31/23  1537   LACTIC ACID mmol/L 1 0       Lines/Drains:  Invasive Devices Peripheral Intravenous Line  Duration           Peripheral IV 05/31/23 Left Antecubital <1 day                  Telemetry:  Telemetry Orders (From admission, onward)             24 Hour Telemetry Monitoring  (ED Bridging Orders Panel)  Continuous x 24 Hours (Telem)        Question:  Reason for 24 Hour Telemetry  Answer:  Arrhythmias requiring acute medical intervention / PPM or ICD malfunction                 Telemetry Reviewed: Normal Sinus Rhythm  Indication for Continued Telemetry Use: Arrthymias requiring medical therapy             Imaging: Reviewed radiology reports from this admission including: chest xray and CT head    Recent Cultures (last 7 days):   Results from last 7 days   Lab Units 05/31/23  1540   BLOOD CULTURE  Received in Microbiology Lab  Culture in Progress  Last 24 Hours Medication List:   Current Facility-Administered Medications   Medication Dose Route Frequency Provider Last Rate   • acetaminophen  650 mg Oral Q6H PRN BERNABE Mauro     • fluticasone  1 spray Nasal Daily BERNABE Mauro     • gabapentin  400 mg Oral BID BERNABE Mauro     • hydrOXYzine HCL  25 mg Oral TID PRN BERNABE Mauro     • loratadine  10 mg Oral Daily BERNABE Mauro     • metoprolol tartrate  12 5 mg Oral Q12H [address] BERNABE Mauro     • nicotine  1 patch Transdermal Daily BERNABE Mauro     • pneumococcal 20-ismael conj vacc  0 5 mL Intramuscular Once Enrike Rvai MD     • QUEtiapine  25 mg Oral HS PRN BERNABE Mauro          Today, Patient Was Seen By: Rupert Nair DO    **Please Note: This note may have been constructed using a voice recognition system  **

## 2023-06-01 NOTE — ASSESSMENT & PLAN NOTE
Patient presents with sudden onset of headache, dizziness, bilateral arm tingling, weakness after eating lunch  Patient arrived to ED hypotensive in   Patient reports remote history of palpitation  · Patient converted to sinus rhythm with IV adenosine in ED  · BP improved after IV fluid bolus    · Initial EKG showed SVT, rate 177  · Continue low-dose metoprolol  · Potassium level within normal limits status post repletion  · Chest x-ray negative for pneumonia  · Telemetry  · Appreciate cardiology input

## 2023-06-02 ENCOUNTER — APPOINTMENT (INPATIENT)
Dept: RADIOLOGY | Facility: HOSPITAL | Age: 63
DRG: 309 | End: 2023-06-02
Payer: COMMERCIAL

## 2023-06-02 VITALS
BODY MASS INDEX: 22.19 KG/M2 | DIASTOLIC BLOOD PRESSURE: 77 MMHG | OXYGEN SATURATION: 93 % | TEMPERATURE: 97.5 F | RESPIRATION RATE: 18 BRPM | SYSTOLIC BLOOD PRESSURE: 123 MMHG | WEIGHT: 113 LBS | HEIGHT: 60 IN | HEART RATE: 69 BPM

## 2023-06-02 PROBLEM — R20.2 TINGLING OF BOTH UPPER EXTREMITIES: Status: RESOLVED | Noted: 2023-05-31 | Resolved: 2023-06-02

## 2023-06-02 PROBLEM — E87.1 HYPONATREMIA: Status: RESOLVED | Noted: 2023-05-31 | Resolved: 2023-06-02

## 2023-06-02 LAB
ANION GAP SERPL CALCULATED.3IONS-SCNC: 5 MMOL/L (ref 4–13)
AORTIC ROOT: 3 CM
APICAL FOUR CHAMBER EJECTION FRACTION: 36 %
AV LVOT PEAK GRADIENT: 2 MMHG
AV PEAK GRADIENT: 4 MMHG
BUN SERPL-MCNC: 16 MG/DL (ref 5–25)
CALCIUM SERPL-MCNC: 8.4 MG/DL (ref 8.4–10.2)
CHEST PAIN STATEMENT: NORMAL
CHLORIDE SERPL-SCNC: 100 MMOL/L (ref 96–108)
CO2 SERPL-SCNC: 27 MMOL/L (ref 21–32)
CREAT SERPL-MCNC: 0.76 MG/DL (ref 0.6–1.3)
DOP CALC LVOT AREA: 3.14 CM2
DOP CALC LVOT DIAMETER: 2 CM
E WAVE DECELERATION TIME: 133 MS
ERYTHROCYTE [DISTWIDTH] IN BLOOD BY AUTOMATED COUNT: 17.7 % (ref 11.6–15.1)
FOLATE SERPL-MCNC: 11.2 NG/ML
FRACTIONAL SHORTENING: 32 % (ref 28–44)
GFR SERPL CREATININE-BSD FRML MDRD: 85 ML/MIN/1.73SQ M
GLUCOSE SERPL-MCNC: 84 MG/DL (ref 65–140)
HCT VFR BLD AUTO: 27.3 % (ref 34.8–46.1)
HGB BLD-MCNC: 8.1 G/DL (ref 11.5–15.4)
INTERVENTRICULAR SEPTUM IN DIASTOLE (PARASTERNAL SHORT AXIS VIEW): 0.8 CM
INTERVENTRICULAR SEPTUM: 0.8 CM (ref 0.6–1.1)
LAAS-AP2: 10.2 CM2
LAAS-AP4: 15.8 CM2
LEFT ATRIUM AREA SYSTOLE SINGLE PLANE A4C: 13.7 CM2
LEFT ATRIUM SIZE: 3 CM
LEFT INTERNAL DIMENSION IN SYSTOLE: 2.8 CM (ref 2.1–4)
LEFT VENTRICLE DIASTOLIC VOLUME (MOD BIPLANE): 64 ML
LEFT VENTRICLE SYSTOLIC VOLUME (MOD BIPLANE): 32 ML
LEFT VENTRICULAR INTERNAL DIMENSION IN DIASTOLE: 4.1 CM (ref 3.5–6)
LEFT VENTRICULAR POSTERIOR WALL IN END DIASTOLE: 0.8 CM
LEFT VENTRICULAR STROKE VOLUME: 44 ML
LVSV (TEICH): 44 ML
MAX DIASTOLIC BP: 70 MMHG
MAX HEART RATE: 109 BPM
MAX PREDICTED HEART RATE: 160 BPM
MAX. SYSTOLIC BP: 110 MMHG
MCH RBC QN AUTO: 24.5 PG (ref 26.8–34.3)
MCHC RBC AUTO-ENTMCNC: 29.7 G/DL (ref 31.4–37.4)
MCV RBC AUTO: 83 FL (ref 82–98)
MV E'TISSUE VEL-LAT: 11 CM/S
MV E'TISSUE VEL-SEP: 10 CM/S
MV PEAK A VEL: 0.44 M/S
MV PEAK E VEL: 80 CM/S
MV STENOSIS PRESSURE HALF TIME: 39 MS
MV VALVE AREA P 1/2 METHOD: 5.64 CM2
NUC STRESS EJECTION FRACTION: 77 %
PLATELET # BLD AUTO: 249 THOUSANDS/UL (ref 149–390)
PMV BLD AUTO: 9.1 FL (ref 8.9–12.7)
POTASSIUM SERPL-SCNC: 4.1 MMOL/L (ref 3.5–5.3)
PROTOCOL NAME: NORMAL
PV PEAK GRADIENT: 2 MMHG
RATE PRESSURE PRODUCT: 8648
RBC # BLD AUTO: 3.31 MILLION/UL (ref 3.81–5.12)
REASON FOR TERMINATION: NORMAL
RIGHT ATRIUM AREA SYSTOLE A4C: 13.4 CM2
RIGHT VENTRICLE ID DIMENSION: 2.8 CM
SL CV LEFT ATRIUM LENGTH A2C: 3.5 CM
SL CV LV EF: 55
SL CV PED ECHO LEFT VENTRICLE DIASTOLIC VOLUME (MOD BIPLANE) 2D: 72 ML
SL CV PED ECHO LEFT VENTRICLE SYSTOLIC VOLUME (MOD BIPLANE) 2D: 29 ML
SL CV REST NUCLEAR ISOTOPE DOSE: 10.87 MCI
SL CV STRESS NUCLEAR ISOTOPE DOSE: 32.1 MCI
SL CV STRESS RECOVERY BP: NORMAL MMHG
SL CV STRESS RECOVERY HR: 82 BPM
SL CV STRESS RECOVERY O2 SAT: 91 %
SODIUM SERPL-SCNC: 132 MMOL/L (ref 135–147)
STRESS ANGINA INDEX: 0
STRESS BASELINE BP: NORMAL MMHG
STRESS BASELINE HR: 76 BPM
STRESS O2 SAT REST: 92 %
STRESS PEAK HR: 92 BPM
STRESS POST O2 SAT PEAK: 92 %
STRESS POST PEAK BP: 94 MMHG
STRESS/REST PERFUSION RATIO: 1.1
TARGET HR FORMULA: NORMAL
TEST INDICATION: NORMAL
TIME IN EXERCISE PHASE: NORMAL
TR MAX PG: 31 MMHG
TR PEAK VELOCITY: 2.8 M/S
TRICUSPID ANNULAR PLANE SYSTOLIC EXCURSION: 2.2 CM
TRICUSPID VALVE PEAK REGURGITATION VELOCITY: 2.79 M/S
WBC # BLD AUTO: 5.81 THOUSAND/UL (ref 4.31–10.16)

## 2023-06-02 PROCEDURE — A9502 TC99M TETROFOSMIN: HCPCS

## 2023-06-02 PROCEDURE — 78452 HT MUSCLE IMAGE SPECT MULT: CPT

## 2023-06-02 PROCEDURE — 99232 SBSQ HOSP IP/OBS MODERATE 35: CPT | Performed by: INTERNAL MEDICINE

## 2023-06-02 PROCEDURE — 93018 CV STRESS TEST I&R ONLY: CPT | Performed by: INTERNAL MEDICINE

## 2023-06-02 PROCEDURE — 78452 HT MUSCLE IMAGE SPECT MULT: CPT | Performed by: INTERNAL MEDICINE

## 2023-06-02 PROCEDURE — 93016 CV STRESS TEST SUPVJ ONLY: CPT | Performed by: INTERNAL MEDICINE

## 2023-06-02 PROCEDURE — G1004 CDSM NDSC: HCPCS

## 2023-06-02 PROCEDURE — 99239 HOSP IP/OBS DSCHRG MGMT >30: CPT | Performed by: FAMILY MEDICINE

## 2023-06-02 PROCEDURE — 85027 COMPLETE CBC AUTOMATED: CPT | Performed by: FAMILY MEDICINE

## 2023-06-02 PROCEDURE — 93017 CV STRESS TEST TRACING ONLY: CPT

## 2023-06-02 PROCEDURE — 80048 BASIC METABOLIC PNL TOTAL CA: CPT | Performed by: FAMILY MEDICINE

## 2023-06-02 PROCEDURE — 82746 ASSAY OF FOLIC ACID SERUM: CPT | Performed by: FAMILY MEDICINE

## 2023-06-02 RX ORDER — FERROUS SULFATE TAB EC 324 MG (65 MG FE EQUIVALENT) 324 (65 FE) MG
324 TABLET DELAYED RESPONSE ORAL
Qty: 30 TABLET | Refills: 0 | Status: SHIPPED | OUTPATIENT
Start: 2023-06-02

## 2023-06-02 RX ORDER — ALBUTEROL SULFATE 90 UG/1
2 AEROSOL, METERED RESPIRATORY (INHALATION) EVERY 6 HOURS PRN
Qty: 18 G | Refills: 0 | Status: SHIPPED | OUTPATIENT
Start: 2023-06-02

## 2023-06-02 RX ORDER — REGADENOSON 0.08 MG/ML
0.4 INJECTION, SOLUTION INTRAVENOUS ONCE
Status: COMPLETED | OUTPATIENT
Start: 2023-06-02 | End: 2023-06-02

## 2023-06-02 RX ORDER — LANOLIN ALCOHOL/MO/W.PET/CERES
1000 CREAM (GRAM) TOPICAL DAILY
Qty: 30 TABLET | Refills: 0 | Status: SHIPPED | OUTPATIENT
Start: 2023-06-02

## 2023-06-02 RX ORDER — ATORVASTATIN CALCIUM 40 MG/1
40 TABLET, FILM COATED ORAL
Qty: 30 TABLET | Refills: 0 | Status: SHIPPED | OUTPATIENT
Start: 2023-06-02

## 2023-06-02 RX ORDER — LEVOTHYROXINE SODIUM 88 UG/1
88 TABLET ORAL
Qty: 30 TABLET | Refills: 0 | Status: SHIPPED | OUTPATIENT
Start: 2023-06-03

## 2023-06-02 RX ORDER — POLYETHYLENE GLYCOL 3350 17 G/17G
17 POWDER, FOR SOLUTION ORAL DAILY PRN
Qty: 20 EACH | Refills: 0 | Status: SHIPPED | OUTPATIENT
Start: 2023-06-02

## 2023-06-02 RX ORDER — DOCUSATE SODIUM 100 MG/1
100 CAPSULE, LIQUID FILLED ORAL 2 TIMES DAILY
Qty: 60 CAPSULE | Refills: 0 | Status: SHIPPED | OUTPATIENT
Start: 2023-06-02

## 2023-06-02 RX ORDER — NICOTINE 21 MG/24HR
1 PATCH, TRANSDERMAL 24 HOURS TRANSDERMAL DAILY
Qty: 28 PATCH | Refills: 0 | Status: SHIPPED | OUTPATIENT
Start: 2023-06-03

## 2023-06-02 RX ADMIN — GABAPENTIN 400 MG: 400 CAPSULE ORAL at 17:49

## 2023-06-02 RX ADMIN — ATORVASTATIN CALCIUM 40 MG: 40 TABLET, FILM COATED ORAL at 16:19

## 2023-06-02 RX ADMIN — GABAPENTIN 400 MG: 400 CAPSULE ORAL at 08:26

## 2023-06-02 RX ADMIN — LORATADINE 10 MG: 10 TABLET ORAL at 08:26

## 2023-06-02 RX ADMIN — IRON SUCROSE 200 MG: 20 INJECTION, SOLUTION INTRAVENOUS at 08:27

## 2023-06-02 RX ADMIN — FLUTICASONE PROPIONATE 1 SPRAY: 50 SPRAY, METERED NASAL at 08:27

## 2023-06-02 RX ADMIN — REGADENOSON 0.4 MG: 0.08 INJECTION, SOLUTION INTRAVENOUS at 11:29

## 2023-06-02 RX ADMIN — LEVOTHYROXINE SODIUM 88 MCG: 88 TABLET ORAL at 05:06

## 2023-06-02 RX ADMIN — NICOTINE 1 PATCH: 21 PATCH, EXTENDED RELEASE TRANSDERMAL at 08:27

## 2023-06-02 NOTE — DISCHARGE INSTR - AVS FIRST PAGE
Stop smoking    You will need repeat CBC, TSH, vitamin B12 level checked again with your primary care doctor  Check your blood pressures at home  If your systolic blood pressure (top number) is greater than or equal to 110, then you can take your Lopressor    You can also take Lopressor as needed for palpitations, rapid heart rate    If you start having palpitations or notice rapid heart rate follow-up with your primary doctor    You have been started on iron supplement which can make you constipated and also cause your bowel movements to look dark in color

## 2023-06-02 NOTE — PROGRESS NOTES
Progress Note - Cardiology   [facility]     Marco Antonio Umaña 63 y o  female MRN: [id number]  : 1960  Unit/Bed#: [address] [place] [address] Encounter: [id number]    Assessment and Plan:   1  Supraventricular tachycardia       - Presented on 23 for evaluation for headache and bilateral upper extremity numbness  Patient reports she was feeling her baseline health but after lunch yesterday, 23, she had a sudden onset of headache, facial flushing, and bilateral upper extremity numbness and weakness  - Upon arrival to ED, patient hypotensive 80/51 and tachycardic with heart rate 180s    - 23 EKG noted SVT rate 177 bpm; ST abnormality, possible inferior subendocardial injury  - Patient given adenosine 6 mg IV once and converted to sinus rhythm  - 23 EKG after adenosine: sinus rhythm, rate 85 bpm;  PACs  - Continue Lopressor 12 5 mg twice daily    - Monitor electrolytes  Replete potassium above 4 and magnesium above 2     - Follow up 23 TTE    - Will plan for nuclear stress test today, 23       2  Elevated troponin       - 23 hs troponin: 5 (0hrs), 109 (2hrs), 196 (4hrs)  - 23 hs troponin random: 268   - 23 hs troponin random: 183  - Likely non ischemic myocardial injury secondary to SVT and hypotension on presentation    - Patient denies chest pain  - Follow up 23 TTE    - Will plan for nuclear stress test today, 23       3  Hypothyroidism       - Patient reports she was told she had an abnormal thyroid test in the past but admits she did not follow up  - 23 TSH: 143 129    - 23 Free T4: <0 25   - Currently on levothyroxine 88 mcg daily       4  Hyperlipidemia       - 23 lipid panel: cholesterol 256, triglycerides 88, HDL 71,     - Continue Lipitor 40 mg daily  5  Anemia      - H/H on admission (23) 10 0/32  3    - 23 H/H 7  3    - 23 H/H 8  3    - Care per primary team       6  COPD        - Does not appear in acute exacerbation    - Not on supplemental oxygen  - Care per primary team       7  Depression/anxiety       - Currently on Seroquel 25 mg daily at bedtime prn and atarax 25 mg three times daily prn     - Care per primary team       Subjective / Objective:         No acute events  Patient scheduled for nuclear stress test today, she is agreeable to plan  She reports feeling ok  She denies chest pain, shortness of breath, palpitations, lightheadedness, dizziness, headache, nausea or vomiting  Vitals: Blood pressure 90/55, pulse 62, temperature (!) 96 4 °F (35 8 °C), resp  rate 18, height 5' (1 524 m), weight 51 3 kg (113 lb), SpO2 93 %, not currently breastfeeding  Vitals:    06/01/23 1042   Weight: 51 3 kg (113 lb)     Body mass index is 22 07 kg/m²  BP Readings from Last 3 Encounters:   06/02/23 90/55   01/16/23 122/57   05/23/22 110/70     Orthostatic Blood Pressures    Flowsheet Row Most Recent Value   Blood Pressure 90/55 filed at 06/02/2023 0511   Patient Position - Orthostatic VS Lying filed at 06/01/2023 1921        I/O       05/31 0701  06/01 0700 06/01 0701  06/02 0700 06/02 0701  06/03 0700    P  O   360     IV Piggyback 1000      Total Intake(mL/kg) 1000 360 (7)     Urine (mL/kg/hr)  400 (0 3)     Total Output  400     Net +1000 -40                Invasive Devices     Peripheral Intravenous Line  Duration           Peripheral IV 05/31/23 Left Antecubital 1 day                  Intake/Output Summary (Last 24 hours) at 6/2/2023 0840  Last data filed at 6/2/2023 0601  Gross per 24 hour   Intake 360 ml   Output 400 ml   Net -40 ml     Physical Exam:   Physical Exam  Vitals reviewed  Constitutional:       General: She is not in acute distress  Cardiovascular:      Rate and Rhythm: Normal rate and regular rhythm  Pulses: Normal pulses  Heart sounds: Murmur heard  Pulmonary:      Effort: Pulmonary effort is normal  No respiratory distress     Abdominal:      General: Abdomen is flat  There is no distension  Palpations: Abdomen is soft  Tenderness: There is no abdominal tenderness  Musculoskeletal:      Right lower leg: No edema  Left lower leg: No edema  Skin:     General: Skin is warm and dry  Neurological:      Mental Status: She is alert and oriented to person, place, and time  Medications/ Allergies:     Current Facility-Administered Medications   Medication Dose Route Frequency Provider Last Rate   • acetaminophen  650 mg Oral Q6H PRN BERNABE Mauro     • atorvastatin  40 mg Oral Daily With Dinner Randy Marcus PA-C     • fluticasone  1 spray Nasal Daily BERNABE Mauro     • gabapentin  400 mg Oral BID BERNABE Mauro     • hydrOXYzine HCL  25 mg Oral TID PRN BERNABE Mauro     • iron sucrose  200 mg Intravenous Daily Barbara Crane DO     • levothyroxine  88 mcg Oral Early Morning Barbara Crane DO     • loratadine  10 mg Oral Daily BERNABE Mauro     • metoprolol tartrate  12 5 mg Oral Q12H [facility] BERNABE Mauro     • nicotine  1 patch Transdermal Daily BERNABE Mauro     • QUEtiapine  25 mg Oral HS PRN BERNABE Mauro       acetaminophen, 650 mg, Q6H PRN  hydrOXYzine HCL, 25 mg, TID PRN  QUEtiapine, 25 mg, HS PRN      No Known Allergies    VTE Pharmacologic Prophylaxis: none       Labs:   Troponins:  Results from last 7 days   Lab Units 06/01/23  0925 05/31/23  2305 05/31/23  1946 05/31/23  1735   HS TNI RAND ng/L 183* 268*  --   --    HSTNI D2 ng/L  --   --   --  104*   HSTNI D4 ng/L  --   --  191*  --          CBC with diff:  Results from last 7 days   Lab Units 06/02/23  0519 06/01/23  0503 05/31/23  1537   HEMATOCRIT % 27 3* 26 3* 32 3*   HEMOGLOBIN g/dL 8 1* 7 8* 10 0*   MCH pg 24 5* 24 7* 24 8*   MCHC g/dL 29 7* 29 7* 31 0*   MCV fL 83 83 80*   MPV fL 9 1 9 0 9 0   NRBC AUTO /100 WBCs  --   --  0   PLATELETS Thousands/uL 249 258 343   RBC Million/uL 3 31* 3 16* 4 03   RDW % 17 7* 17 8* 17 3*   WBC Thousand/uL "5 81 6 19 7 87       CMP:  Results from last 7 days   Lab Units 06/02/23  0519 06/01/23  0503 05/31/23  2305 05/31/23  1537   ANION GAP mmol/L 5 3* 3* 6   ALBUMIN g/dL  --  3 5  --  4 6   ALK PHOS U/L  --  25*  --  34   ALT U/L  --  12  --  17   AST U/L  --  30  --  40*   BUN mg/dL 16 11 11 14   CALCIUM mg/dL 8 4 7 9* 8 1* 9 3   CHLORIDE mmol/L 100 105 102 92*   CO2 mmol/L 27 28 28 30   CREATININE mg/dL 0 76 0 83 0 78 0 96   EGFR ml/min/1 73sq m 85 76 82 64   POTASSIUM mmol/L 4 1 5 0 4 1 3 4*   SODIUM mmol/L 132* 136 133* 128*   TOTAL BILIRUBIN mg/dL  --  0 29  --  0 32   TOTAL PROTEIN g/dL  --  5 8*  --  7 6       Magnesium:  Results from last 7 days   Lab Units 06/01/23  0503 05/31/23  1537   MAGNESIUM mg/dL 2 1 2 2     Coags:  Results from last 7 days   Lab Units 05/31/23  1537   INR  0 92   PTT seconds 32     TSH:  Results from last 7 days   Lab Units 06/01/23  0503   TSH 3RD GENERATON uIU/mL 143 129*     No components found for: \"TSH3\"  Lipid Profile:  Results from last 7 days   Lab Units 06/01/23  0503   CHOLESTEROL mg/dL 256*   HDL mg/dL 71   LDL CALC mg/dL 167*   TRIGLYCERIDES mg/dL 88     Hgb A1c:  Results from last 7 days   Lab Units 05/31/23  1537   HEMOGLOBIN A1C % 5 3     NT-proBNP: No results for input(s): \"NTBNP\" in the last 72 hours  Imaging & Testing   I have personally reviewed pertinent reports  XR chest 1 view portable    Result Date: 6/1/2023    Impression: No acute cardiopulmonary disease  Workstation performed: [id number]     CT head without contrast    Result Date: 5/31/2023    Impression: No acute intracranial abnormality  Workstation performed: [id number]        EKG / Monitor: Personally reviewed  Telemetry reviewed: currently sinus rhythm, rate 60 bpm  No events noted  Cardiac testing:   No results found for this or any previous visit      La Dickerson PA-C  "

## 2023-06-02 NOTE — CASE MANAGEMENT
Case Management Discharge Planning Note    Patient name Mario Smith  Location [address]-* MRN [id number]  : 1960 Date 2023       Current Admission Date: 2023  Current Admission Diagnosis:SVT (supraventricular tachycardia) [facility])   Patient Active Problem List    Diagnosis Date Noted   • Hypothyroid 2023   • SVT (supraventricular tachycardia) ([facility] [address] ) 2023   • Depression with anxiety 2023   • Nicotine abuse 2023   • Elevated troponin 2023   • Hyponatremia 2023   • Anemia 2023   • Tingling of both upper extremities 2023   • History of alcohol abuse 2023   • Chronic low back pain 2023   • Acute bronchitis 2019   • [facility] 2019      LOS (days): 2  Geometric Mean LOS (GMLOS) (days): 2 30  Days to GMLOS:0 4     OBJECTIVE:  Risk of Unplanned Readmission Score: 12 23       Current admission status: Inpatient   Preferred Pharmacy:   [address] [place], [place] [id number]  Phone: [phone] Fax: [phone]    [id number] [address]  [address] [address]  Phone: [phone] Fax: [phone]    Primary Care Provider: Devante Figueroa DO    Primary Insurance: [facility]  Secondary Insurance:     DISCHARGE DETAILS:    Discharge planning discussed with[de-identified] Patient     Other Referral/Resources/Interventions Provided:  Interventions: Transportation  Referral Comments: Ly[employer] waiver signed by patient and placed in scan bin for chart    Would you like to participate in our [address] service program?  : No - Declined    Treatment Team Recommendation: Home  Discharge Destination Plan[de-identified] Home  Transport at Discharge : Ride Share  Dispatcher Contacted: Yes  Number/Name of Dispatcher: Roundtrip     ETA of Transport (Date): 23  ETA of Transport (Time): 1800 (requested, nurse and patient aware of requested trip and both will receive notifications of pickup)         IMM Given (Date):: 06/02/23  IMM Given to[de-identified] Patient (IMM reviewed with and signed by patient  Patient is in agreement with discharge determination    Copy given to patient and copy placed in scan bin for chart )

## 2023-06-02 NOTE — DISCHARGE SUMMARY
Greg 47  Discharge- Jenaro Saenz 1960, 63 y o  female MRN: [id number]  Unit/Bed#: [address] [place] [address] Encounter: [id number]  Primary Care Provider: Leana Zhang DO   Date and time admitted to hospital: 5/31/2023  3:10 PM    * SVT (supraventricular tachycardia) [facility])  Assessment & Plan  Patient presented with sudden onset of headache, dizziness, bilateral arm tingling, weakness after eating lunch  Patient arrived to ED hypotensive in   Patient reports remote history of palpitation  · Patient converted to sinus rhythm with IV adenosine in ED  · BP improved after IV fluid bolus  · Initial EKG showed SVT, rate 177  · BPs soft/low at times here  Discussed with cardiology and patient to check her blood pressures first and then if SBP greater than or equal to 110 can take low-dose metoprolol or on as needed basis if she notices palpitation of fast heart rate  This was discussed with patient  · Potassium level within normal limits status post repletion  · Chest x-ray negative for pneumonia  · Telemetry  · Appreciate cardiology input    Elevated troponin  Assessment & Plan  Initial troponin 5, peaked at 268  · Most likely non-MI troponin elevation due to to SVT  · EKG showed SVT, rate 177, marked ST abnormality  · Patient denies chest pain  · lipid panel -  A1c 5 3  · 2D echo showed normal EF with normal diastolic function  · Nuclear stress test normal  · Received full dose aspirin in ED  no indication for aspirin on discharge per cardiology    Anemia  Assessment & Plan  Hemoglobin 10 0, initially unsure if this is accurate  · iron panel reviewed  Started on IV Venofer here - received 2 doses  no obvious signs of bleeding and patient denies any melena, hematochezia, hematemesis, hemoptysis, hematuria   · B12 low at 166  Does not want injection, started po replacement on discharge  folate 13  · Last colonoscopy was greater than 5 years but less than 10 years ago    Per patient was normal and advised to follow-up for repeat colonoscopy in 10 years  · Advised to get repeat lab work after discharge with PCP  started on p o  ferrous sulfate on discharge    Results from last 7 days   Lab Units 06/02/23  0519 06/01/23  0503 05/31/23  1537   HEMATOCRIT % 27 3* 26 3* 32 3*   HEMOGLOBIN g/dL 8 1* 7 8* 10 0*   MCV fL 83 83 80*       Hypothyroid  Assessment & Plan  TSH elevated with low free T4  · Discussed with patient and started levothyroxine at 88 mcg/day (approx 1 6 mcg/kg/day)  · Discussed with patient that she will need repeat lab work after discharge with PCP for adjustment of dosage    Hyponatremia-resolved as of 6/2/2023  Assessment & Plan  · Sodium 128 --> 132 today  · Received NS 2 L in ED  · urine sodium 34, urine osm 260, serum osm 282, uric acid 3 1    Chronic low back pain  Assessment & Plan  · Continue gabapentin    History of alcohol abuse  Assessment & Plan  · Reports sober for 5 years  Nicotine abuse  Assessment & Plan  · Nicotine patch, smoking cessation  Depression with anxiety  Assessment & Plan  · Continue Atarax and Seroquel as needed    Tingling of both upper extremities-resolved as of 6/2/2023  Assessment & Plan  Reports both arms were tingling when in SVT  · CT head no acute findings  · Takes gabapentin at home for back pain, being continued      Hypokalemia-resolved as of 6/1/2023  Assessment & Plan  · Resolved with repletion      Medical Problems     Resolved Problems  Date Reviewed: 6/2/2023          Resolved    Hyponatremia 6/2/2023     Resolved by  Rosalina Rhodes DO    Hypokalemia 6/1/2023     Resolved by  Rosalina Rhodes DO    Tingling of both upper extremities 6/2/2023     Resolved by  Rosalina Rhodes DO        Discharging Physician / Practitioner: Rosalina Rhodes DO  PCP: Khadijah De Paz DO  Admission Date:   Admission Orders (From admission, onward)     Ordered        05/31/23 1938  INPATIENT ADMISSION  Once                      Discharge Date: 06/02/23    Consultations During Hospital Stay:  · cardiology    Procedures Performed:   · echoCardiogram  · Stress test    Significant Findings / Test Results:   · See above    Incidental Findings:   · none    Test Results Pending at Discharge (will require follow up):   · none     Outpatient Tests Requested:  · TSH, Vitamin B02, CBC    Complications:  none    Reason for Admission: sVT    Hospital Course:   Paz Paris is a 63 y o  female patient who originally presented to the hospital on 5/31/2023 due to Sudden onset of dizziness, headache, bilateral arm tingling and weakness after eating lunch  Patient denied any chest pain, shortness of breath  In the ER she was noted to be hypotensive and in SVT of 180  Received IV adenosine and converted to sinus rhythm  Patient was admitted and monitored on telemetry  Seen by cardiology and underwent cardiac work-up  Patient also noted to have other significant lab abnormalities and was started on new medications  Advised to be compliant with all of her medications and to follow-up with PCP after discharge for further management  Cleared by cardiology prior to discharge  Discharge plan discussed in details with patient    Please see above list of diagnoses and related plan for additional information  Condition at Discharge: stable    Discharge Day Visit / Exam:   Subjective: rePorts feeling fatigued and easily tired  Denies any chest pain, palpitations    Vitals: Blood Pressure: 123/77 (06/02/23 1613)  Pulse: 69 (06/02/23 1613)  Temperature: 97 5 °F (36 4 °C) (06/02/23 1613)  Temp Source: Oral (06/01/23 1921)  Respirations: 18 (06/01/23 1951)  Height: 5' (152 4 cm) (06/02/23 1124)  Weight - Scale: 51 3 kg (113 lb) (06/02/23 1124)  SpO2: 93 % (06/02/23 1613)  Exam:   Physical Exam  Constitutional:       General: She is not in acute distress  Appearance: She is not toxic-appearing  Comments: Thin female   HENT:      Head: Normocephalic and atraumatic  Eyes:      General: No scleral icterus  Cardiovascular:      Rate and Rhythm: Normal rate and regular rhythm  Pulmonary:      Effort: Pulmonary effort is normal  No respiratory distress  Breath sounds: Normal breath sounds  No wheezing or rales  Abdominal:      General: Bowel sounds are normal  There is no distension  Palpations: Abdomen is soft  Tenderness: There is no abdominal tenderness  Musculoskeletal:      Right lower leg: No edema  Left lower leg: No edema  Comments: (+) scoliosis   Neurological:      Mental Status: She is alert and oriented to person, place, and time  Discussion with Family: Patient declined call to   Discharge instructions/Information to patient and family:   See after visit summary for information provided to patient and family  Provisions for Follow-Up Care:  See after visit summary for information related to follow-up care and any pertinent home health orders  Disposition:   Home    Planned Readmission: no     Discharge Statement:  I spent > 30 minutes discharging the patient  This time was spent on the day of discharge  I had direct contact with the patient on the day of discharge  Greater than 50% of the total time was spent examining patient, answering all patient questions, arranging and discussing plan of care with patient as well as directly providing post-discharge instructions  Additional time then spent on discharge activities  Discharge Medications:  See after visit summary for reconciled discharge medications provided to patient and/or family        **Please Note: This note may have been constructed using a voice recognition system**

## 2023-06-03 NOTE — ASSESSMENT & PLAN NOTE
Initial troponin 5, peaked at 268  · Most likely non-MI troponin elevation due to to SVT  · EKG showed SVT, rate 177, marked ST abnormality  · Patient denies chest pain  · lipid panel -  A1c 5 3  · 2D echo showed normal EF with normal diastolic function  · Nuclear stress test normal  · Received full dose aspirin in ED   no indication for aspirin on discharge per cardiology

## 2023-06-03 NOTE — ASSESSMENT & PLAN NOTE
TSH elevated with low free T4  · Discussed with patient and started levothyroxine at 88 mcg/day (approx 1 6 mcg/kg/day)     · Discussed with patient that she will need repeat lab work after discharge with PCP for adjustment of dosage

## 2023-06-03 NOTE — ASSESSMENT & PLAN NOTE
· Sodium 128 --> 132 today  · Received NS 2 L in ED  · urine sodium 34, urine osm 260, serum osm 282, uric acid 3 1

## 2023-06-03 NOTE — ASSESSMENT & PLAN NOTE
Patient presented with sudden onset of headache, dizziness, bilateral arm tingling, weakness after eating lunch  Patient arrived to ED hypotensive in   Patient reports remote history of palpitation  · Patient converted to sinus rhythm with IV adenosine in ED  · BP improved after IV fluid bolus  · Initial EKG showed SVT, rate 177  · BPs soft/low at times here  Discussed with cardiology and patient to check her blood pressures first and then if SBP greater than or equal to 110 can take low-dose metoprolol or on as needed basis if she notices palpitation of fast heart rate    This was discussed with patient  · Potassium level within normal limits status post repletion  · Chest x-ray negative for pneumonia  · Telemetry  · Appreciate cardiology input

## 2023-06-03 NOTE — ASSESSMENT & PLAN NOTE
Hemoglobin 10 0, initially unsure if this is accurate  · iron panel reviewed  Started on IV Venofer here - received 2 doses  no obvious signs of bleeding and patient denies any melena, hematochezia, hematemesis, hemoptysis, hematuria   · B12 low at 166  Does not want injection, started po replacement on discharge  folate 13  · Last colonoscopy was greater than 5 years but less than 10 years ago  Per patient was normal and advised to follow-up for repeat colonoscopy in 10 years  · Advised to get repeat lab work after discharge with PCP    started on p o  ferrous sulfate on discharge    Results from last 7 days   Lab Units 06/02/23  0519 06/01/23  0503 05/31/23  1537   HEMATOCRIT % 27 3* 26 3* 32 3*   HEMOGLOBIN g/dL 8 1* 7 8* 10 0*   MCV fL 83 83 80*

## 2023-06-04 LAB — BACTERIA BLD CULT: NORMAL

## 2023-06-05 LAB — BACTERIA BLD CULT: NORMAL

## 2023-06-05 NOTE — UTILIZATION REVIEW
NOTIFICATION OF ADMISSION DISCHARGE   This is a Notification of Discharge from [address]  Please be advised that this patient has been discharge from our facility  Below you will find the admission and discharge date and time including the patient’s disposition  UTILIZATION REVIEW CONTACT:  Declan Callejas  Utilization   Network Utilization Review Department  Phone: [phone] x carefully listen to the prompts  All voicemails are confidential   Email: [email or web address]     ADMISSION INFORMATION  PRESENTATION DATE: 5/31/2023  3:10 PM  OBERVATION ADMISSION DATE:   INPATIENT ADMISSION DATE: 5/31/23  7:38 PM   DISCHARGE DATE: 6/2/2023  6:21 PM   DISPOSITION:Home/Self Care    IMPORTANT INFORMATION:  Send all requests for admission clinical reviews, approved or denied determinations and any other requests to dedicated fax number below belonging to the campus where the patient is receiving treatment   List of dedicated fax numbers:  [address] DENIALS (Administrative/Medical Necessity) [phone]   [address] (Maternity/NICU/Pediatrics) [phone]   [facility] [phone]   [facility] 87 [phone]   [address] [phone]   220 [facility] [phone] 90 [facility] [phone]   [address] [place] [address] 119 [phone]   [facility]  [phone]   4053 [facility] [phone]   412 [facility] 850 [address] [facility] [phone]

## 2023-06-13 ENCOUNTER — OFFICE VISIT (OUTPATIENT)
Dept: FAMILY MEDICINE CLINIC | Facility: CLINIC | Age: 63
End: 2023-06-13
Payer: COMMERCIAL

## 2023-06-13 VITALS
OXYGEN SATURATION: 96 % | HEART RATE: 84 BPM | DIASTOLIC BLOOD PRESSURE: 55 MMHG | WEIGHT: 117 LBS | BODY MASS INDEX: 22.85 KG/M2 | SYSTOLIC BLOOD PRESSURE: 115 MMHG | RESPIRATION RATE: 16 BRPM

## 2023-06-13 DIAGNOSIS — I47.1 SVT (SUPRAVENTRICULAR TACHYCARDIA) (HCC): Primary | ICD-10-CM

## 2023-06-13 DIAGNOSIS — Z09 HOSPITAL DISCHARGE FOLLOW-UP: ICD-10-CM

## 2023-06-13 DIAGNOSIS — K59.03 DRUG-INDUCED CONSTIPATION: ICD-10-CM

## 2023-06-13 DIAGNOSIS — Z11.59 NEED FOR HEPATITIS C SCREENING TEST: ICD-10-CM

## 2023-06-13 DIAGNOSIS — D51.9 ANEMIA DUE TO VITAMIN B12 DEFICIENCY, UNSPECIFIED B12 DEFICIENCY TYPE: ICD-10-CM

## 2023-06-13 DIAGNOSIS — R20.2 PARESTHESIA OF BOTH HANDS: ICD-10-CM

## 2023-06-13 DIAGNOSIS — D50.9 IRON DEFICIENCY ANEMIA, UNSPECIFIED IRON DEFICIENCY ANEMIA TYPE: ICD-10-CM

## 2023-06-13 DIAGNOSIS — E03.9 ACQUIRED HYPOTHYROIDISM: ICD-10-CM

## 2023-06-13 DIAGNOSIS — L98.9 SKIN LESION: ICD-10-CM

## 2023-06-13 DIAGNOSIS — Z11.4 SCREENING FOR HIV (HUMAN IMMUNODEFICIENCY VIRUS): ICD-10-CM

## 2023-06-13 PROCEDURE — 99214 OFFICE O/P EST MOD 30 MIN: CPT | Performed by: FAMILY MEDICINE

## 2023-07-03 ENCOUNTER — OFFICE VISIT (OUTPATIENT)
Dept: FAMILY MEDICINE CLINIC | Facility: CLINIC | Age: 63
End: 2023-07-03
Payer: COMMERCIAL

## 2023-07-03 VITALS
RESPIRATION RATE: 21 BRPM | SYSTOLIC BLOOD PRESSURE: 124 MMHG | HEIGHT: 59 IN | TEMPERATURE: 98.4 F | WEIGHT: 108.19 LBS | HEART RATE: 94 BPM | OXYGEN SATURATION: 97 % | DIASTOLIC BLOOD PRESSURE: 72 MMHG | BODY MASS INDEX: 21.81 KG/M2

## 2023-07-03 DIAGNOSIS — J20.9 ACUTE BRONCHITIS, UNSPECIFIED ORGANISM: ICD-10-CM

## 2023-07-03 DIAGNOSIS — E78.5 HLD (HYPERLIPIDEMIA): ICD-10-CM

## 2023-07-03 DIAGNOSIS — E03.9 HYPOTHYROID: ICD-10-CM

## 2023-07-03 DIAGNOSIS — H61.23 BILATERAL IMPACTED CERUMEN: Primary | ICD-10-CM

## 2023-07-03 DIAGNOSIS — L82.0 SEBORRHEIC KERATOSES, INFLAMED: ICD-10-CM

## 2023-07-03 DIAGNOSIS — I47.1 SVT (SUPRAVENTRICULAR TACHYCARDIA) (HCC): ICD-10-CM

## 2023-07-03 PROCEDURE — 69210 REMOVE IMPACTED EAR WAX UNI: CPT | Performed by: FAMILY MEDICINE

## 2023-07-03 PROCEDURE — 99213 OFFICE O/P EST LOW 20 MIN: CPT | Performed by: FAMILY MEDICINE

## 2023-07-03 RX ORDER — GABAPENTIN 400 MG/1
400 CAPSULE ORAL 3 TIMES DAILY
COMMUNITY
Start: 2023-06-12

## 2023-07-03 RX ORDER — ALBUTEROL SULFATE 90 UG/1
2 AEROSOL, METERED RESPIRATORY (INHALATION) EVERY 6 HOURS PRN
Qty: 18 G | Refills: 0 | Status: SHIPPED | OUTPATIENT
Start: 2023-07-03

## 2023-07-03 RX ORDER — LEVOTHYROXINE SODIUM 88 UG/1
88 TABLET ORAL
Qty: 30 TABLET | Refills: 0 | Status: SHIPPED | OUTPATIENT
Start: 2023-07-03

## 2023-07-03 RX ORDER — LANOLIN ALCOHOL/MO/W.PET/CERES
CREAM (GRAM) TOPICAL
COMMUNITY
Start: 2023-06-02

## 2023-07-03 RX ORDER — ATORVASTATIN CALCIUM 40 MG/1
40 TABLET, FILM COATED ORAL
Qty: 30 TABLET | Refills: 0 | Status: SHIPPED | OUTPATIENT
Start: 2023-07-03

## 2023-07-03 RX ORDER — FERROUS SULFATE 325(65) MG
TABLET ORAL
COMMUNITY
Start: 2023-06-02

## 2023-07-03 RX ORDER — POLYETHYLENE GLYCOL 3350 17 G/17G
POWDER, FOR SOLUTION ORAL
COMMUNITY
Start: 2023-06-02

## 2023-07-03 NOTE — TELEPHONE ENCOUNTER
Patient needs a refill on atorvastatin 40mg tablet, the levothyroxine 88mcg tablet, metoprolol tartrate 25 mg tablet, and the albuterol 90mcg/act inhaler. She has enough to last until Thursday.      [facility]: [facility]

## 2023-07-03 NOTE — PROGRESS NOTES
[facility] Office visit    Assessment/Plan:     1. Bilateral impacted cerumen  Assessment & Plan:  Impacted cerumen, bilateral   History of needing multiple cerumen removals in office     · Cerumen removed in office with irrigation and curette instrumentation   · Patient tolerated procedure well, no immediate complications     Orders:  -     Ear cerumen removal    2. Seborrheic keratoses, inflamed  Assessment & Plan:  Benign seborrheic keratosis noted on right side of face, near ear   Brown in color, inflamed on exam     · Patient to schedule follow up procedure appointment for removal           Return for f/up for numbness/tingling in hands, procedure visit for facial mole removal .     Subjective:   PRETTY Levy is a 63 y.o. female who is here today to follow up on her ears. She has been using Debrox on/off for a while and still feels fullness in her ears. She would like her ears cleaned today. She also notes a new mole on the right side of her face by her ear. Per patient, she first noticed it about 6 months ago and reports that it has gotten bigger in size and darker in color in that time. It is a brownish color, patient has been covering it with makeup. She denies itchiness, pain, swelling, or erythema in the region of the mole. Review of Systems   Constitutional: Positive for unexpected weight change. Negative for appetite change, chills and diaphoresis. HENT: Positive for sinus pressure. Negative for congestion and ear pain. Eyes: Negative for pain, redness and itching. Respiratory: Negative for cough, chest tightness and shortness of breath. Cardiovascular: Negative for chest pain and palpitations. Gastrointestinal: Negative for abdominal pain, constipation, diarrhea, nausea and vomiting. Genitourinary: Negative for difficulty urinating, dysuria and hematuria. Musculoskeletal: Negative for arthralgias, back pain and myalgias.    Skin: Positive for rash (mole on right side face). Negative for color change and pallor. Neurological: Negative for dizziness, light-headedness and headaches. All other systems reviewed and are negative. Objective:     /72 (BP Location: Left arm, Patient Position: Sitting, Cuff Size: Standard)   Pulse 94   Temp 98.4 °F (36.9 °C) (Temporal)   Resp 21   Ht 4' 10.5" (1.486 m)   Wt 49.1 kg (108 lb 3 oz)   SpO2 97%   BMI 22.23 kg/m²        Physical Exam  Vitals reviewed. Constitutional:       Appearance: She is normal weight. HENT:      Right Ear: External ear normal. There is impacted cerumen. Left Ear: Ear canal and external ear normal. There is impacted cerumen. Eyes:      General: No scleral icterus. Extraocular Movements: Extraocular movements intact. Conjunctiva/sclera: Conjunctivae normal.   Cardiovascular:      Rate and Rhythm: Normal rate and regular rhythm. Heart sounds: Normal heart sounds. No murmur heard. Pulmonary:      Effort: Pulmonary effort is normal. No respiratory distress. Breath sounds: Normal breath sounds. Abdominal:      General: Abdomen is flat. Bowel sounds are normal.      Palpations: Abdomen is soft. Tenderness: There is no abdominal tenderness. Musculoskeletal:      Cervical back: Normal range of motion. Right lower leg: No edema. Left lower leg: No edema. Skin:     General: Skin is warm. Neurological:      Mental Status: She is alert and oriented to person, place, and time. Psychiatric:         Mood and Affect: Mood normal.         Behavior: Behavior normal.       Ear cerumen removal    Date/Time: 7/3/2023 11:00 AM    Performed by: Jerry Culver DO  Authorized by: Dustin Adams MD  Universal Protocol:  Procedure performed by: Jerry Culver DO. Savita Garcia, [place] Student )  Consent: Verbal consent obtained.   Consent given by: patient  Patient understanding: patient states understanding of the procedure being performed      Patient location: Bedside  Procedure details:     Location:  L ear and R ear    Procedure type: irrigation with instrumentation      Instrumentation: curette    Post-procedure details:     Patient tolerance of procedure:   Tolerated well, no immediate complications  Comments:      Successful removal of impacted cerumen bilaterally           ** Please Note: This note has been constructed using a voice recognition system **     Genevieve Hughes DO  07/03/23  4:25 PM

## 2023-07-03 NOTE — ASSESSMENT & PLAN NOTE
Benign seborrheic keratosis noted on right side of face, near ear   Brown in color, inflamed on exam     · Patient to schedule follow up procedure appointment for removal

## 2023-07-03 NOTE — ASSESSMENT & PLAN NOTE
Impacted cerumen, bilateral   History of needing multiple cerumen removals in office     · Cerumen removed in office with irrigation and curette instrumentation   · Patient tolerated procedure well, no immediate complications

## 2023-07-23 ENCOUNTER — HOSPITAL ENCOUNTER (INPATIENT)
Facility: HOSPITAL | Age: 63
LOS: 4 days | Discharge: HOME | DRG: 286 | End: 2023-07-27
Attending: EMERGENCY MEDICINE | Admitting: FAMILY MEDICINE
Payer: COMMERCIAL

## 2023-07-23 DIAGNOSIS — R09.02 OXYGEN DESATURATION: ICD-10-CM

## 2023-07-23 DIAGNOSIS — J44.9 CHRONIC OBSTRUCTIVE PULMONARY DISEASE (HCC): ICD-10-CM

## 2023-07-23 DIAGNOSIS — E03.9 HYPOTHYROID: ICD-10-CM

## 2023-07-23 DIAGNOSIS — R51.9 HEADACHE: ICD-10-CM

## 2023-07-23 DIAGNOSIS — L82.0 SEBORRHEIC KERATOSES, INFLAMED: ICD-10-CM

## 2023-07-23 DIAGNOSIS — F41.8 DEPRESSION WITH ANXIETY: ICD-10-CM

## 2023-07-23 DIAGNOSIS — R79.89 ELEVATED TSH: ICD-10-CM

## 2023-07-23 DIAGNOSIS — I95.9 HYPOTENSION: ICD-10-CM

## 2023-07-23 DIAGNOSIS — Z72.0 NICOTINE ABUSE: ICD-10-CM

## 2023-07-23 DIAGNOSIS — E87.6 HYPOKALEMIA: ICD-10-CM

## 2023-07-23 DIAGNOSIS — K59.00 CONSTIPATION: ICD-10-CM

## 2023-07-23 DIAGNOSIS — I10 PRIMARY HYPERTENSION: ICD-10-CM

## 2023-07-23 DIAGNOSIS — E83.42 HYPOMAGNESEMIA: ICD-10-CM

## 2023-07-23 DIAGNOSIS — I47.1 SVT (SUPRAVENTRICULAR TACHYCARDIA) (HCC): Primary | ICD-10-CM

## 2023-07-23 PROBLEM — F10.11 HISTORY OF ALCOHOL ABUSE: Status: RESOLVED | Noted: 2023-05-31 | Resolved: 2023-07-23

## 2023-07-23 PROBLEM — J20.9 ACUTE BRONCHITIS: Status: RESOLVED | Noted: 2019-05-17 | Resolved: 2023-07-23

## 2023-07-23 PROBLEM — R77.8 ELEVATED TROPONIN: Status: RESOLVED | Noted: 2023-05-31 | Resolved: 2023-07-23

## 2023-07-23 PROBLEM — H61.23 BILATERAL IMPACTED CERUMEN: Status: RESOLVED | Noted: 2023-07-03 | Resolved: 2023-07-23

## 2023-07-23 LAB
2HR DELTA HS TROPONIN: >5 NG/L
4HR DELTA HS TROPONIN: >79 NG/L
ALBUMIN SERPL BCP-MCNC: 3.3 G/DL (ref 3.5–5)
ALP SERPL-CCNC: 38 U/L (ref 34–104)
ALT SERPL W P-5'-P-CCNC: 9 U/L (ref 7–52)
ANION GAP SERPL CALCULATED.3IONS-SCNC: 8 MMOL/L
APTT PPP: 31 SECONDS (ref 23–37)
AST SERPL W P-5'-P-CCNC: 14 U/L (ref 13–39)
BACTERIA UR QL AUTO: NORMAL /HPF
BASOPHILS # BLD AUTO: 0.12 THOUSANDS/ÂΜL (ref 0–0.1)
BASOPHILS NFR BLD AUTO: 1 % (ref 0–1)
BILIRUB SERPL-MCNC: 0.33 MG/DL (ref 0.2–1)
BILIRUB UR QL STRIP: NEGATIVE
BUN SERPL-MCNC: 10 MG/DL (ref 5–25)
CALCIUM ALBUM COR SERPL-MCNC: 8.3 MG/DL (ref 8.3–10.1)
CALCIUM SERPL-MCNC: 7.7 MG/DL (ref 8.4–10.2)
CARDIAC TROPONIN I PNL SERPL HS: 7 NG/L
CARDIAC TROPONIN I PNL SERPL HS: 81 NG/L
CARDIAC TROPONIN I PNL SERPL HS: <2 NG/L
CHLORIDE SERPL-SCNC: 104 MMOL/L (ref 96–108)
CLARITY UR: CLEAR
CO2 SERPL-SCNC: 24 MMOL/L (ref 21–32)
COLOR UR: YELLOW
CREAT SERPL-MCNC: 0.68 MG/DL (ref 0.6–1.3)
EOSINOPHIL # BLD AUTO: 0.28 THOUSAND/ÂΜL (ref 0–0.61)
EOSINOPHIL NFR BLD AUTO: 3 % (ref 0–6)
ERYTHROCYTE [DISTWIDTH] IN BLOOD BY AUTOMATED COUNT: 20.2 % (ref 11.6–15.1)
GFR SERPL CREATININE-BSD FRML MDRD: 95 ML/MIN/1.73SQ M
GLUCOSE SERPL-MCNC: 92 MG/DL (ref 65–140)
GLUCOSE UR STRIP-MCNC: NEGATIVE MG/DL
HCT VFR BLD AUTO: 44.6 % (ref 34.8–46.1)
HGB BLD-MCNC: 14.3 G/DL (ref 11.5–15.4)
HGB UR QL STRIP.AUTO: NEGATIVE
IMM GRANULOCYTES # BLD AUTO: 0.03 THOUSAND/UL (ref 0–0.2)
IMM GRANULOCYTES NFR BLD AUTO: 0 % (ref 0–2)
INR PPP: 1.06 (ref 0.84–1.19)
KETONES UR STRIP-MCNC: NEGATIVE MG/DL
LEUKOCYTE ESTERASE UR QL STRIP: NEGATIVE
LYMPHOCYTES # BLD AUTO: 2.98 THOUSANDS/ÂΜL (ref 0.6–4.47)
LYMPHOCYTES NFR BLD AUTO: 28 % (ref 14–44)
MAGNESIUM SERPL-MCNC: 1.6 MG/DL (ref 1.9–2.7)
MCH RBC QN AUTO: 29.4 PG (ref 26.8–34.3)
MCHC RBC AUTO-ENTMCNC: 32.1 G/DL (ref 31.4–37.4)
MCV RBC AUTO: 92 FL (ref 82–98)
MONOCYTES # BLD AUTO: 0.66 THOUSAND/ÂΜL (ref 0.17–1.22)
MONOCYTES NFR BLD AUTO: 6 % (ref 4–12)
NEUTROPHILS # BLD AUTO: 6.67 THOUSANDS/ÂΜL (ref 1.85–7.62)
NEUTS SEG NFR BLD AUTO: 62 % (ref 43–75)
NITRITE UR QL STRIP: NEGATIVE
NON-SQ EPI CELLS URNS QL MICRO: NORMAL /HPF
NRBC BLD AUTO-RTO: 0 /100 WBCS
PH UR STRIP.AUTO: 6 [PH]
PHOSPHATE SERPL-MCNC: 3.6 MG/DL (ref 2.3–4.1)
PLATELET # BLD AUTO: 268 THOUSANDS/UL (ref 149–390)
PLATELET # BLD AUTO: 341 THOUSANDS/UL (ref 149–390)
PMV BLD AUTO: 9.3 FL (ref 8.9–12.7)
PMV BLD AUTO: 9.6 FL (ref 8.9–12.7)
POTASSIUM SERPL-SCNC: 3.2 MMOL/L (ref 3.5–5.3)
PROT SERPL-MCNC: 5.4 G/DL (ref 6.4–8.4)
PROT UR STRIP-MCNC: NEGATIVE MG/DL
PROTHROMBIN TIME: 13.9 SECONDS (ref 11.6–14.5)
RBC # BLD AUTO: 4.86 MILLION/UL (ref 3.81–5.12)
RBC #/AREA URNS AUTO: NORMAL /HPF
SODIUM SERPL-SCNC: 136 MMOL/L (ref 135–147)
SP GR UR STRIP.AUTO: 1.01 (ref 1–1.03)
TSH SERPL DL<=0.05 MIU/L-ACNC: 24.9 UIU/ML (ref 0.45–4.5)
UROBILINOGEN UR QL STRIP.AUTO: 0.2 E.U./DL
WBC # BLD AUTO: 10.74 THOUSAND/UL (ref 4.31–10.16)
WBC #/AREA URNS AUTO: NORMAL /HPF

## 2023-07-23 PROCEDURE — 85025 COMPLETE CBC W/AUTO DIFF WBC: CPT | Performed by: EMERGENCY MEDICINE

## 2023-07-23 PROCEDURE — 96365 THER/PROPH/DIAG IV INF INIT: CPT

## 2023-07-23 PROCEDURE — 83735 ASSAY OF MAGNESIUM: CPT | Performed by: EMERGENCY MEDICINE

## 2023-07-23 PROCEDURE — 80053 COMPREHEN METABOLIC PANEL: CPT | Performed by: EMERGENCY MEDICINE

## 2023-07-23 PROCEDURE — 85730 THROMBOPLASTIN TIME PARTIAL: CPT | Performed by: STUDENT IN AN ORGANIZED HEALTH CARE EDUCATION/TRAINING PROGRAM

## 2023-07-23 PROCEDURE — 85049 AUTOMATED PLATELET COUNT: CPT | Performed by: STUDENT IN AN ORGANIZED HEALTH CARE EDUCATION/TRAINING PROGRAM

## 2023-07-23 PROCEDURE — 84484 ASSAY OF TROPONIN QUANT: CPT | Performed by: STUDENT IN AN ORGANIZED HEALTH CARE EDUCATION/TRAINING PROGRAM

## 2023-07-23 PROCEDURE — 84439 ASSAY OF FREE THYROXINE: CPT | Performed by: EMERGENCY MEDICINE

## 2023-07-23 PROCEDURE — 84443 ASSAY THYROID STIM HORMONE: CPT | Performed by: EMERGENCY MEDICINE

## 2023-07-23 PROCEDURE — 84484 ASSAY OF TROPONIN QUANT: CPT | Performed by: EMERGENCY MEDICINE

## 2023-07-23 PROCEDURE — 93005 ELECTROCARDIOGRAM TRACING: CPT

## 2023-07-23 PROCEDURE — 96368 THER/DIAG CONCURRENT INF: CPT

## 2023-07-23 PROCEDURE — 81001 URINALYSIS AUTO W/SCOPE: CPT | Performed by: STUDENT IN AN ORGANIZED HEALTH CARE EDUCATION/TRAINING PROGRAM

## 2023-07-23 PROCEDURE — 85610 PROTHROMBIN TIME: CPT | Performed by: STUDENT IN AN ORGANIZED HEALTH CARE EDUCATION/TRAINING PROGRAM

## 2023-07-23 PROCEDURE — 96375 TX/PRO/DX INJ NEW DRUG ADDON: CPT

## 2023-07-23 PROCEDURE — 96361 HYDRATE IV INFUSION ADD-ON: CPT

## 2023-07-23 PROCEDURE — 84100 ASSAY OF PHOSPHORUS: CPT | Performed by: EMERGENCY MEDICINE

## 2023-07-23 PROCEDURE — 99285 EMERGENCY DEPT VISIT HI MDM: CPT

## 2023-07-23 PROCEDURE — 99285 EMERGENCY DEPT VISIT HI MDM: CPT | Performed by: EMERGENCY MEDICINE

## 2023-07-23 PROCEDURE — 36415 COLL VENOUS BLD VENIPUNCTURE: CPT | Performed by: EMERGENCY MEDICINE

## 2023-07-23 RX ORDER — FLUTICASONE PROPIONATE 50 MCG
1 SPRAY, SUSPENSION (ML) NASAL DAILY
Status: DISCONTINUED | OUTPATIENT
Start: 2023-07-24 | End: 2023-07-27 | Stop reason: HOSPADM

## 2023-07-23 RX ORDER — HYDROXYZINE HYDROCHLORIDE 25 MG/1
25 TABLET, FILM COATED ORAL 3 TIMES DAILY PRN
Status: DISCONTINUED | OUTPATIENT
Start: 2023-07-23 | End: 2023-07-27 | Stop reason: HOSPADM

## 2023-07-23 RX ORDER — ENOXAPARIN SODIUM 100 MG/ML
40 INJECTION SUBCUTANEOUS DAILY
Status: DISCONTINUED | OUTPATIENT
Start: 2023-07-24 | End: 2023-07-23

## 2023-07-23 RX ORDER — LEVOTHYROXINE SODIUM 0.1 MG/1
100 TABLET ORAL
Status: DISCONTINUED | OUTPATIENT
Start: 2023-07-24 | End: 2023-07-27 | Stop reason: HOSPADM

## 2023-07-23 RX ORDER — GABAPENTIN 400 MG/1
400 CAPSULE ORAL 2 TIMES DAILY
Status: DISCONTINUED | OUTPATIENT
Start: 2023-07-23 | End: 2023-07-27 | Stop reason: HOSPADM

## 2023-07-23 RX ORDER — DIPHENOXYLATE HYDROCHLORIDE AND ATROPINE SULFATE 2.5; .025 MG/1; MG/1
1 TABLET ORAL DAILY
COMMUNITY

## 2023-07-23 RX ORDER — DOCUSATE SODIUM 100 MG/1
100 CAPSULE, LIQUID FILLED ORAL 2 TIMES DAILY
Status: DISCONTINUED | OUTPATIENT
Start: 2023-07-23 | End: 2023-07-27 | Stop reason: HOSPADM

## 2023-07-23 RX ORDER — ADENOSINE 3 MG/ML
6 INJECTION INTRAVENOUS ONCE
Status: COMPLETED | OUTPATIENT
Start: 2023-07-23 | End: 2023-07-23

## 2023-07-23 RX ORDER — QUETIAPINE FUMARATE 25 MG/1
25 TABLET, FILM COATED ORAL
Status: DISCONTINUED | OUTPATIENT
Start: 2023-07-23 | End: 2023-07-27 | Stop reason: HOSPADM

## 2023-07-23 RX ORDER — ACETAMINOPHEN 325 MG/1
650 TABLET ORAL EVERY 6 HOURS PRN
Status: DISCONTINUED | OUTPATIENT
Start: 2023-07-23 | End: 2023-07-24

## 2023-07-23 RX ORDER — FERROUS SULFATE 325(65) MG
325 TABLET ORAL
Status: DISCONTINUED | OUTPATIENT
Start: 2023-07-24 | End: 2023-07-23

## 2023-07-23 RX ORDER — ONDANSETRON 2 MG/ML
4 INJECTION INTRAMUSCULAR; INTRAVENOUS EVERY 6 HOURS PRN
Status: DISCONTINUED | OUTPATIENT
Start: 2023-07-23 | End: 2023-07-27 | Stop reason: HOSPADM

## 2023-07-23 RX ORDER — ATORVASTATIN CALCIUM 40 MG/1
40 TABLET, FILM COATED ORAL
Status: DISCONTINUED | OUTPATIENT
Start: 2023-07-24 | End: 2023-07-27 | Stop reason: HOSPADM

## 2023-07-23 RX ORDER — ONDANSETRON 2 MG/ML
4 INJECTION INTRAMUSCULAR; INTRAVENOUS ONCE
Status: COMPLETED | OUTPATIENT
Start: 2023-07-23 | End: 2023-07-23

## 2023-07-23 RX ORDER — ONDANSETRON 2 MG/ML
INJECTION INTRAMUSCULAR; INTRAVENOUS
Status: COMPLETED
Start: 2023-07-23 | End: 2023-07-23

## 2023-07-23 RX ORDER — LEVOTHYROXINE SODIUM 88 UG/1
88 TABLET ORAL
Status: DISCONTINUED | OUTPATIENT
Start: 2023-07-24 | End: 2023-07-23

## 2023-07-23 RX ORDER — POTASSIUM CHLORIDE 14.9 MG/ML
20 INJECTION INTRAVENOUS ONCE
Status: COMPLETED | OUTPATIENT
Start: 2023-07-23 | End: 2023-07-23

## 2023-07-23 RX ORDER — ALBUTEROL SULFATE 90 UG/1
2 AEROSOL, METERED RESPIRATORY (INHALATION) EVERY 6 HOURS PRN
Status: CANCELLED | OUTPATIENT
Start: 2023-07-23

## 2023-07-23 RX ORDER — FERROUS SULFATE 325(65) MG
325 TABLET ORAL EVERY OTHER DAY
Status: DISCONTINUED | OUTPATIENT
Start: 2023-07-24 | End: 2023-07-27 | Stop reason: HOSPADM

## 2023-07-23 RX ORDER — NICOTINE 21 MG/24HR
21 PATCH, TRANSDERMAL 24 HOURS TRANSDERMAL DAILY
Status: DISCONTINUED | OUTPATIENT
Start: 2023-07-24 | End: 2023-07-27 | Stop reason: HOSPADM

## 2023-07-23 RX ORDER — MAGNESIUM SULFATE HEPTAHYDRATE 40 MG/ML
2 INJECTION, SOLUTION INTRAVENOUS ONCE
Status: COMPLETED | OUTPATIENT
Start: 2023-07-23 | End: 2023-07-23

## 2023-07-23 RX ORDER — ADENOSINE 3 MG/ML
INJECTION, SOLUTION INTRAVENOUS
Status: COMPLETED
Start: 2023-07-23 | End: 2023-07-23

## 2023-07-23 RX ORDER — POTASSIUM CHLORIDE 20 MEQ/1
40 TABLET, EXTENDED RELEASE ORAL ONCE
Status: COMPLETED | OUTPATIENT
Start: 2023-07-23 | End: 2023-07-23

## 2023-07-23 RX ORDER — CALCIUM CARBONATE 500 MG/1
1000 TABLET, CHEWABLE ORAL DAILY PRN
Status: DISCONTINUED | OUTPATIENT
Start: 2023-07-23 | End: 2023-07-27 | Stop reason: HOSPADM

## 2023-07-23 RX ORDER — LORATADINE 10 MG/1
10 TABLET ORAL DAILY
Status: DISCONTINUED | OUTPATIENT
Start: 2023-07-24 | End: 2023-07-27 | Stop reason: HOSPADM

## 2023-07-23 RX ADMIN — ONDANSETRON 4 MG: 2 INJECTION INTRAMUSCULAR; INTRAVENOUS at 17:10

## 2023-07-23 RX ADMIN — SODIUM CHLORIDE 1000 ML: 0.9 INJECTION, SOLUTION INTRAVENOUS at 18:22

## 2023-07-23 RX ADMIN — MAGNESIUM SULFATE HEPTAHYDRATE 2 G: 40 INJECTION, SOLUTION INTRAVENOUS at 18:44

## 2023-07-23 RX ADMIN — POTASSIUM CHLORIDE 40 MEQ: 1500 TABLET, EXTENDED RELEASE ORAL at 18:53

## 2023-07-23 RX ADMIN — SODIUM CHLORIDE 1000 ML: 0.9 INJECTION, SOLUTION INTRAVENOUS at 17:16

## 2023-07-23 RX ADMIN — ADENOSINE 6 MG: 3 INJECTION INTRAVENOUS at 16:48

## 2023-07-23 RX ADMIN — GABAPENTIN 400 MG: 400 CAPSULE ORAL at 22:34

## 2023-07-23 RX ADMIN — POTASSIUM CHLORIDE 20 MEQ: 14.9 INJECTION, SOLUTION INTRAVENOUS at 18:47

## 2023-07-23 RX ADMIN — ADENOSINE 6 MG: 3 INJECTION, SOLUTION INTRAVENOUS at 16:48

## 2023-07-23 NOTE — Clinical Note
Case was discussed with ROYAL and the patient's admission status was agreed to be Admission Status: inpatient status to the service of Dr. Spivey

## 2023-07-23 NOTE — LETTER
[address] [place] [address]  Dept: [phone]    July 27, 2023     Patient: Roby Carnes   YOB: 1960   Date of Visit: 7/23/2023       To Whom it May Concern:    Roby Carnes is under my professional care. She was seen in the hospital from 7/23/2023 to 07/27/23. She may return to work on 7/31/23    If you have any questions or concerns, please don't hesitate to call.          Sincerely,          Ghanshyam Springer, DO

## 2023-07-23 NOTE — ED PROVIDER NOTES
History  Chief Complaint   Patient presents with   • Palpitations     Pt was at [address] felt lightheaded and dizzy, hx of svt     Pt is a 65yo F who presents for headache. Patient reports that while checking out of the grocery store she began with a headache and visual disturbance. Patient reports that her vision was blurred. She also reports shortness of breath but denies any chest pain or palpitations. EMS was called and patient was found to be tachycardic. Patient reports history of SVT previously for which she was in the hospital approximately 2 months ago. States at that time she had palpitations but is not having palpitations currently. Patient denies any chest pain. Patient states prior to onset of this she was feeling well. States she has been going about her daily activities, eating and drinking normally, and taking all of her medications. Per chart review, patient was started on metoprolol when discharged from the hospital last and states she has been taking that regularly. Patient also has history of hypothyroidism for which she takes her medications regularly. 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 Patient does report that she has been getting "charley horses" at night recently which is abnormal for her. She also reports tingling in her hands and feet that has been consistent since her discharge from the hospital 2 months ago. Patient denies any recent trauma including fall or car accident. Prior to Admission Medications   Prescriptions Last Dose Informant Patient Reported? Taking?    FeroSul 325 (65 Fe) MG tablet   Yes No   Patient not taking: Reported on 7/3/2023   GaviLAX 17 GM/SCOOP powder   Yes No   Patient not taking: Reported on 7/3/2023   QUEtiapine (SEROquel) 25 mg tablet  Self Yes No   Sig: Take 25 mg by mouth daily at bedtime as needed (for sleep)   albuterol (Ventolin HFA) 90 mcg/act inhaler No No   Sig: Inhale 2 puffs every 6 (six) hours as needed for wheezing or shortness of breath   atorvastatin (LIPITOR) 40 mg tablet   No No   Sig: Take 1 tablet (40 mg total) by mouth daily with dinner   docusate sodium (COLACE) 100 mg capsule   No No   Sig: Take 1 capsule (100 mg total) by mouth 2 (two) times a day   Patient not taking: Reported on 7/3/2023   ferrous sulfate 324 (65 Fe) mg   No No   Sig: Take 1 tablet (324 mg total) by mouth daily before breakfast   fluticasone (FLONASE) 50 mcg/act nasal spray   No No   Si SPRAY INTO EACH NOSTRIL DAILY   gabapentin (NEURONTIN) 300 mg capsule  Self Yes No   Sig: Take 400 mg by mouth 2 (two) times a day   gabapentin (NEURONTIN) 400 mg capsule   Yes No   Sig: Take 400 mg by mouth 3 (three) times a day   Patient not taking: Reported on 7/3/2023   hydrOXYzine HCL (ATARAX) 25 mg tablet  Self Yes No   Sig: Take 25 mg by mouth 3 (three) times a day as needed   levothyroxine 88 mcg tablet   No No   Sig: Take 1 tablet (88 mcg total) by mouth daily in the early morning   loratadine (CLARITIN) 10 mg tablet   No No   Sig: Take 1 tablet (10 mg total) by mouth daily   metoprolol tartrate (LOPRESSOR) 25 mg tablet   No No   Sig: Take 0.5 tablets (12.5 mg total) by mouth every 12 (twelve) hours Hold for systolic blood pressure less than 110 mmHg and heart rate less than 50 beats per minute. nicotine (NICODERM CQ) 21 mg/24 hr TD 24 hr patch   No No   Sig: Place 1 patch on the skin over 24 hours daily Do not start before Leslie 3, 2023.    Patient not taking: Reported on 7/3/2023   polyethylene glycol (MIRALAX) 17 g packet   No No   Sig: Take 17 g by mouth daily as needed (constipation)   Patient not taking: Reported on 7/3/2023   vitamin B-12 (VITAMIN B-12) 1,000 mcg tablet   No No   Sig: Take 1 tablet (1,000 mcg total) by mouth daily   vitamin B-12 (VITAMIN B-12) 1,000 mcg tablet   Yes No   Patient not taking: Reported on 7/3/2023      Facility-Administered Medications: None Past Medical History:   Diagnosis Date   • Anxiety    • Depression    • Disease of thyroid gland    • Substance abuse [facility])        Past Surgical History:   Procedure Laterality Date   • BACK SURGERY  01/01/1973    scolosis rods   • ELBOW SURGERY  05/01/2018   • WISDOM TOOTH EXTRACTION         Family History   Problem Relation Age of Onset   • Colon cancer Mother    • Breast cancer Mother    • Cancer Mother    • Leukemia Father      I have reviewed and agree with the history as documented. E-Cigarette/Vaping     E-Cigarette/Vaping Substances     Social History     Tobacco Use   • Smoking status: Some Days     Packs/day: 1.00     Types: Cigarettes   • Smokeless tobacco: Never   Substance Use Topics   • Alcohol use: Not Currently     Comment: none 7/2018-went to rehab   • Drug use: Not Currently       Review of Systems   Eyes: Positive for visual disturbance (blurred). Respiratory: Positive for shortness of breath. Neurological: Positive for light-headedness and headaches. All other systems reviewed and are negative. Physical Exam  Physical Exam  Vitals reviewed. Constitutional:       Appearance: She is well-developed. She is ill-appearing. She is not diaphoretic. HENT:      Head: Normocephalic and atraumatic. Right Ear: External ear normal.      Left Ear: External ear normal.      Nose: Nose normal.      Mouth/Throat:      Pharynx: Oropharynx is clear. Eyes:      Extraocular Movements: Extraocular movements intact. Pupils: Pupils are equal, round, and reactive to light. Cardiovascular:      Rate and Rhythm: Regular rhythm. Tachycardia present. Pulses:           Radial pulses are 1+ on the right side and 1+ on the left side. Femoral pulses are 1+ on the right side and 1+ on the left side. Heart sounds: Normal heart sounds. Pulmonary:      Effort: Pulmonary effort is normal. No respiratory distress. Breath sounds: Normal breath sounds. No wheezing or rales. Abdominal:      General: There is no distension. Palpations: Abdomen is soft. Tenderness: There is no abdominal tenderness. Musculoskeletal:         General: Normal range of motion. Cervical back: Normal range of motion and neck supple. Right lower leg: No edema. Left lower leg: No edema. Skin:     General: Skin is warm and dry. Capillary Refill: Capillary refill takes less than 2 seconds. Coloration: Skin is not pale. Findings: No erythema or rash. Neurological:      General: No focal deficit present. Mental Status: She is alert and oriented to person, place, and time. Psychiatric:         Speech: Speech normal.         Behavior: Behavior is cooperative.          Vital Signs  ED Triage Vitals   Temperature Pulse Respirations Blood Pressure SpO2   07/23/23 1712 07/23/23 1648 07/23/23 1712 07/23/23 1648 07/23/23 1648   97.6 °F (36.4 °C) (!) 198 20 (!) 73/33 94 %      Temp Source Heart Rate Source Patient Position - Orthostatic VS BP Location FiO2 (%)   07/23/23 1712 07/23/23 1648 07/23/23 1648 07/23/23 1648 --   Oral Monitor Sitting Left arm       Pain Score       07/23/23 1713       8           Vitals:    07/23/23 1830 07/23/23 1900 07/23/23 1930 07/23/23 1945   BP: 91/54 95/57 100/56 98/56   Pulse: 78 75 73 74   Patient Position - Orthostatic VS:             Visual Acuity      ED Medications  Medications   sodium chloride 0.9 % bolus 1,000 mL (1,000 mL Intravenous New Bag 7/23/23 1822)   potassium chloride 20 mEq IVPB (premix) (20 mEq Intravenous New Bag 7/23/23 1847)   magnesium sulfate 2 g/50 mL IVPB (premix) 2 g (2 g Intravenous New Bag 7/23/23 1844)   adenosine (ADENOCARD) injection 6 mg (6 mg Intravenous Given 7/23/23 1648)   ondansetron (ZOFRAN) injection 4 mg (4 mg Intravenous Given 7/23/23 1710)   sodium chloride 0.9 % bolus 1,000 mL (0 mL Intravenous Stopped 7/23/23 1822)   potassium chloride (K-DUR,KLOR-CON) CR tablet 40 mEq (40 mEq Oral Given 7/23/23 1853)       Diagnostic Studies  Results Reviewed     Procedure Component Value Units Date/Time    HS Troponin I 2hr [[id number]] Collected: 07/23/23 1941    Lab Status: In process Specimen: Blood from Arm, Right Updated: 07/23/23 1944    HS Troponin I 4hr [[id number]]     Lab Status: No result Specimen: Blood     TSH, 3rd generation with Free T4 reflex [[id number]]  (Abnormal) Collected: 07/23/23 1753    Lab Status: Final result Specimen: Blood from Arm, Right Updated: 07/23/23 1846     TSH 3RD GENERATON 24.901 uIU/mL     T4, free [[id number]] Collected: 07/23/23 1753    Lab Status:  In process Specimen: Blood from Arm, Right Updated: 07/23/23 1846    Comprehensive metabolic panel [[id number]]  (Abnormal) Collected: 07/23/23 1753    Lab Status: Final result Specimen: Blood from Arm, Right Updated: 07/23/23 1817     Sodium 136 mmol/L      Potassium 3.2 mmol/L      Chloride 104 mmol/L      CO2 24 mmol/L      ANION GAP 8 mmol/L      BUN 10 mg/dL      Creatinine 0.68 mg/dL      Glucose 92 mg/dL      Calcium 7.7 mg/dL      Corrected Calcium 8.3 mg/dL      AST 14 U/L      ALT 9 U/L      Alkaline Phosphatase 38 U/L      Total Protein 5.4 g/dL      Albumin 3.3 g/dL      Total Bilirubin 0.33 mg/dL      eGFR 95 ml/min/1.73sq m     Narrative:      [facility] guidelines for Chronic Kidney Disease (CKD):   •  Stage 1 with normal or high GFR (GFR > 90 mL/min/1.73 square meters)  •  Stage 2 Mild CKD (GFR = 60-89 mL/min/1.73 square meters)  •  Stage 3A Moderate CKD (GFR = 45-59 mL/min/1.73 square meters)  •  Stage 3B Moderate CKD (GFR = 30-44 mL/min/1.73 square meters)  •  Stage 4 Severe CKD (GFR = 15-29 mL/min/1.73 square meters)  •  Stage 5 End Stage CKD (GFR <15 mL/min/1.73 square meters)  Note: GFR calculation is accurate only with a steady state creatinine    Magnesium [[id number]]  (Abnormal) Collected: 07/23/23 1753    Lab Status: Final result Specimen: Blood from Arm, Right Updated: 07/23/23 2109 Magnesium 1.6 mg/dL     Phosphorus [[id number]]  (Normal) Collected: 07/23/23 1753    Lab Status: Final result Specimen: Blood from Arm, Right Updated: 07/23/23 1817     Phosphorus 3.6 mg/dL     HS Troponin 0hr (reflex protocol) [[id number]]  (Normal) Collected: 07/23/23 1721    Lab Status: Final result Specimen: Blood from Arm, Right Updated: 07/23/23 1745     hs TnI 0hr <2 ng/L     CBC and differential [[id number]]  (Abnormal) Collected: 07/23/23 1710    Lab Status: Final result Specimen: Blood from Arm, Right Updated: 07/23/23 1715     WBC 10.74 Thousand/uL      RBC 4.86 Million/uL      Hemoglobin 14.3 g/dL      Hematocrit 44.6 %      MCV 92 fL      MCH 29.4 pg      MCHC 32.1 g/dL      RDW 20.2 %      MPV 9.6 fL      Platelets 220 Thousands/uL      nRBC 0 /100 WBCs      Neutrophils Relative 62 %      Immat GRANS % 0 %      Lymphocytes Relative 28 %      Monocytes Relative 6 %      Eosinophils Relative 3 %      Basophils Relative 1 %      Neutrophils Absolute 6.67 Thousands/µL      Immature Grans Absolute 0.03 Thousand/uL      Lymphocytes Absolute 2.98 Thousands/µL      Monocytes Absolute 0.66 Thousand/µL      Eosinophils Absolute 0.28 Thousand/µL      Basophils Absolute 0.12 Thousands/µL                  No orders to display              Procedures  Procedures         ED Course  ED Course as of 07/23/23 2010   Sun Jul 23, 2023   1716 Hemoglobin: 14.3  Significantly improved from prior. 1716 WBC(!): 10.74  Mildly elevated. 1716 CBC and differential(!)  Reviewed and without actionable derangement. 1721 Blood Pressure(!): 79/53  Fluids in process. 1723 Pulse(!): 198   1730 Pt meeting SIRS criteria due to tachycardia and tachypnea. VS 2/2 arrhythmia and there is no suspicion for infection at this time. Will not proceed with sepsis w/u.    1734 MAP improved to 66 following approximately 800cc fluids.     [address] pt who reports that her headache is gone and her vision has returned to normal. Pt overall feeling significantly better. BP still borderline 92/51. Discussed with pt plan for admission following lab results and pt agreeable. 1750 hs TnI 0hr: <2  Negative. 1800 SpO2(!): 87 %  On RA. Placed back on 2L NC with immediate improvement. Pt without SOB or respiratory complaints. 1826 Potassium(!): 3.2  Will replete. 1827 Magnesium(!): 1.6  Will replete. 1827 Phosphorus: 3.6  WNL. [facility] contacted for admission. 1852 TSH 3RD [facility] Rend(!): 24.901  Elevated but less elevated than previously. T4 in process. Medical Decision Making  Pt is a 65yo F who presents with headache. Exam pertinent for tachycardia in ill appearing pt. Patient initially with heart rate in the 190s. EKG showed SVT. Patient awake and talking but with hypotension. Patient previously converted with adenosine and gave verbal consent for adenosine. 6 mg of adenosine given and patient converted back to normal sinus rhythm with a heart rate in the 80s. Patient's blood pressure minimally improved. 1 L bolus initiated. Repeat EKG not meeting criteria for STEMI but does show mild elevation in aVR. Patient without chest pain, however due to abnormal morphology of aVR will get troponin. We will also obtain basic labs including electrolytes. See ED course for results and details. Plan to admit pt to [facility]. Pt discussed with admitting team and admission orders placed. Pt admitted without incident. Amount and/or Complexity of Data Reviewed  Labs: ordered. Decision-making details documented in ED Course. Risk  Prescription drug management. Decision regarding hospitalization.           Disposition  Final diagnoses:   SVT (supraventricular tachycardia) (HCC)   Hypotension   Elevated TSH   Headache   Hypomagnesemia   Hypokalemia     Time reflects when diagnosis was documented in both MDM as applicable and the Disposition within this note     Time User Action Codes Description Comment    7/23/2023  7:47 PM Delta Shady Add [I47.1] SVT (supraventricular tachycardia) ([facility])     7/23/2023  7:56 PM Ruba Sox Modify [I47.1] SVT (supraventricular tachycardia) ([facility])     7/23/2023  7:56 PM Ruba Sox Add [I95.9] Hypotension     7/23/2023  7:56 PM Ruba Sox Add [R79.89] Elevated TSH     7/23/2023  7:56 PM Ruba Sox Add [R51.9] Headache     7/23/2023  7:56 PM Ruba Sox Add [E83.42] Hypomagnesemia     7/23/2023  7:56 PM Ruba Sox Add [E87.6] Hypokalemia       ED Disposition     ED Disposition   Admit    Condition   Stable    Date/Time   Sun Jul 23, 2023  8:06 PM    Comment   Case was discussed with ROYAL and the patient's admission status was agreed to be Admission Status: inpatient status to the service of Dr. Jason Scott. Follow-up Information    None         Patient's Medications   Discharge Prescriptions    No medications on file       No discharge procedures on file.     PDMP Review     None          ED Provider  Electronically Signed by           Trisha Sauceda MD  07/23/23 2010

## 2023-07-23 NOTE — ED NOTES
Pt arrives BLS reported that she was shopping at [facility] and co of headache and blurry vision. On arrival HR in 190s, BP 70/44 manual.  no heart palpation no CP. Co of headache, nausea and blurry vision. Pt given adenosin and fluid, converted to sinus rhythm, blurriness improved. BP 82/52. Continue to monitor.       Naseem Adrian RN  07/23/23 [id number]

## 2023-07-24 ENCOUNTER — APPOINTMENT (INPATIENT)
Dept: RADIOLOGY | Facility: HOSPITAL | Age: 63
DRG: 286 | End: 2023-07-24
Payer: COMMERCIAL

## 2023-07-24 ENCOUNTER — PREP FOR PROCEDURE (OUTPATIENT)
Dept: CARDIOLOGY CLINIC | Facility: CLINIC | Age: 63
End: 2023-07-24

## 2023-07-24 DIAGNOSIS — I47.1 SVT (SUPRAVENTRICULAR TACHYCARDIA) (HCC): Primary | ICD-10-CM

## 2023-07-24 LAB
ALBUMIN SERPL BCP-MCNC: 3.6 G/DL (ref 3.5–5)
ALP SERPL-CCNC: 42 U/L (ref 34–104)
ALT SERPL W P-5'-P-CCNC: 8 U/L (ref 7–52)
ANION GAP SERPL CALCULATED.3IONS-SCNC: 2 MMOL/L
AST SERPL W P-5'-P-CCNC: 13 U/L (ref 13–39)
ATRIAL RATE: 79 BPM
ATRIAL RATE: 86 BPM
BILIRUB SERPL-MCNC: 0.35 MG/DL (ref 0.2–1)
BUN SERPL-MCNC: 8 MG/DL (ref 5–25)
CALCIUM SERPL-MCNC: 8.5 MG/DL (ref 8.4–10.2)
CHLORIDE SERPL-SCNC: 107 MMOL/L (ref 96–108)
CO2 SERPL-SCNC: 30 MMOL/L (ref 21–32)
CREAT SERPL-MCNC: 0.72 MG/DL (ref 0.6–1.3)
ERYTHROCYTE [DISTWIDTH] IN BLOOD BY AUTOMATED COUNT: 20.5 % (ref 11.6–15.1)
GFR SERPL CREATININE-BSD FRML MDRD: 91 ML/MIN/1.73SQ M
GLUCOSE SERPL-MCNC: 72 MG/DL (ref 65–140)
HCT VFR BLD AUTO: 40.3 % (ref 34.8–46.1)
HGB BLD-MCNC: 12.6 G/DL (ref 11.5–15.4)
MAGNESIUM SERPL-MCNC: 2.5 MG/DL (ref 1.9–2.7)
MCH RBC QN AUTO: 29.4 PG (ref 26.8–34.3)
MCHC RBC AUTO-ENTMCNC: 31.3 G/DL (ref 31.4–37.4)
MCV RBC AUTO: 94 FL (ref 82–98)
P AXIS: 76 DEGREES
P AXIS: 79 DEGREES
PLATELET # BLD AUTO: 271 THOUSANDS/UL (ref 149–390)
PMV BLD AUTO: 9.3 FL (ref 8.9–12.7)
POTASSIUM SERPL-SCNC: 5 MMOL/L (ref 3.5–5.3)
PR INTERVAL: 132 MS
PR INTERVAL: 134 MS
PROT SERPL-MCNC: 6 G/DL (ref 6.4–8.4)
QRS AXIS: 105 DEGREES
QRS AXIS: 77 DEGREES
QRS AXIS: 78 DEGREES
QRSD INTERVAL: 186 MS
QRSD INTERVAL: 80 MS
QRSD INTERVAL: 82 MS
QT INTERVAL: 250 MS
QT INTERVAL: 360 MS
QT INTERVAL: 378 MS
QTC INTERVAL: 412 MS
QTC INTERVAL: 450 MS
QTC INTERVAL: 452 MS
RBC # BLD AUTO: 4.29 MILLION/UL (ref 3.81–5.12)
SODIUM SERPL-SCNC: 139 MMOL/L (ref 135–147)
T WAVE AXIS: 72 DEGREES
T WAVE AXIS: 79 DEGREES
T WAVE AXIS: 80 DEGREES
T4 FREE SERPL-MCNC: 1.13 NG/DL (ref 0.61–1.12)
VENTRICULAR RATE: 195 BPM
VENTRICULAR RATE: 79 BPM
VENTRICULAR RATE: 86 BPM
WBC # BLD AUTO: 7.19 THOUSAND/UL (ref 4.31–10.16)

## 2023-07-24 PROCEDURE — 99223 1ST HOSP IP/OBS HIGH 75: CPT | Performed by: FAMILY MEDICINE

## 2023-07-24 PROCEDURE — 80053 COMPREHEN METABOLIC PANEL: CPT | Performed by: STUDENT IN AN ORGANIZED HEALTH CARE EDUCATION/TRAINING PROGRAM

## 2023-07-24 PROCEDURE — 71046 X-RAY EXAM CHEST 2 VIEWS: CPT

## 2023-07-24 PROCEDURE — NC001 PR NO CHARGE: Performed by: FAMILY MEDICINE

## 2023-07-24 PROCEDURE — B2111ZZ FLUOROSCOPY OF MULTIPLE CORONARY ARTERIES USING LOW OSMOLAR CONTRAST: ICD-10-PCS | Performed by: INTERNAL MEDICINE

## 2023-07-24 PROCEDURE — 83735 ASSAY OF MAGNESIUM: CPT | Performed by: STUDENT IN AN ORGANIZED HEALTH CARE EDUCATION/TRAINING PROGRAM

## 2023-07-24 PROCEDURE — 93010 ELECTROCARDIOGRAM REPORT: CPT | Performed by: INTERNAL MEDICINE

## 2023-07-24 PROCEDURE — 99223 1ST HOSP IP/OBS HIGH 75: CPT | Performed by: INTERNAL MEDICINE

## 2023-07-24 PROCEDURE — 4A023N7 MEASUREMENT OF CARDIAC SAMPLING AND PRESSURE, LEFT HEART, PERCUTANEOUS APPROACH: ICD-10-PCS | Performed by: INTERNAL MEDICINE

## 2023-07-24 PROCEDURE — 97163 PT EVAL HIGH COMPLEX 45 MIN: CPT

## 2023-07-24 PROCEDURE — 85027 COMPLETE CBC AUTOMATED: CPT | Performed by: STUDENT IN AN ORGANIZED HEALTH CARE EDUCATION/TRAINING PROGRAM

## 2023-07-24 RX ORDER — POTASSIUM CHLORIDE 20 MEQ/1
20 TABLET, EXTENDED RELEASE ORAL ONCE
Status: COMPLETED | OUTPATIENT
Start: 2023-07-24 | End: 2023-07-24

## 2023-07-24 RX ORDER — ACETAMINOPHEN 325 MG/1
650 TABLET ORAL EVERY 6 HOURS PRN
Status: DISCONTINUED | OUTPATIENT
Start: 2023-07-24 | End: 2023-07-27 | Stop reason: HOSPADM

## 2023-07-24 RX ADMIN — DOCUSATE SODIUM 100 MG: 100 CAPSULE, LIQUID FILLED ORAL at 17:14

## 2023-07-24 RX ADMIN — FERROUS SULFATE TAB 325 MG (65 MG ELEMENTAL FE) 325 MG: 325 (65 FE) TAB at 08:44

## 2023-07-24 RX ADMIN — GABAPENTIN 400 MG: 400 CAPSULE ORAL at 17:15

## 2023-07-24 RX ADMIN — DOCUSATE SODIUM 100 MG: 100 CAPSULE, LIQUID FILLED ORAL at 08:44

## 2023-07-24 RX ADMIN — LORATADINE 10 MG: 10 TABLET ORAL at 08:44

## 2023-07-24 RX ADMIN — POTASSIUM CHLORIDE 20 MEQ: 1500 TABLET, EXTENDED RELEASE ORAL at 08:44

## 2023-07-24 RX ADMIN — NICOTINE 21 MG: 21 PATCH, EXTENDED RELEASE TRANSDERMAL at 08:44

## 2023-07-24 RX ADMIN — ACETAMINOPHEN 650 MG: 325 TABLET ORAL at 10:19

## 2023-07-24 RX ADMIN — CYANOCOBALAMIN TAB 500 MCG 1000 MCG: 500 TAB at 08:44

## 2023-07-24 RX ADMIN — QUETIAPINE FUMARATE 25 MG: 25 TABLET ORAL at 22:25

## 2023-07-24 RX ADMIN — FLUTICASONE PROPIONATE 1 SPRAY: 50 SPRAY, METERED NASAL at 11:06

## 2023-07-24 RX ADMIN — LEVOTHYROXINE SODIUM 100 MCG: 100 TABLET ORAL at 05:27

## 2023-07-24 RX ADMIN — ATORVASTATIN CALCIUM 40 MG: 40 TABLET, FILM COATED ORAL at 17:14

## 2023-07-24 RX ADMIN — GABAPENTIN 400 MG: 400 CAPSULE ORAL at 08:44

## 2023-07-24 NOTE — ASSESSMENT & PLAN NOTE
During admission patient was placed on 2 L/min of O2 per NC with saturations ranging from %. Home medication regimen includes albuterol 90 mcg/act inhaler, 2 puffs, Inhalation, Q6H PRN at home. Overt the course of admission patient had increased oxygen requirement and positive home O2/desaturation test.  Patient was negative for significant AHI is overnight. CT of chest wo contrast on 7/26 observed scarring in the posterior right lower lobe and some bronchial branch narrowing as well as trace effusions. Patient was also positive for atelectasis and mucous debris.     · Continue oxygen supplementation as needed  · Patient given albuterol scheduled every 6 hours  · Monitor SpO2  · Pulmonology consulted  · Patient advised to receive repeat CT scan in 6 weeks outpatient  · Patient advised to start Incruse daily as well as steroid with plan to wean on discharge
Hgb 14.3 on admission. Anemia has resolved. Currently on Ferrous sulfate 325 mg, PO, QD. No complaints of constipation.      · Continue iron supplementation   · Transition to every other day for 6 more months
Intermittent    Mag 1.6 on admission increased appropriately after 2 g of magnesium was given to 2.5 however on day after 2.5 patient is now decreased to 1.7.    · Monitor Magnesium levels  · Maintain Mag at 2 or above  · Magnesium levels at AM
Lab Results   Component Value Date    [id number] 24.901 (H) 07/23/2023     Pt currently on Levothyroxine 88 mcg, PO, QD. Patient reports good compliance with medications. TSH has improved from prior on 6/1/23 that was 143.129, but continues to be elevated.      · Increase dosage of Levothyroxine from 88 mcg to 100 mcg, PO, QD   · Repeat TSH in 6 weeks
Pt is current smoker. Refers smoking 1 PPD.      · Nicotine patch 21 mg, 1 patch, transdermal, QD   · Tobacco cessation education
Pt presenting on arrival with HR of 198 bpm and hypotensive. Given 6 mg of Adenosine, IV, once in the ED. Converted to NSR. Echo from 6/1/23 showed EF of 55% and no HF. Currently on Metoprolol 12.5 mg, PO, Q12H at home. Refers compliance with medication. Prior episode was in May 2023 for which she was admitted to [address]. · Continuous cardiac monitoring (telemetry)   · Cardiology consult  · Cardiac catheterization on 7/25 showed Mid RCA lesion is 40% stenosed and mild calcification of proximal and mid RCA.   · EF of 60%  · Will need outpatient EP, has appointment on 8/30  · Continue Metoprolol 12.5 mg, PO, Q12H but hold for SBP < 110 mmHg  · Cardiac diet, low fat, low cholesterol, no caffeine  · Avoid stimulants  · Replete electrolytes   · Can try non pharmacological measures if recurrent SVT like Valsalva maneuver, cold water to the face and ice pack application   · If persistent, consult ICU nurse/rapid response for adenosine administration
Pt's BP on arrival was 73/33 mmHg with HR of 198.  Pt was given two 1 L boluses of NS.     · Monitor BP   · Patient restarted on home metoprolol 12.5
Resolving as evidenced by normalizing potassium. Patient was given 40 mEq of KDUR and 20 mEq of IV KCL with initial potassium of 3.2.     · Repeat and replenish as necessary  · Maintain K at 4 or above  · BMP daily
Stable. Pt refers not feeling depressed. Mentions occasional episodes of anxiety. On Hydroxyzine 25 mg, PO, TID PRN and Quetiapine 25 mg, PO, QD HS PRN.      · Continue home medications
No

## 2023-07-24 NOTE — CONSULTS
Consultation - Cardiology   [facility]     Enmanuel Nicholnt 63 y.o. female MRN: [id number]  : 1960  Unit/Bed#: [address] [place] [address] Encounter: [id number]      Assessment & Plan   1. Supraventricular tachycardia.      - Presented on 23 for evaluation for headache, lightheadedness, dizziness, and facial flushness. On presentation, patient markedly hypotensive (lowest BP 73/33) noted to be in SVT in ED.   - 23 1645 hrs EKG: Supraventricular tachycardia, rate 195 bpm.    - Patient was given 1 dose of adenosine 6 mg IV and converted to sinus rhythm. - Repeat EKG 23 1658 hrs EKG: Sinus rhythm, 79 bpm; noted PVCs and PACs. - Continue Lopressor 12.5 mg twice daily.   - Monitor electrolytes. Replete potassium above 4 and magnesium above 2.    - 23 TTE: LVEF 55%. Systolic function normal.  Wall motion is normal.  Diastolic function normal.  Right ventricular systolic function normal.  Bilateral atria normal in size. - 23 nuclear stress test: Equivocal study. Small apical defect possible artifact. No major reversible perfusion defects noted with stress. Lower suspicion for balanced ischemia with TID of 1.1 and EF 77%. - No further Cardiology workup at this time.   - Recommend patient follow up with EP Cardiology within 10-14 days of discharge. Appointment request sent for patient to follow-up with Dr. Eddy Reich, at [facility] in [place].  - Discussed with patient recommendations to decrease caffeine intake.     2. Elevated troponin.      - 23 hs troponin: <2 (0hrs), 7 (2hrs), 81 (4hrs). - Likely non ischemic myocardial injury secondary to SVT and hypotension on presentation.   - Patient denies chest pain. - 23 nuclear stress test: Equivocal study. Small apical defect possible artifact. No major reversible perfusion defects noted with stress. Lower suspicion for balanced ischemia with TID of 1.1 and EF 77%    3.  Hypothyroidism.      - 7/23/23 TSH: 24.901.   - 7/23/23 Free T4: 1.13.   - Currently on levothyroxine 100 mcg daily. - Care per primary team.      4. Hyperlipidemia.      - 6/01/23 lipid panel: cholesterol 256, triglycerides 88, HDL 71, .   - Continue Lipitor 40 mg daily.     5. COPD.    - Does not appear in acute exacerbation.   - Not on supplemental oxygen. - Care per primary team.      6. Depression/anxiety.      - Currently on Seroquel 25 mg daily at bedtime prn and atarax 25 mg three times daily prn.    - Care per primary team.     Summary of Recommendations: Thank you for your consultation. Physician Requesting Consult: Bin Rogel MD    Reason for Consult / Principal Problem: SVT. Inpatient consult to Cardiology  Consult performed by: Diogo Caban PA-C  Consult ordered by: Collette Patricia MD          HPI: Rick Naqvi is a 63y.o. year old female , current smoker (reports approximately one pack per day), with PMHx of SVT, COPD (not on supplemental O2), ETOH abuse (sober 5 years), and depression/anxiety who presented on 7/23/23 for evaluation for headache, lightheadedness, dizziness, and facial flushness. Patient reports that she was grocery shopping when she suddenly felt a flush and extreme headache. She states headache was associated lightheadedness and dizziness. She denies experiencing chest pain, palpitations, shortness of breath, nausea, or vomiting. On presentation, patient markedly hypotensive (lowest BP 73/33) noted to be in SVT in ED. 7/23/23 1645 hrs EKG: Supraventricular tachycardia, rate 195 bpm.  Patient was given 1 dose of adenosine 6 mg IV and converted to sinus rhythm. Repeat EKG 7/23/23 1658 hrs EKG: Sinus rhythm, 79 bpm; noted PVCs and PACs. Of note, patient was hospitalized in June of 2023 for SVT. Patient reports she has been compliant with her medications. Review of Systems   Constitutional: Positive for fatigue.  Negative for activity change, chills, diaphoresis and unexpected weight change. Respiratory: Negative for cough, chest tightness, shortness of breath and wheezing. Cardiovascular: Negative for chest pain, palpitations and leg swelling. Gastrointestinal: Negative for abdominal distention, abdominal pain, constipation, diarrhea, nausea and vomiting. Skin: Negative. Neurological: Positive for dizziness, weakness, light-headedness and headaches. Negative for syncope and numbness.        Historical Information   Past Medical History:   Diagnosis Date   • Anxiety    • Depression    • Disease of thyroid gland    • Elevated troponin 5/31/2023   • History of alcohol abuse 5/31/2023   • Substance abuse [facility])      Past Surgical History:   Procedure Laterality Date   • BACK SURGERY  01/01/1973    scolosis rods   • ELBOW SURGERY  05/01/2018   • WISDOM TOOTH EXTRACTION       Social History     Substance and Sexual Activity   Alcohol Use Not Currently    Comment: none 7/2018-went to rehab     Social History     Substance and Sexual Activity   Drug Use Not Currently     Social History     Tobacco Use   Smoking Status Some Days   • Packs/day: 1.00   • Types: Cigarettes   Smokeless Tobacco Never     Family History:   Family History   Problem Relation Age of Onset   • Colon cancer Mother    • Breast cancer Mother    • Cancer Mother    • Leukemia Father        Meds/Allergies    PTA meds:    Medications Prior to Admission   Medication   • albuterol (Ventolin HFA) 90 mcg/act inhaler   • atorvastatin (LIPITOR) 40 mg tablet   • docusate sodium (COLACE) 100 mg capsule   • ferrous sulfate 324 (65 Fe) mg   • fluticasone (FLONASE) 50 mcg/act nasal spray   • gabapentin (NEURONTIN) 300 mg capsule   • hydrOXYzine HCL (ATARAX) 25 mg tablet   • levothyroxine 88 mcg tablet   • loratadine (CLARITIN) 10 mg tablet   • metoprolol tartrate (LOPRESSOR) 25 mg tablet   • multivitamin (THERAGRAN) TABS   • vitamin B-12 (VITAMIN B-12) 1,000 mcg tablet   • FeroSul 325 (65 Fe) MG tablet   • gabapentin (NEURONTIN) 400 mg capsule   • GaviLAX 17 GM/SCOOP powder   • nicotine (NICODERM CQ) 21 mg/24 hr TD 24 hr patch   • polyethylene glycol (MIRALAX) 17 g packet   • QUEtiapine (SEROquel) 25 mg tablet   • vitamin B-12 (VITAMIN B-12) 1,000 mcg tablet      Allergies   Allergen Reactions   • Other Allergic Rhinitis     seasonal       Current Facility-Administered Medications:   •  acetaminophen (TYLENOL) tablet 650 mg, 650 mg, Oral, Q6H PRN, Carlotta Dumont MD  •  atorvastatin (LIPITOR) tablet 40 mg, 40 mg, Oral, Daily With Elmer Garcia MD  •  calcium carbonate (TUMS) chewable tablet 1,000 mg, 1,000 mg, Oral, Daily PRN, Carlotta Dumont MD  •  cyanocobalamin (VITAMIN B-12) tablet 1,000 mcg, 1,000 mcg, Oral, Daily, Carlotta Dumont MD  •  docusate sodium (COLACE) capsule 100 mg, 100 mg, Oral, BID, Carlotta Dumont MD  •  ferrous sulfate tablet 325 mg, 325 mg, Oral, Every Other Day, Carlotta Dumont MD  •  fluticasone (FLONASE) 50 mcg/act nasal spray 1 spray, 1 spray, Nasal, Daily, Carlotta Dumont MD  •  gabapentin (NEURONTIN) capsule 400 mg, 400 mg, Oral, BID, Carlotta Dumont MD, 400 mg at 07/23/23 3794  •  hydrOXYzine HCL (ATARAX) tablet 25 mg, 25 mg, Oral, TID PRN, Carlotta Dumont MD  •  levothyroxine tablet 100 mcg, 100 mcg, Oral, Early Morning, Sonja Parisi MD, 100 mcg at 07/24/23 0016  •  loratadine (CLARITIN) tablet 10 mg, 10 mg, Oral, Daily, Carlotta Dumont MD  •  metoprolol tartrate (LOPRESSOR) partial tablet 12.5 mg, 12.5 mg, Oral, Q12H [address], Carlotta Dumont MD  •  nicotine (NICODERM CQ) 21 mg/24 hr TD 24 hr patch 21 mg, 21 mg, Transdermal, Daily, Carlotta Dumont MD  •  ondansetron [facility]) injection 4 mg, 4 mg, Intravenous, Q6H PRN, Carlotta Dumont MD  •  potassium chloride (K-DUR,KLOR-CON) CR tablet 20 mEq, 20 mEq, Oral, Once, Carlotta Dumont MD  •  QUEtiapine (SEROquel) tablet 25 mg, 25 mg, Oral, HS PRN, Ayana Balderas MD    VTE Pharmacologic Prophylaxis: none. Objective:   Vitals: Blood pressure 94/65, pulse 78, temperature 98 °F (36.7 °C), resp. rate 19, height 5' (1.524 m), weight 50.7 kg (111 lb 12.8 oz), SpO2 96 %, not currently breastfeeding. Body mass index is 21.83 kg/m². Wt Readings from Last 3 Encounters:   07/24/23 50.7 kg (111 lb 12.8 oz)   07/03/23 49.1 kg (108 lb 3 oz)   06/13/23 53.1 kg (117 lb)     BP Readings from Last 3 Encounters:   07/24/23 94/65   07/03/23 124/72   06/13/23 115/55     Orthostatic Blood Pressures    Flowsheet Row Most Recent Value   Blood Pressure 94/65 filed at 07/24/2023 0220   Patient Position - Orthostatic VS Sitting filed at 07/23/2023 1648          Intake/Output Summary (Last 24 hours) at 7/24/2023 0841  Last data filed at 7/23/2023 2316  Gross per 24 hour   Intake 2050 ml   Output 600 ml   Net 1450 ml       Invasive Devices     Peripheral Intravenous Line  Duration           Peripheral IV 07/23/23 Distal;Right;Upper;Ventral (anterior) Arm <1 day    Peripheral IV 07/23/23 Right;Ventral (anterior) Forearm <1 day              Physical Exam:   Physical Exam  Vitals reviewed. Constitutional:       General: She is not in acute distress. Appearance: She is not ill-appearing. Cardiovascular:      Rate and Rhythm: Normal rate and regular rhythm. Pulses: Normal pulses. Heart sounds: Murmur heard. Pulmonary:      Effort: Pulmonary effort is normal. No respiratory distress. Breath sounds: Normal breath sounds. Abdominal:      General: Abdomen is flat. There is no distension. Palpations: Abdomen is soft. Tenderness: There is no abdominal tenderness. Musculoskeletal:      Right lower leg: No edema. Left lower leg: No edema. Skin:     General: Skin is warm and dry. Coloration: Skin is pale. Neurological:      Mental Status: She is alert and oriented to person, place, and time.        Labs:   Troponins:  Results from last 7 days   Lab Units 07/23/23 2203 07/23/23  1941   HSTNI D2 ng/L  --  >5   HSTNI D4 ng/L >79*  --        CBC with diff:   Results from last 7 days   Lab Units 07/24/23 0531 07/23/23 2203 07/23/23  1710   WBC Thousand/uL 7.19  --  10.74*   HEMOGLOBIN g/dL 12.6  --  14.3   HEMATOCRIT % 40.3  --  44.6   MCV fL 94  --  92   PLATELETS Thousands/uL 271 268 341   RBC Million/uL 4.29  --  4.86   MCH pg 29.4  --  29.4   MCHC g/dL 31.3*  --  32.1   RDW % 20.5*  --  20.2*   MPV fL 9.3 9.3 9.6   NRBC AUTO /100 WBCs  --   --  0       CMP:   Results from last 7 days   Lab Units 07/24/23  0531 07/23/23  1753   SODIUM mmol/L 139 136   POTASSIUM mmol/L 5.0 3.2*   CHLORIDE mmol/L 107 104   CO2 mmol/L 30 24   ANION GAP mmol/L 2 8   BUN mg/dL 8 10   CREATININE mg/dL 0.72 0.68   CALCIUM mg/dL 8.5 7.7*   AST U/L 13 14   ALT U/L 8 9   ALK PHOS U/L 42 38   TOTAL PROTEIN g/dL 6.0* 5.4*   ALBUMIN g/dL 3.6 3.3*   TOTAL BILIRUBIN mg/dL 0.35 0.33   EGFR ml/min/1.73sq m 91 95   GLUCOSE RANDOM mg/dL 72 92       Magnesium:   Results from last 7 days   Lab Units 07/24/23  0531 07/23/23  1753   MAGNESIUM mg/dL 2.5 1.6*     Coags:   Results from last 7 days   Lab Units 07/23/23 2203   PTT seconds 31   INR  1.06     TSH:    Results from last 7 days   Lab Units 07/23/23  1753   TSH 3RD GENERATON uIU/mL 24.901*     No components found for: "TSH3"  Lipid Profile:     Lipid Profile:   Lab Results   Component Value Date    CHOLESTEROL 256 (H) 06/01/2023    HDL 71 06/01/2023    LDLCALC 167 (H) 06/01/2023    TRIG 88 06/01/2023     Hgb A1c:     NT-proBNP: No results for input(s): "NTBNP" in the last 72 hours. Imaging & Testing     Cardiac testing:     Echo complete    Results date: 6/01/23    Left Ventricle Left ventricular cavity size is normal. Wall thickness is normal. The left ventricular ejection fraction is 55% by visual estimation.  Systolic function is normal.  Wall motion is normal. Diastolic function is normal.      Right Ventricle Right ventricular cavity size is normal. Systolic function is normal. Normal tricuspid annular plane systolic excursion (TAPSE) > 1.7 cm. Wall thickness is normal.      Left Atrium The atrium is normal in size. Right Atrium The atrium is normal in size. Aortic Valve The aortic valve is trileaflet. The leaflets are mildly thickened. The leaflets are not calcified. The leaflets exhibit normal mobility. There is no evidence of regurgitation. The aortic valve has no significant stenosis. Mitral Valve There is mild thickening. There is mild annular calcification. There is trace regurgitation. There is no evidence of stenosis. Tricuspid Valve Tricuspid valve structure is normal. There is trace regurgitation. There is no evidence of stenosis. Pulmonic Valve Pulmonic valve structure is normal. There is no evidence of regurgitation. There is no evidence of stenosis. Ascending Aorta The aortic root is normal in size. IVC/SVC The inferior vena cava is normal in size. Pericardium There is no pericardial effusion. The pericardium is normal in appearance. Nuclear stress test    Results date: 6/02/23    •  Stress Function: Left ventricular function post-stress is normal. Stress ejection fraction is 77 %. •  Perfusion: There is a left ventricular perfusion defect that is small in size present in the apex location(s) that is predominantly fixed. •  Stress Combined Conclusion: Left ventricular perfusion is equivocal.  •  Perfusion Defect Conclusion: The stress/rest perfusion ratio is 1.10 . There is no evidence of transient ischemic dilation (TID). •  Stress ECG: The stress ECG is equivocal for ischemia after vasodilation and low level exercise.     Equivocal study. Small apical defect possible artifact. No major reversible perfusion defects noted with stress. Lower suspicion for balanced ischemia with TID of 1.1 and EF 77%         Imaging: I have personally reviewed pertinent reports. No results found. EKG/ Monitor: Personally reviewed. Telemetry reviewed: Currently sinus rhythm, 84 bpm.  No tachycardic events noted. 7/23/23 1645 hrs EKG: Supraventricular tachycardia, rate 195 bpm.    7/23/23 1658 hrs EKG: Sinus rhythm, 79 bpm.  Noted PVCs and PACs.      Code Status: Level 1 - Full Code  Advance Directive and Living Will:      POLST:        Maci Duran PA-C

## 2023-07-24 NOTE — OCCUPATIONAL THERAPY NOTE
Occupational Therapy Screen       07/24/23 1351   Note Type   Note type Screen   Additional Comments OT orders received. Chart reviewed. Patient is currently at baseline level of function for ADLs at this time, NO skilled inpatient OT services indicated.    Licensure   [place] License Number  Pia Hoover [id number]

## 2023-07-24 NOTE — PROGRESS NOTES
Daily Progress Note - [facility] [address] [facility] 63 y.o. female MRN: [id number]  Unit/Bed#: [address] [facility] 887-65 Encounter: [id number]  Admitting Physician: Ary Lilly MD   PCP: Yoel Trejo MD  Date of Admission:  7/23/2023  4:45 PM    Assessment and Plan    * SVT (supraventricular tachycardia) ([address])  Assessment & Plan    Pt presenting on arrival with HR of 198 bpm and hypotensive. Given 6 mg of Adenosine, IV, once in the ED. Converted to NSR. Echo from 6/1/23 showed EF of 55% and no HF. Currently on Metoprolol 12.5 mg, PO, Q12H at home. Refers compliance with medication. Prior episode was in May 2023 for which she was admitted to [address]. · Admit to STEPDOWN 2 for continuous cardiac monitoring (telemetry)   · Cardiology consult  · Patient will receive cardiac cath on 07/25. · Will need outpatient EP  · Continue Metoprolol 12.5 mg, PO, Q12H but hold for SBP < 110 mmHg  · Cardiac diet, low fat, low cholesterol, no caffeine  · Avoid stimulants  · FU on CXR, UA, PTT, PT/INR   · Replete electrolytes   · Can try non pharmacological measures if recurrent SVT like Valsalva maneuver, cold water to the face and ice pack application   · If persistent, consult ICU nurse/rapid response for adenosine administration     Hypomagnesemia  Assessment & Plan    Mag 1.6 on admission. Given 2 g of mag, IV, once in ED. · Monitor Magnesium levels  · Maintain Mag at 2 or above  · Magnesium levels at AM     Hypotension  Assessment & Plan    Pt's BP on arrival was 73/33 mmHg with HR of 198. Pt was given 2 boluses of 1 L of NS. Last BP 99/66 mmHg. · Monitor BP   · Hold Metoprolol 12.5 mg, PO, Q12H for SBP <110 mmHg    Hypothyroid  Assessment & Plan    Lab Results   Component Value Date    SLH5QMEHVFER 24.901 (H) 07/23/2023     Pt currently on Levothyroxine 88 mcg, PO, QD. Refers compliance with medications.  TSH has improved from prior on 6/1/23 that was 143.129, but continues to be elevated. · Increase dosage of Levothyroxine from 88 mcg to 100 mcg, PO, QD   · Repeat TSH in 6 weeks          Anemia  Assessment & Plan    Hgb 14.3 on admission. Anemia has resolved. Currently on Ferrous sulfate 325 mg, PO, QD. No complaints of constipation. · Continue iron supplementation   · Transition to every other day for 6 more months    Hypokalemia  Assessment & Plan    K 3.2 on admission. Was given 40 mEq of KDUR and 20 mEq of IV KCL. · Will give 20 mEq of KDUR extra  · Maintain K at 4 or above  · CMP at AM     Nicotine abuse  Assessment & Plan    Pt is current smoker. Refers smoking 1 PPD. · Nicotine patch 21 mg, 1 patch, transdermal, QD   · Tobacco cessation education       Depression with anxiety  Assessment & Plan    Stable. Pt refers not feeling depressed. Mentions occasional episodes of anxiety. On Hydroxyzine 25 mg, PO, TID PRN and Quetiapine 25 mg, PO, QD HS PRN. · Continue home medications    Chronic obstructive pulmonary disease (HCC)  Assessment & Plan    Stable. SpO2 on admission was 94%. Placed on 2 L/min of O2 per NC with saturations ranging from %. Only on Albuterol 90 mcg/act inhaler, 2 puffs, Inhalation, Q6H PRN at home. · Continue oxygen supplementation as needed  · Monitor SpO2  · Hold albuterol in the setting of tachycardia       VTE Pharmacologic Prophylaxis: VTE Score: 2 Low Risk (Score 0-2) - Encourage Ambulation. Patient Centered Rounds: I have performed bedside rounds with nursing staff today. Discussions with Specialists or Other Care Team Provider: Cardiology    Education and Discussions with Family / Patient:   Patient Information Sharing: With the consent of César Seen , their loved ones, Liberty Wilson, were notified today by inpatient team of the patient’s condition and current plan. All questions answered.      Current Length of Stay: 1 day(s)    Current Patient Status: Inpatient   Certification Statement: The patient will continue to require additional inpatient hospital stay due to Monitoring of blood pressures as well as heart rate    Discharge Plan: Discharge tomorrow with outpatient EP evaluation    Code Status: Level 1 - Full Code    Subjective:   Patient seen and examined at bedside. Patient reported no acute events overnight and has not had tachycardia since being cardioverted with adenosine in the ED. Patient reports resolved symptoms such as dizziness, lightheadedness, shortness of breath. Objective     Objective:   Vitals:   Temp (24hrs), Av.8 °F (36.6 °C), Min:97.3 °F (36.3 °C), Max:98.8 °F (37.1 °C)    Temp:  [97.3 °F (36.3 °C)-98.8 °F (37.1 °C)] 98.8 °F (37.1 °C)  HR:  [] 75  Resp:  [17-26] 18  BP: ()/(33-68) 96/68  SpO2:  [85 %-100 %] 90 %  Body mass index is 21.83 kg/m². Input and Output Summary (last 24 hours): Intake/Output Summary (Last 24 hours) at 2023 1558  Last data filed at 2023 2316  Gross per 24 hour   Intake 2050 ml   Output 600 ml   Net 1450 ml       Physical Exam:   Physical Exam  Vitals reviewed. Constitutional:       General: She is not in acute distress. Appearance: Normal appearance. She is not ill-appearing. Eyes:      General: No scleral icterus. Extraocular Movements: Extraocular movements intact. Conjunctiva/sclera: Conjunctivae normal.   Cardiovascular:      Rate and Rhythm: Normal rate and regular rhythm. Pulses: Normal pulses. Heart sounds: Murmur heard. Pulmonary:      Effort: Pulmonary effort is normal. No respiratory distress. Breath sounds: Normal breath sounds. Abdominal:      General: Bowel sounds are normal. There is no distension. Palpations: Abdomen is soft. Tenderness: There is no abdominal tenderness. There is no right CVA tenderness, left CVA tenderness, guarding or rebound. Musculoskeletal:      Right lower leg: No edema. Left lower leg: No edema. Skin:     General: Skin is warm and dry. Capillary Refill: Capillary refill takes less than 2 seconds. Coloration: Skin is pale. Neurological:      Mental Status: She is alert and oriented to person, place, and time. Mental status is at baseline. Psychiatric:         Mood and Affect: Mood normal.         Behavior: Behavior normal.         Additional Data:     Labs:  Results from last 7 days   Lab Units 07/24/23  0531 07/23/23 2203 07/23/23  1710   WBC Thousand/uL 7.19  --  10.74*   HEMOGLOBIN g/dL 12.6  --  14.3   HEMATOCRIT % 40.3  --  44.6   PLATELETS Thousands/uL 271   < > 341   NEUTROS PCT %  --   --  62   LYMPHS PCT %  --   --  28   MONOS PCT %  --   --  6   EOS PCT %  --   --  3    < > = values in this interval not displayed. Results from last 7 days   Lab Units 07/24/23  0531   POTASSIUM mmol/L 5.0   CHLORIDE mmol/L 107   CO2 mmol/L 30   BUN mg/dL 8   CREATININE mg/dL 0.72   CALCIUM mg/dL 8.5   ALK PHOS U/L 42   ALT U/L 8   AST U/L 13     Results from last 7 days   Lab Units 07/23/23  2203   INR  1.06               * I Have Reviewed All Lab Data Listed Above. * Additional Pertinent Lab Tests Reviewed:  [address] Admission Reviewed    Imaging:    Imaging Reports Reviewed Today Include:   XR chest pa & lateral    (Results Pending)     Imaging Personally Reviewed by Myself Includes: Please see above    Recent Cultures (last 7 days):           Last 24 Hours Medication List:   Current Facility-Administered Medications   Medication Dose Route Frequency Provider Last Rate   • acetaminophen  650 mg Oral Q6H PRN Jose Harris MD     • atorvastatin  40 mg Oral Daily With Kory Dill MD     • calcium carbonate  1,000 mg Oral Daily PRN Valente Mcclain MD     • vitamin B-12  1,000 mcg Oral Daily Valente Mcclain MD     • docusate sodium  100 mg Oral BID Valente Mcclain MD     • ferrous sulfate  325 mg Oral Every Other Day Valente Mcclain MD     • fluticasone  1 spray Nasal Daily Sonja Nolan Lainez MD     • gabapentin  400 mg Oral BID Radha Mac MD     • hydrOXYzine HCL  25 mg Oral TID PRN Radha Mac MD     • levothyroxine  100 mcg Oral Early Morning Radha Mac MD     • loratadine  10 mg Oral Daily Radha Mac MD     • metoprolol tartrate  12.5 mg Oral Q12H [address] Radha Mac MD     • nicotine  21 mg Transdermal Daily Radha Mac MD     • ondansetron  4 mg Intravenous Q6H PRN Radha Mac MD     • QUEtiapine  25 mg Oral HS PRN Radha Mac MD               ** Please Note: Dictation voice to text software may have been used in the creation of this document.  **    Josue Jay MD  07/24/23  3:58 PM

## 2023-07-24 NOTE — CASE MANAGEMENT
Case Management Assessment & Discharge Planning Note    Patient name Roby [facility]  Location [facility] 417/4 [address]-* MRN [id number]  : 1960 Date 2023       Current Admission Date: 2023  Current Admission Diagnosis:SVT (supraventricular tachycardia) [facility])   Patient Active Problem List    Diagnosis Date Noted   • Hypotension 2023   • Hypomagnesemia 2023   • Seborrheic keratoses, inflamed 2023   • Hypothyroid 2023   • SVT (supraventricular tachycardia) ([address]) 2023   • Depression with anxiety 2023   • Nicotine abuse 2023   • Hypokalemia 2023   • Anemia 2023   • Chronic low back pain 2023   • Chronic obstructive pulmonary disease ([address]) 2019      LOS (days): 1  Geometric Mean LOS (GMLOS) (days): 2.30  Days to GMLOS:1.5     OBJECTIVE:    Risk of Unplanned Readmission Score: 17.3         Current admission status: Inpatient     Preferred Pharmacy:   [address] [place] [id number]  Phone: [phone] Fax: [phone]    [id number] [facility], [address] [place] [address]  [address] [address]  Phone: [phone] Fax: [phone]    Primary Care Provider: Yoel Trejo MD    Primary Insurance: [facility]  Secondary Insurance:     ASSESSMENT:  [facility] Proxies    There are no active Health Care Proxies on file. Readmission Root Cause  30 Day Readmission: No    Patient Information  Admitted from[de-identified] Home  Mental Status: Alert  During Assessment patient was accompanied by: Not accompanied during assessment  Assessment information provided by[de-identified] Patient  Primary Caregiver: Self  Support Systems: [address] of Residence: [address] [place] [address] do you live in?: [address] [place] [address] Road entry access options.  Select all that apply.: Stairs  Number of steps to enter home.: One Flight  Do the steps have railings?: Yes  Type of Current Residence: 2 story home  Upon entering residence, is there a bedroom on the main floor (no further steps)?: No  A bedroom is located on the following floor levels of residence (select all that apply):: 2nd Floor  Upon entering residence, is there a bathroom on the main floor (no further steps)?: No  Indicate which floors of current residence have a bathroom (select all the apply):: 2nd Floor  Number of steps to 2nd floor from main floor: One Flight  In the last 12 months, was there a time when you were not able to pay the mortgage or rent on time?: No  In the last 12 months, how many places have you lived?: 1  In the last 12 months, was there a time when you did not have a steady place to sleep or slept in a shelter (including now)?: No  Homeless/housing insecurity resource given?: N/A  Living Arrangements: Lives Alone  Is patient a ?: No    Activities of Daily Living Prior to Admission  Functional Status: Independent  Completes ADLs independently?: Yes  Ambulates independently?: Yes  Does patient use assisted devices?: No  Does patient currently own DME?: No  Does patient have a history of Outpatient Therapy (PT/OT)?: No  Does the patient have a history of Short-Term Rehab?: No  Does patient have a history of HHC?: No  Does patient currently have [facility]?: No     Patient Information Continued  Income Source: Employed  Does patient have prescription coverage?: Yes  Within the past 12 months, you worried that your food would run out before you got the money to buy more.: Never true  Within the past 12 months, the food you bought just didn't last and you didn't have money to get more.: Never true  Food insecurity resource given?: N/A  Does patient receive dialysis treatments?: No     Means of Transportation  Means of Transport to Appts[de-identified] Friends  In the past 12 months, has lack of transportation kept you from medical appointments or from getting medications?: No  In the past 12 months, has lack of transportation kept you from meetings, work, or from getting things needed for daily living?: No  Was application for public transport provided?: N/A    DISCHARGE DETAILS:    Discharge planning discussed with[de-identified] Patient     [address]         Is the patient interested in [address] at discharge?: No    DME Referral Provided  Referral made for DME?: No    Other Referral/Resources/Interventions Provided:  Interventions: None Indicated, Transportation  Referral Comments: CM met with patient at bedside, introduced self, role, complete assessment and discharge planning. Patient stated she lives alone on a 2nd level house with a flight of stairs. No use of dme per patient and uses Maryetta Spurr, taxi and sometimes friend with provide transportation. Patient denied having any issues/ concerns that CM could assist with. Patient did mention that she will require Lyft requested at discharge. No CM needs indicatd at this time, CM will continue to follow until discharge and request Lyft at d/c.      Treatment Team Recommendation: Home  Discharge Destination Plan[de-identified] Home  Transport at Discharge : Ride Share

## 2023-07-24 NOTE — PLAN OF CARE
Problem: PHYSICAL THERAPY ADULT  Goal: Performs mobility at highest level of function for planned discharge setting. See evaluation for individualized goals. Description: Treatment/Interventions: Functional transfer training, LE strengthening/ROM, Therapeutic exercise, Endurance training, Gait training, Compensatory technique education, Spoke to nursing          See flowsheet documentation for full assessment, interventions and recommendations. Note: Prognosis: Good  Problem List: Decreased strength, Decreased endurance, Impaired balance, Decreased mobility, Pain  Assessment: Patient seen for Physical Therapy evaluation. Patient admitted with SVT (supraventricular tachycardia) ([address]). Comorbidities affecting patient's physical performance include: anemia, anxiety, chronic back pain, COPD, depression and hypothyroid. Personal factors affecting patient at time of initial evaluation include: lives in 1 story house, stairs to enter home and anxiety. Prior to admission, patient was independent with functional mobility without assistive device, independent with ADLS, independent with IADLS, living alone in 1 story home with 14 steps to enter, ambulating household distance, ambulating community distances and works part time. Please find objective findings from Physical Therapy assessment regarding body systems outlined above with impairments and limitations including weakness, impaired balance, decreased endurance, gait deviations, pain, decreased activity tolerance, decreased functional mobility tolerance, fall risk and SOB upon exertion. The Barthel Index was used as a functional outcome tool presenting with a score of Barthel Index Score: 75 today indicating moderate limitations of functional mobility and ADLS. Patient's clinical presentation is currently unstable/unpredictable as seen in patient's presentation of vital sign response, changing level of pain and decreased endurance.  Pt would benefit from continued Physical Therapy treatment to address deficits as defined above and maximize level of functional mobility. As demonstrated by objective findings, the assigned level of complexity for this evaluation is high. The patient's AM-PAC Basic Mobility Inpatient Short Form Raw Score is 18. A Raw score of greater than 16 suggests the patient may benefit from discharge to home. Please also refer to the recommendation of the Physical Therapist for safe discharge planning. PT Discharge Recommendation: No rehabilitation needs    See flowsheet documentation for full assessment.

## 2023-07-24 NOTE — UTILIZATION REVIEW
Initial Clinical Review    Admission: Date/Time/Statement:   Admission Orders (From admission, onward)     Ordered        07/23/23 2004  Inpatient Admission  Once                      Orders Placed This Encounter   Procedures   • Inpatient Admission     Standing Status:   Standing     Number of Occurrences:   1     Order Specific Question:   Level of Care     Answer:   Med Surg [16]     Order Specific Question:   Estimated length of stay     Answer:   More than 2 Midnights     Order Specific Question:   Certification     Answer:   I certify that inpatient services are medically necessary for this patient for a duration of greater than two midnights. See H&P and MD Progress Notes for additional information about the patient's course of treatment. ED Arrival Information     Expected   -    Arrival   7/23/2023 16:44    Acuity   Immediate            Means of arrival   Ambulance    Escorted by   [address]    Admission type   Emergency            Arrival complaint   chest pain              Chief Complaint   Patient presents with   • Palpitations     Pt was at [facility] felt lightheaded and dizzy, hx of svt       Initial Presentation:   63 yof to ER from shopping c/o feeling lightheaded/dizzy, headache & visual disturbance/blurred, SOB. Hx Hypothyroidism, AVTAR, MDD, ABRAHAM, SVT and seasonal allergies. Presents tachycardic, hypotensive, s/s as above. Admission work-up showing SVT, Adenosine given & converted back to NSR in 80's, hypokalemia, hypomagnesemia, elevated troponin. Admitted to inpatient status for SVT. Date: 7/24/23   Day 2:   SVT mgt per cardio. Remains on telemetry monitoring. Per cardio: SVT  Telemetry monitoring. Continue Lopressor 12.5 mg twice daily. Monitor electrolytes.  Replete potassium above 4 and magnesium above 2.      Date: 7/25/23    Day 3: Has surpassed a 2nd midnight with active treatments and services, which include scheduled for cardiac cath today, telemetry, monitor VS, labs/lytes. .    ED Triage Vitals   Temperature Pulse Respirations Blood Pressure SpO2   07/23/23 1712 07/23/23 1648 07/23/23 1712 07/23/23 1648 07/23/23 1648   97.6 °F (36.4 °C) (!) 198 20 (!) 73/33 94 %      Temp Source Heart Rate Source Patient Position - Orthostatic VS BP Location FiO2 (%)   07/23/23 1712 07/23/23 1648 07/23/23 1648 07/23/23 1648 --   Oral Monitor Sitting Left arm       Pain Score       07/23/23 1713       8          Wt Readings from Last 1 Encounters:   07/25/23 50.8 kg (112 lb)     Additional Vital Signs:   Date/Time Temp Pulse Resp BP MAP (mmHg) SpO2 Calculated FIO2 (%) - Nasal Cannula Nasal Cannula O2 Flow Rate (L/min) O2 Device Patient Position - Orthostatic VS   07/24/23 02:20:51 98 °F (36.7 °C) 78 19 94/65 75 96 % -- -- -- --   07/23/23 22:27:44 97.6 °F (36.4 °C) 70 20 94/61 72 93 % -- -- -- --   07/23/23 21:46:29 -- 84 -- -- -- 91 % -- -- -- --   07/23/23 21:30:28 97.3 °F (36.3 °C) Abnormal  -- 20 99/66 77 -- -- -- -- --   07/23/23 2045 97.6 °F (36.4 °C) 82 26 Abnormal  101/62 76 95 % -- -- -- --   07/23/23 2030 -- 73 22 98/60 74 97 % -- -- -- --   07/23/23 2015 -- 74 24 Abnormal  96/57 72 97 % -- -- -- --   07/23/23 2000 -- 76 22 99/60 75 98 % -- -- -- --   07/23/23 1945 -- 74 17 98/56 72 98 % -- -- -- --   07/23/23 1930 -- 73 20 100/56 72 98 % -- -- -- --   07/23/23 1900 -- 75 19 95/57 71 97 % -- -- -- --   07/23/23 1830 -- 78 22 91/54 68 96 % -- -- -- --   07/23/23 1815 -- 81 20 87/51 Abnormal  64 Abnormal  96 % -- -- -- --   07/23/23 1800 -- 84 22 86/51 Abnormal  64 Abnormal  95 % -- -- Nasal cannula --   07/23/23 1758 -- -- -- -- -- 87 % Abnormal   -- -- None (Room air) --   SpO2: Placed back on 2L O2 Md at bedside.  at 07/23/23 1758 07/23/23 1748 -- -- -- 92/51 -- -- -- -- -- --   07/23/23 1745 -- 80 22 92/51 67 90 % -- -- None (Room air) --   07/23/23 1735 -- -- -- -- -- -- -- -- Nasal cannula  --   O2 Device: for comfort at 07/23/23 1735 07/23/23 1730 -- 80 24 Abnormal  82/51 Abnormal  62 Abnormal  100 % -- -- -- --   07/23/23 1729 -- 80 20 -- -- -- -- -- -- --   07/23/23 1715 -- 81 -- 79/53 Abnormal  62 Abnormal  99 % 28 2 L/min Nasal cannula --   07/23/23 1712 97.6 °F (36.4 °C) -- 20 -- -- -- -- -- Nasal cannula --   07/23/23 1648 -- 198 Abnormal  -- 73/33 Abnormal  -- 94 % -- -- -- Sitting     Pertinent Labs/Diagnostic Test Results:   XR chest pa & lateral    (Results Pending)     7/23 Ekg=  SVT    Results from last 7 days   Lab Units 07/25/23  0504 07/24/23  0531 07/23/23 2203 07/23/23  1710   WBC Thousand/uL 7.43 7.19  --  10.74*   HEMOGLOBIN g/dL 12.9 12.6  --  14.3   HEMATOCRIT % 41.3 40.3  --  44.6   PLATELETS Thousands/uL 251 271 268 341   NEUTROS ABS Thousands/µL  --   --   --  6.67     Results from last 7 days   Lab Units 07/25/23  0504 07/24/23  0531 07/23/23  1753   SODIUM mmol/L 134* 139 136   POTASSIUM mmol/L 4.2 5.0 3.2*   CHLORIDE mmol/L 100 107 104   CO2 mmol/L 31 30 24   ANION GAP mmol/L 3 2 8   BUN mg/dL 15 8 10   CREATININE mg/dL 0.62 0.72 0.68   EGFR ml/min/1.73sq m 98 91 95   CALCIUM mg/dL 8.7 8.5 7.7*   MAGNESIUM mg/dL  --  2.5 1.6*   PHOSPHORUS mg/dL  --   --  3.6     Results from last 7 days   Lab Units 07/24/23  0531 07/23/23  1753   AST U/L 13 14   ALT U/L 8 9   ALK PHOS U/L 42 38   TOTAL PROTEIN g/dL 6.0* 5.4*   ALBUMIN g/dL 3.6 3.3*   TOTAL BILIRUBIN mg/dL 0.35 0.33     Results from last 7 days   Lab Units 07/25/23  0504 07/24/23  0531 07/23/23  1753   GLUCOSE RANDOM mg/dL 83 72 92     Results from last 7 days   Lab Units 07/23/23  2203 07/23/23  1941 07/23/23  1721   HS TNI 0HR ng/L  --   --  <2   HS TNI 2HR ng/L  --  7  --    HSTNI D2 ng/L  --  >5  --    HS TNI 4HR ng/L 81*  --   --    HSTNI D4 ng/L >79*  --   --      Results from last 7 days   Lab Units 07/25/23  0504 07/23/23 2203   PROTIME seconds 13.4 13.9   INR  1.01 1.06   PTT seconds 30 31     Results from last 7 days   Lab Units 07/23/23  1753   TSH 3RD GENERATON uIU/mL 24.901*     Results from last 7 days   Lab Units 07/23/23  2313   CLARITY UA  Clear   COLOR UA  Yellow   SPEC GRAV UA  1.010   PH UA  6.0   GLUCOSE UA mg/dl Negative   KETONES UA mg/dl Negative   BLOOD UA  Negative   PROTEIN UA mg/dl Negative   NITRITE UA  Negative   BILIRUBIN UA  Negative   UROBILINOGEN UA E.U./dl 0.2   LEUKOCYTES UA  Negative   WBC UA /hpf None Seen   RBC UA /hpf None Seen   BACTERIA UA /hpf Occasional   EPITHELIAL CELLS WET PREP /hpf Occasional     ED Treatment:   Medication Administration from 07/23/2023 1644 to 07/23/2023 2125       Date/Time Order Dose Route Action     07/23/2023 1648 EDT adenosine (ADENOCARD) injection 6 mg 6 mg Intravenous Given     07/23/2023 1710 EDT ondansetron (ZOFRAN) injection 4 mg 4 mg Intravenous Given     07/23/2023 1716 EDT sodium chloride 0.9 % bolus 1,000 mL 1,000 mL Intravenous New Bag     07/23/2023 1822 EDT sodium chloride 0.9 % bolus 1,000 mL 1,000 mL Intravenous New Bag     07/23/2023 1847 EDT potassium chloride 20 mEq IVPB (premix) 20 mEq Intravenous New Bag     07/23/2023 1853 EDT potassium chloride (K-DUR,KLOR-CON) CR tablet 40 mEq 40 mEq Oral Given     07/23/2023 1844 EDT magnesium sulfate 2 g/50 mL IVPB (premix) 2 g 2 g Intravenous New Bag        Past Medical History:   Diagnosis Date   • Anxiety    • Depression    • Disease of thyroid gland    • Elevated troponin 5/31/2023   • History of alcohol abuse 5/31/2023   • Substance abuse ([address])      Present on Admission:  • Hypothyroid  • Depression with anxiety  • Nicotine abuse  • SVT (supraventricular tachycardia) (HCC)  • Anemia  • Chronic obstructive pulmonary disease (HCC)      Admitting Diagnosis: Palpitations [R00.2]  Hypokalemia [E87.6]  Hypomagnesemia [E83.42]  SVT (supraventricular tachycardia) (HCC) [I47.1]  Hypotension [I95.9]  Elevated TSH [R79.89]  Headache [R51.9]  Age/Sex: 63 y.o. female  Admission Orders:  Telemetry  Pt/ot eval & tx  Scd/foot pumps  Consult cardio    Scheduled Medications:  atorvastatin, 40 mg, Oral, Daily With Dinner  vitamin B-12, 1,000 mcg, Oral, Daily  docusate sodium, 100 mg, Oral, BID  ferrous sulfate, 325 mg, Oral, Every Other Day  fluticasone, 1 spray, Nasal, Daily  gabapentin, 400 mg, Oral, BID  levothyroxine, 100 mcg, Oral, Early Morning  loratadine, 10 mg, Oral, Daily  metoprolol tartrate, 12.5 mg, Oral, Q12H [facility]  nicotine, 21 mg, Transdermal, Daily      Continuous IV Infusions:     PRN Meds:  acetaminophen, 650 mg, Oral, Q6H PRN  calcium carbonate, 1,000 mg, Oral, Daily PRN  hydrOXYzine HCL, 25 mg, Oral, TID PRN  ondansetron, 4 mg, Intravenous, Q6H PRN  QUEtiapine, 25 mg, Oral, HS PRN      Network Utilization Review Department  ATTENTION: Please call with any questions or concerns to [phone] and carefully listen to the prompts so that you are directed to the right person. All voicemails are confidential.  Kassie Smith all requests for admission clinical reviews, approved or denied determinations and any other requests to dedicated fax number below belonging to the campus where the patient is receiving treatment.  List of dedicated fax numbers for the Facilities:  [place] DENIALS (Administrative/Medical Necessity) [phone] [address] [facility] (Maternity/NICU/Pediatrics) [phone]   [address] [place] [address] Drive [phone]   [facility] [phone]   [facility] [address] [place] [address] [phone]   [address] [address] [place] [address] [phone]

## 2023-07-24 NOTE — PLAN OF CARE
Problem: Potential for Falls  Goal: Patient will remain free of falls  Description: INTERVENTIONS:  - Educate patient/family on patient safety including physical limitations  - Instruct patient to call for assistance with activity   - Consult OT/PT to assist with strengthening/mobility   - Keep Call bell within reach  - Keep bed low and locked with side rails adjusted as appropriate  - Keep care items and personal belongings within reach  - Initiate and maintain comfort rounds  - Make Fall Risk Sign visible to staff  - Offer Toileting every 2 Hours, in advance of need  - Initiate/Maintain bed alarm  - Obtain necessary fall risk management equipment: slipper socks  - Apply yellow socks and bracelet for high fall risk patients  - Consider moving patient to room near nurses station  Outcome: Progressing     Problem: CARDIOVASCULAR - ADULT  Goal: Maintains optimal cardiac output and hemodynamic stability  Description: INTERVENTIONS:  - Monitor I/O, vital signs and rhythm  - Monitor for S/S and trends of decreased cardiac output  - Administer and titrate ordered vasoactive medications to optimize hemodynamic stability  - Assess quality of pulses, skin color and temperature  - Assess for signs of decreased coronary artery perfusion  - Instruct patient to report change in severity of symptoms  Outcome: Progressing  Goal: Absence of cardiac dysrhythmias or at baseline rhythm  Description: INTERVENTIONS:  - Continuous cardiac monitoring, vital signs, obtain 12 lead EKG if ordered  - Administer antiarrhythmic and heart rate control medications as ordered  - Monitor electrolytes and administer replacement therapy as ordered  Outcome: Progressing     Problem: RESPIRATORY - ADULT  Goal: Achieves optimal ventilation and oxygenation  Description: INTERVENTIONS:  - Assess for changes in respiratory status  - Assess for changes in mentation and behavior  - Position to facilitate oxygenation and minimize respiratory effort  - Oxygen administered by appropriate delivery if ordered  - Initiate smoking cessation education as indicated  - Encourage broncho-pulmonary hygiene including cough, deep breathe, Incentive Spirometry  - Assess the need for suctioning and aspirate as needed  - Assess and instruct to report SOB or any respiratory difficulty  - Respiratory Therapy support as indicated  Outcome: Progressing

## 2023-07-24 NOTE — PLAN OF CARE
Problem: Potential for Falls  Goal: Patient will remain free of falls  Description: INTERVENTIONS:  - Educate patient/family on patient safety including physical limitations  - Instruct patient to call for assistance with activity   - Consult OT/PT to assist with strengthening/mobility   - Keep Call bell within reach  - Keep bed low and locked with side rails adjusted as appropriate  - Keep care items and personal belongings within reach  - Initiate and maintain comfort rounds  - Make Fall Risk Sign visible to staff  - Offer Toileting every 2 Hours, in advance of need  - Initiate/Maintain Bed alarm  - Apply yellow socks and bracelet for high fall risk patients  - Consider moving patient to room near nurses station  Outcome: Progressing     Problem: CARDIOVASCULAR - ADULT  Goal: Maintains optimal cardiac output and hemodynamic stability  Description: INTERVENTIONS:  - Monitor I/O, vital signs and rhythm  - Monitor for S/S and trends of decreased cardiac output  - Administer and titrate ordered vasoactive medications to optimize hemodynamic stability  - Assess quality of pulses, skin color and temperature  - Assess for signs of decreased coronary artery perfusion  - Instruct patient to report change in severity of symptoms  Outcome: Progressing  Goal: Absence of cardiac dysrhythmias or at baseline rhythm  Description: INTERVENTIONS:  - Continuous cardiac monitoring, vital signs, obtain 12 lead EKG if ordered  - Administer antiarrhythmic and heart rate control medications as ordered  - Monitor electrolytes and administer replacement therapy as ordered  Outcome: Progressing     Problem: RESPIRATORY - ADULT  Goal: Achieves optimal ventilation and oxygenation  Description: INTERVENTIONS:  - Assess for changes in respiratory status  - Assess for changes in mentation and behavior  - Position to facilitate oxygenation and minimize respiratory effort  - Oxygen administered by appropriate delivery if ordered  - Initiate smoking cessation education as indicated  - Encourage broncho-pulmonary hygiene including cough, deep breathe, Incentive Spirometry  - Assess the need for suctioning and aspirate as needed  - Assess and instruct to report SOB or any respiratory difficulty  - Respiratory Therapy support as indicated  Outcome: Progressing

## 2023-07-24 NOTE — H&P
History and Physical - [facility] Medicine Residency     Patient Information:    Name: Mis Gallagher   Age: 63 y.o. female   MRN: [id number]  Unit/Bed#: 913 [facility] 415-01   Encounter: [id number]  Admitting Physician: Lux Garcia MD   PCP: Deborah Dupont MD  Date of Admission:  07/23/23     Assessment and Plan     * SVT (supraventricular tachycardia) ([address])  Assessment & Plan    Pt presenting on arrival with HR of 198 bpm and hypotensive. Given 6 mg of Adenosine, IV, once in the ED. Converted to NSR. Echo from 6/1/23 showed EF of 55% and no HF. Currently on Metoprolol 12.5 mg, PO, Q12H at home. Refers compliance with medication. Prior episode was in May 2023 for which she was admitted to [address]. · Admit to STEPDOWN 2 for continuous cardiac monitoring (telemetry)   · Cardiology consult  · Continue Metoprolol 12.5 mg, PO, Q12H but hold for SBP < 110 mmHg  · Cardiac diet, low fat, low cholesterol, no caffeine  · Avoid stimulants  · FU on CXR, UA, PTT, PT/INR   · Replete electrolytes   · Can try non pharmacological measures if recurrent SVT like Valsalva maneuver, cold water to the face and ice pack application   · If persistent, consult ICU nurse/rapid response for adenosine administration     Hypotension  Assessment & Plan    Pt's BP on arrival was 73/33 mmHg with HR of 198. Pt was given 2 boluses of 1 L of NS. Last BP 99/66 mmHg. · Monitor BP   · Hold Metoprolol 12.5 mg, PO, Q12H for SBP <110 mmHg    Hypokalemia  Assessment & Plan    K 3.2 on admission. Was given 40 mEq of KDUR and 20 mEq of IV KCL. · Will give 20 mEq of KDUR extra  · Maintain K at 4 or above  · CMP at AM     Hypomagnesemia  Assessment & Plan    Mag 1.6 on admission. Given 2 g of mag, IV, once in ED.      · Monitor Magnesium levels  · Maintain Mag at 2 or above  · Magnesium levels at AM     Hypothyroid  Assessment & Plan    Lab Results   Component Value Date    [id number] 24.901 (H) 07/23/2023     Pt currently on Levothyroxine 88 mcg, PO, QD. Refers compliance with medications. TSH has improved from prior on 6/1/23 that was 143.129, but continues to be elevated. · Increase dosage of Levothyroxine from 88 mcg to 100 mcg, PO, QD   · Repeat TSH in 6 weeks    Chronic obstructive pulmonary disease (HCC)  Assessment & Plan    Stable. SpO2 on admission was 94%. Placed on 2 L/min of O2 per NC with saturations ranging from %. Only on Albuterol 90 mcg/act inhaler, 2 puffs, Inhalation, Q6H PRN at home. · Continue oxygen supplementation as needed  · Monitor SpO2  · Hold albuterol in the setting of tachycardia     Anemia  Assessment & Plan    Hgb 14.3 on admission. Anemia has resolved. Currently on Ferrous sulfate 325 mg, PO, QD. No complaints of constipation. · Continue iron supplementation   · Transition to every other day for 6 more months    Nicotine abuse  Assessment & Plan    Pt is current smoker. Refers smoking 1 PPD. · Nicotine patch 21 mg, 1 patch, transdermal, QD   · Tobacco cessation education     Depression with anxiety  Assessment & Plan    Stable. Pt refers not feeling depressed. Mentions occasional episodes of anxiety. On Hydroxyzine 25 mg, PO, TID PRN and Quetiapine 25 mg, PO, QD HS PRN. · Continue home medications    Fluids: 2 L of NS in ED   Electrolyte repletion: Replete potassium and magnesium now, and as needed. Nutrition:          Diet Orders   (From admission, onward)             Start     Ordered    07/23/23 2113  Diet Cardiovascular; Cardiac; Lo Fat, No Caffeine, Lo Cholesterol  Diet effective now        References:    Adult Nutrition Support Algorithm    RD Therapeutic Diet Order Protocol   Question Answer Comment   Diet Type Cardiovascular    Cardiac Cardiac    Other Restriction(s): Lo Fat    Other Restriction(s): No Caffeine    Other Restriction(s): Lo Cholesterol    RD to adjust diet per protocol?  Yes        07/23/23 2112               VTE Prophylaxis: VTE Score: 2 Low Risk (Score 0-2) - Encourage Ambulation. Code Status: Level 1 - Full Code  Anticipated Length of Stay:  Patient will be admitted on an Inpatient basis with an anticipated length of stay of  more than 2 midnights. Justification for Hospital Stay: Hypotension and SVT management. Total Time for Visit, including Counseling / Coordination of Care: 50 mins. Greater than 50% of this total time spent on direct patient counseling and coordination of care. Patient Information Sharing: Geneva Deras does not wish inpatient team to notify their loved ones of their condition and current plan. Chief Complaint:     Chief Complaint   Patient presents with   • Palpitations     Pt was at [facility] felt lightheaded and dizzy, hx of svt     Subjective      History of Present Illness:     Geneva Deras is a 63 y.o. female with PMH of Hypothyroidism, AVTAR, MDD, ABRAHAM, SVT and seasonal allergies who presents with complaints of feeling lightheaded and dizzy. Pt refers she was in [employer] and was paying her groceries when she suddenly felt lightheaded and dizzy and her vision got blurry and dark. Mentions not passing out completely and the symptoms weren't going away. PT was brought to the ED per ambulance. Mentions that the last time she felt these symptoms was when she was admitted to [facility] in May 2023 for management of SVT. Refers no drug use. Has not consumed alcohol in the past five years. Smokes about a pack of cigarettes per day, Pt refers that she consumes about 3 cups of coffee daily and one small can of coca cola daily. Denies consuming any other stimulant drinks. Pt refers that she ate and was hydrating today. Refers no stressful situations during the day. Refers compliance with all of her medications. She lives alone on the second floor of her home and rents the first floor to relatives. She works as a .       Review of Systems:    Review of Systems   Constitutional: Negative for chills, diaphoresis, fatigue and fever.   HENT: Negative for congestion, rhinorrhea, sore throat, trouble swallowing and voice change. Eyes: Positive for visual disturbance (Dark vision). Respiratory: Negative for apnea, cough, choking, chest tightness, shortness of breath and wheezing. Cardiovascular: Positive for chest pain (Chest pressure not pain towards her right shoulder). Negative for palpitations and leg swelling. Gastrointestinal: Negative for abdominal pain, constipation, diarrhea, nausea and vomiting. Endocrine: Negative for cold intolerance and heat intolerance. Genitourinary: Negative for difficulty urinating, dysuria, vaginal discharge and vaginal pain. Musculoskeletal: Positive for arthralgias (OA). Skin: Negative for rash and wound. Allergic/Immunologic: Positive for environmental allergies. Neurological: Positive for dizziness, weakness and light-headedness. Negative for tremors, seizures, syncope, numbness and headaches. Chronic paresthesias in the fingers and toes   Psychiatric/Behavioral: Negative for agitation, confusion, self-injury, sleep disturbance and suicidal ideas. The patient is nervous/anxious. Past Medical History:   Diagnosis Date   • Anxiety    • Depression    • Disease of thyroid gland    • Elevated troponin 5/31/2023   • History of alcohol abuse 5/31/2023   • Substance abuse [facility])      Past Surgical History:   Procedure Laterality Date   • BACK SURGERY  01/01/1973    scolosis rods   • ELBOW SURGERY  05/01/2018   • WISDOM TOOTH EXTRACTION       Allergies   Allergen Reactions   • Other Allergic Rhinitis     seasonal     Prior to Admission Medications   Prescriptions Last Dose Informant Patient Reported? Taking?    FeroSul 325 (65 Fe) MG tablet Not Taking Self Yes No   Patient not taking: Reported on 7/23/2023   GaviLAX 17 GM/SCOOP powder   Yes No   Patient not taking: Reported on 7/3/2023   QUEtiapine (SEROquel) 25 mg tablet Unknown Self Yes No   Sig: Take 25 mg by mouth daily at bedtime as needed (for sleep)   albuterol (Ventolin HFA) 90 mcg/act inhaler 2023  No Yes   Sig: Inhale 2 puffs every 6 (six) hours as needed for wheezing or shortness of breath   atorvastatin (LIPITOR) 40 mg tablet 2023  No Yes   Sig: Take 1 tablet (40 mg total) by mouth daily with dinner   docusate sodium (COLACE) 100 mg capsule 2023 Self No Yes   Sig: Take 1 capsule (100 mg total) by mouth 2 (two) times a day   Patient taking differently: Take 100 mg by mouth daily Pt verbalized she takes this once daily   ferrous sulfate 324 (65 Fe) mg 2023  No Yes   Sig: Take 1 tablet (324 mg total) by mouth daily before breakfast   fluticasone (FLONASE) 50 mcg/act nasal spray 2023  No Yes   Si SPRAY INTO EACH NOSTRIL DAILY   gabapentin (NEURONTIN) 300 mg capsule 2023 Self Yes Yes   Sig: Take 400 mg by mouth 2 (two) times a day   gabapentin (NEURONTIN) 400 mg capsule   Yes No   Sig: Take 400 mg by mouth 3 (three) times a day   Patient not taking: Reported on 7/3/2023   hydrOXYzine HCL (ATARAX) 25 mg tablet 2023 Self Yes Yes   Sig: Take 25 mg by mouth 3 (three) times a day as needed   levothyroxine 88 mcg tablet 2023  No Yes   Sig: Take 1 tablet (88 mcg total) by mouth daily in the early morning   loratadine (CLARITIN) 10 mg tablet 2023  No Yes   Sig: Take 1 tablet (10 mg total) by mouth daily   metoprolol tartrate (LOPRESSOR) 25 mg tablet 2023  No Yes   Sig: Take 0.5 tablets (12.5 mg total) by mouth every 12 (twelve) hours Hold for systolic blood pressure less than 110 mmHg and heart rate less than 50 beats per minute. multivitamin [facility]) TABS 2023 Self Yes Yes   Sig: Take 1 tablet by mouth daily   nicotine (NICODERM CQ) 21 mg/24 hr TD 24 hr patch   No No   Sig: Place 1 patch on the skin over 24 hours daily Do not start before Leslie 3, 2023.    Patient not taking: Reported on 7/3/2023   polyethylene glycol (MIRALAX) 17 g packet   No No   Sig: Take 17 g by mouth daily as needed (constipation)   Patient not taking: Reported on 7/3/2023   vitamin B-12 (VITAMIN B-12) 1,000 mcg tablet 7/23/2023  No Yes   Sig: Take 1 tablet (1,000 mcg total) by mouth daily   vitamin B-12 (VITAMIN B-12) 1,000 mcg tablet   Yes No   Patient not taking: Reported on 7/3/2023      Facility-Administered Medications: None     Social History     Socioeconomic History   • Marital status:      Spouse name: Not on file   • Number of children: Not on file   • Years of education: Not on file   • Highest education level: Not on file   Occupational History   • Not on file   Tobacco Use   • Smoking status: Some Days     Packs/day: 1.00     Types: Cigarettes   • Smokeless tobacco: Never   Substance and Sexual Activity   • Alcohol use: Not Currently     Comment: none 7/2018-went to rehab   • Drug use: Not Currently   • Sexual activity: Not on file   Other Topics Concern   • Not on file   Social History Narrative   • Not on file     Social Determinants of Health     Financial Resource Strain: Not on file   Food Insecurity: No Food Insecurity (6/1/2023)    Hunger Vital Sign    • Worried About Running Out of Food in the Last Year: Never true    • Ran Out of Food in the Last Year: Never true   Transportation Needs: No Transportation Needs (6/1/2023)    PRAPARE - Transportation    • Lack of Transportation (Medical): No    • Lack of Transportation (Non-Medical):  No   Physical Activity: Not on file   Stress: Not on file   Social Connections: Not on file   Intimate Partner Violence: Not on file   Housing Stability: Unknown (6/1/2023)    Housing Stability Vital Sign    • Unable to Pay for Housing in the Last Year: No    • Number of Places Lived in the Last Year: Not on file    • Unstable Housing in the Last Year: No     Family History   Problem Relation Age of Onset   • Colon cancer Mother    • Breast cancer Mother    • Cancer Mother    • Leukemia Father      Patient Pre-hospital Living Situation: Lives in her own home.   Patient Pre-hospital Level of Mobility: Independent; Normal ambulation. Patient Pre-hospital Diet Restrictions: None. Objective     Physical Exam:     Vitals:     Blood Pressure: 99/66 (07/23/23 2130)  Pulse: 84 (07/23/23 2146)  Temperature: (!) 97.3 °F (36.3 °C) (07/23/23 2130)  Temp Source: Oral (07/23/23 2045)  Respirations: 20 (07/23/23 2130)  Weight - Scale: 50.7 kg (111 lb 12.8 oz) (07/23/23 2113)  SpO2: 91 % (07/23/23 2146)     Physical Exam  Vitals reviewed. Constitutional:       General: She is not in acute distress. Appearance: Normal appearance. She is normal weight. She is not ill-appearing or toxic-appearing. HENT:      Head: Normocephalic and atraumatic. Right Ear: External ear normal.      Left Ear: External ear normal.      Nose: Nose normal.      Mouth/Throat:      Mouth: Mucous membranes are moist.   Eyes:      General: No scleral icterus. Right eye: No discharge. Left eye: No discharge. Conjunctiva/sclera: Conjunctivae normal.   Cardiovascular:      Rate and Rhythm: Normal rate and regular rhythm. Pulses: Normal pulses. Heart sounds: Normal heart sounds. Pulmonary:      Effort: Pulmonary effort is normal. No respiratory distress. Breath sounds: Normal breath sounds. Abdominal:      General: Abdomen is flat. There is no distension. Palpations: Abdomen is soft. Tenderness: There is no abdominal tenderness. Musculoskeletal:         General: No swelling or tenderness. Normal range of motion. Cervical back: Normal range of motion. Right lower leg: No edema. Left lower leg: No edema. Skin:     General: Skin is warm and dry. Findings: No lesion or rash. Neurological:      Mental Status: She is alert and oriented to person, place, and time. Motor: No weakness. Psychiatric:         Mood and Affect: Mood normal.         Behavior: Behavior normal.         Thought Content:  Thought content normal. Judgment: Judgment normal.     Lab Results: I have personally reviewed pertinent reports. Results from last 7 days   Lab Units 07/23/23  2203 07/23/23  1710   WBC Thousand/uL  --  10.74*   HEMOGLOBIN g/dL  --  14.3   HEMATOCRIT %  --  44.6   PLATELETS Thousands/uL 268 341   NEUTROS PCT %  --  62   LYMPHS PCT %  --  28   MONOS PCT %  --  6   EOS PCT %  --  3     Results from last 7 days   Lab Units 07/23/23  1753   POTASSIUM mmol/L 3.2*   CHLORIDE mmol/L 104   CO2 mmol/L 24   BUN mg/dL 10   CREATININE mg/dL 0.68   CALCIUM mg/dL 7.7*   ALK PHOS U/L 38   ALT U/L 9   AST U/L 14   EGFR ml/min/1.73sq m 95   MAGNESIUM mg/dL 1.6*   PHOSPHORUS mg/dL 3.6          Results from last 7 days   Lab Units 07/23/23  1941 07/23/23  1721   HS TNI 0HR ng/L  --  <2   HS TNI 2HR ng/L 7  --       Imaging: I have personally reviewed pertinent reports. No results found.       EKG, Pathology, and Other Studies Reviewed on Admission:   EKG  Result Date: 07/23/23  Impression: NSR     Entire H&P was discussed with Dr. Don Emanuel who agreed to what is noted above     ** Please Note: This note has been constructed using a voice recognition system **     Dulce Martinez MD  07/23/23  10:25 PM

## 2023-07-24 NOTE — ED NOTES
Transported to [facility] via stretcher by this RN. Settled in bed, call bell within reach, all belongings are sent to bedside.       Carmen Siu RN  07/23/23 9807

## 2023-07-25 LAB
ANION GAP SERPL CALCULATED.3IONS-SCNC: 3 MMOL/L
APTT PPP: 30 SECONDS (ref 23–37)
BUN SERPL-MCNC: 15 MG/DL (ref 5–25)
CALCIUM SERPL-MCNC: 8.7 MG/DL (ref 8.4–10.2)
CHLORIDE SERPL-SCNC: 100 MMOL/L (ref 96–108)
CO2 SERPL-SCNC: 31 MMOL/L (ref 21–32)
CREAT SERPL-MCNC: 0.62 MG/DL (ref 0.6–1.3)
ERYTHROCYTE [DISTWIDTH] IN BLOOD BY AUTOMATED COUNT: 19.8 % (ref 11.6–15.1)
GFR SERPL CREATININE-BSD FRML MDRD: 98 ML/MIN/1.73SQ M
GLUCOSE SERPL-MCNC: 83 MG/DL (ref 65–140)
HCT VFR BLD AUTO: 41.3 % (ref 34.8–46.1)
HGB BLD-MCNC: 12.9 G/DL (ref 11.5–15.4)
INR PPP: 1.01 (ref 0.84–1.19)
MCH RBC QN AUTO: 28.9 PG (ref 26.8–34.3)
MCHC RBC AUTO-ENTMCNC: 31.2 G/DL (ref 31.4–37.4)
MCV RBC AUTO: 92 FL (ref 82–98)
PLATELET # BLD AUTO: 251 THOUSANDS/UL (ref 149–390)
PMV BLD AUTO: 9.1 FL (ref 8.9–12.7)
POTASSIUM SERPL-SCNC: 4.2 MMOL/L (ref 3.5–5.3)
PROTHROMBIN TIME: 13.4 SECONDS (ref 11.6–14.5)
RBC # BLD AUTO: 4.47 MILLION/UL (ref 3.81–5.12)
SODIUM SERPL-SCNC: 134 MMOL/L (ref 135–147)
VIT B12 SERPL-MCNC: 463 PG/ML (ref 180–914)
WBC # BLD AUTO: 7.43 THOUSAND/UL (ref 4.31–10.16)

## 2023-07-25 PROCEDURE — 99153 MOD SED SAME PHYS/QHP EA: CPT | Performed by: INTERNAL MEDICINE

## 2023-07-25 PROCEDURE — 93458 L HRT ARTERY/VENTRICLE ANGIO: CPT | Performed by: INTERNAL MEDICINE

## 2023-07-25 PROCEDURE — 99152 MOD SED SAME PHYS/QHP 5/>YRS: CPT | Performed by: INTERNAL MEDICINE

## 2023-07-25 PROCEDURE — 85027 COMPLETE CBC AUTOMATED: CPT | Performed by: PHYSICIAN ASSISTANT

## 2023-07-25 PROCEDURE — 99232 SBSQ HOSP IP/OBS MODERATE 35: CPT | Performed by: FAMILY MEDICINE

## 2023-07-25 PROCEDURE — NC001 PR NO CHARGE: Performed by: INTERNAL MEDICINE

## 2023-07-25 PROCEDURE — C1894 INTRO/SHEATH, NON-LASER: HCPCS | Performed by: INTERNAL MEDICINE

## 2023-07-25 PROCEDURE — C1769 GUIDE WIRE: HCPCS | Performed by: INTERNAL MEDICINE

## 2023-07-25 PROCEDURE — 80048 BASIC METABOLIC PNL TOTAL CA: CPT | Performed by: PHYSICIAN ASSISTANT

## 2023-07-25 PROCEDURE — 82607 VITAMIN B-12: CPT

## 2023-07-25 PROCEDURE — 99232 SBSQ HOSP IP/OBS MODERATE 35: CPT | Performed by: INTERNAL MEDICINE

## 2023-07-25 PROCEDURE — 85610 PROTHROMBIN TIME: CPT | Performed by: PHYSICIAN ASSISTANT

## 2023-07-25 PROCEDURE — 85730 THROMBOPLASTIN TIME PARTIAL: CPT | Performed by: PHYSICIAN ASSISTANT

## 2023-07-25 PROCEDURE — 84425 ASSAY OF VITAMIN B-1: CPT

## 2023-07-25 PROCEDURE — 94762 N-INVAS EAR/PLS OXIMTRY CONT: CPT

## 2023-07-25 RX ORDER — FENTANYL CITRATE 50 UG/ML
INJECTION, SOLUTION INTRAMUSCULAR; INTRAVENOUS CODE/TRAUMA/SEDATION MEDICATION
Status: DISCONTINUED | OUTPATIENT
Start: 2023-07-25 | End: 2023-07-25 | Stop reason: HOSPADM

## 2023-07-25 RX ORDER — VERAPAMIL HYDROCHLORIDE 2.5 MG/ML
INJECTION, SOLUTION INTRAVENOUS CODE/TRAUMA/SEDATION MEDICATION
Status: DISCONTINUED | OUTPATIENT
Start: 2023-07-25 | End: 2023-07-25 | Stop reason: HOSPADM

## 2023-07-25 RX ORDER — HEPARIN SODIUM 1000 [USP'U]/ML
INJECTION, SOLUTION INTRAVENOUS; SUBCUTANEOUS CODE/TRAUMA/SEDATION MEDICATION
Status: DISCONTINUED | OUTPATIENT
Start: 2023-07-25 | End: 2023-07-25 | Stop reason: HOSPADM

## 2023-07-25 RX ORDER — NITROGLYCERIN 20 MG/100ML
INJECTION INTRAVENOUS CODE/TRAUMA/SEDATION MEDICATION
Status: DISCONTINUED | OUTPATIENT
Start: 2023-07-25 | End: 2023-07-25 | Stop reason: HOSPADM

## 2023-07-25 RX ORDER — SODIUM CHLORIDE 9 MG/ML
75 INJECTION, SOLUTION INTRAVENOUS CONTINUOUS
Status: DISPENSED | OUTPATIENT
Start: 2023-07-25 | End: 2023-07-25

## 2023-07-25 RX ORDER — LIDOCAINE WITH 8.4% SOD BICARB 0.9%(10ML)
SYRINGE (ML) INJECTION CODE/TRAUMA/SEDATION MEDICATION
Status: DISCONTINUED | OUTPATIENT
Start: 2023-07-25 | End: 2023-07-25 | Stop reason: HOSPADM

## 2023-07-25 RX ORDER — MIDAZOLAM HYDROCHLORIDE 2 MG/2ML
INJECTION, SOLUTION INTRAMUSCULAR; INTRAVENOUS CODE/TRAUMA/SEDATION MEDICATION
Status: DISCONTINUED | OUTPATIENT
Start: 2023-07-25 | End: 2023-07-25 | Stop reason: HOSPADM

## 2023-07-25 RX ORDER — SODIUM CHLORIDE 9 MG/ML
INJECTION, SOLUTION INTRAVENOUS
Status: COMPLETED | OUTPATIENT
Start: 2023-07-25 | End: 2023-07-25

## 2023-07-25 RX ADMIN — NICOTINE 21 MG: 21 PATCH, EXTENDED RELEASE TRANSDERMAL at 08:10

## 2023-07-25 RX ADMIN — CYANOCOBALAMIN TAB 500 MCG 1000 MCG: 500 TAB at 08:10

## 2023-07-25 RX ADMIN — LEVOTHYROXINE SODIUM 100 MCG: 100 TABLET ORAL at 05:01

## 2023-07-25 RX ADMIN — ACETAMINOPHEN 650 MG: 325 TABLET ORAL at 11:06

## 2023-07-25 RX ADMIN — QUETIAPINE FUMARATE 25 MG: 25 TABLET ORAL at 22:26

## 2023-07-25 RX ADMIN — DOCUSATE SODIUM 100 MG: 100 CAPSULE, LIQUID FILLED ORAL at 08:10

## 2023-07-25 RX ADMIN — SODIUM CHLORIDE 75 ML/HR: 0.9 INJECTION, SOLUTION INTRAVENOUS at 10:32

## 2023-07-25 RX ADMIN — GABAPENTIN 400 MG: 400 CAPSULE ORAL at 08:10

## 2023-07-25 RX ADMIN — GABAPENTIN 400 MG: 400 CAPSULE ORAL at 17:33

## 2023-07-25 RX ADMIN — LORATADINE 10 MG: 10 TABLET ORAL at 08:10

## 2023-07-25 RX ADMIN — FLUTICASONE PROPIONATE 1 SPRAY: 50 SPRAY, METERED NASAL at 08:11

## 2023-07-25 RX ADMIN — DOCUSATE SODIUM 100 MG: 100 CAPSULE, LIQUID FILLED ORAL at 17:33

## 2023-07-25 RX ADMIN — ATORVASTATIN CALCIUM 40 MG: 40 TABLET, FILM COATED ORAL at 16:25

## 2023-07-25 NOTE — QUICK NOTE
Discussed with patient results 7/25/23 via catheterization. Cardiac catheterization noted nonobstructive coronary artery disease with torturous arteries of distal branches, mid RCA lesion 40% stenosis; LVEF 60%. Confirm with patient that she had an appointment with Dr. Sraah Bauer, EP Cardiology, on 8/30/23 12 PM for evaluation for history of SVT. Recommended continue current Rx regimen with Lopressor 12.5 mg twice daily and follow-up with scheduled appointment on 8/30/23 at 12PM.  Patient reported understanding of plan and was in remission with treatment plan and follow-up. No further cardiology work-up at this time.

## 2023-07-25 NOTE — NURSING NOTE
Pt back in bed by 1030 from cath lab. 1cc removed, bleeding occurred, 1cc of air put back. 1cc removed q15 minutes. Band removed once all 10cc of air was removed. No bleeding and good pulses.

## 2023-07-25 NOTE — CASE MANAGEMENT
Case Management Discharge Planning Note    Patient name Zaida Duke  Location [address] [facility] 417/[address] MRN [id number]  : 1960 Date 2023       Current Admission Date: 2023  Current Admission Diagnosis:SVT (supraventricular tachycardia) [facility])   Patient Active Problem List    Diagnosis Date Noted   • Hypotension 2023   • Hypomagnesemia 2023   • Seborrheic keratoses, inflamed 2023   • Hypothyroid 2023   • SVT (supraventricular tachycardia) ([address]) 2023   • Depression with anxiety 2023   • Nicotine abuse 2023   • Hypokalemia 2023   • Anemia 2023   • Chronic low back pain 2023   • Chronic obstructive pulmonary disease ([address]) 2019      LOS (days): 2  Geometric Mean LOS (GMLOS) (days): 2.30  Days to GMLOS:0.4     OBJECTIVE:  Risk of Unplanned Readmission Score: 16.53         Current admission status: Inpatient   Preferred Pharmacy:   [address] [place] [id number]  Phone: [phone] Fax: [phone]    [address] [place] [address]  Phone: [phone] Fax: [phone]    Primary Care Provider: Ayse Schofield MD    Primary Insurance: [facility]  Secondary Insurance:     DISCHARGE DETAILS:    DME Referral Provided  Referral made for DME?: Yes  DME referral completed for the following items[de-identified] Home Oxygen concentrator, Portable Oxygen concentrator  DME Supplier Name[de-identified] [employer]    Other Referral/Resources/Interventions Provided:  Referral Comments: CM made aware patient will require home O2 eval. Per patient's home O2 eval, patient qualifies for home O2. CM placed referral via [place] to [address] [place] [address] for O2. Once order processed, CM delivered [employer] POC to patient's room.  Delivery receipt signed by patient, a copy provided to patient and original placed in scan bin for chart.

## 2023-07-25 NOTE — QUICK NOTE
Interventional cardiology note    Patient underwent cardiac catheterization by right radial artery. Cardiac catheterization shows normal LV systolic function. She has tortuous arteries. She has a mild systolic plaque causing about 35 to 40% stenosis in RCA. And 20-25% in left circulation. No focal stenosis. Continue medical Rx with statins. Consider work-up for noncardiac causes of chest pain. No complication full report to follow.

## 2023-07-25 NOTE — PROGRESS NOTES
Daily Progress Note - [facility] [address] [facility] 63 y.o. female MRN: [id number]  Unit/Bed#: 913 [facility] 883-42 Encounter: [id number]  Admitting Physician: Alfredito Perez MD   PCP: Lyly Saldivar MD  Date of Admission:  7/23/2023  4:45 PM    Assessment and Plan    * SVT (supraventricular tachycardia) ([address])  Assessment & Plan    Pt presenting on arrival with HR of 198 bpm and hypotensive. Given 6 mg of Adenosine, IV, once in the ED. Converted to NSR. Echo from 6/1/23 showed EF of 55% and no HF. Currently on Metoprolol 12.5 mg, PO, Q12H at home. Refers compliance with medication. Prior episode was in May 2023 for which she was admitted to [address]. · Admit to STEPDOWN 2 for continuous cardiac monitoring (telemetry)   · Cardiology consult  · Patient will receive cardiac cath on 07/25. · Will need outpatient EP  · Continue Metoprolol 12.5 mg, PO, Q12H but hold for SBP < 110 mmHg  · Cardiac diet, low fat, low cholesterol, no caffeine  · Avoid stimulants  · FU on CXR   · Replete electrolytes   · Can try non pharmacological measures if recurrent SVT like Valsalva maneuver, cold water to the face and ice pack application   · If persistent, consult ICU nurse/rapid response for adenosine administration     Hypomagnesemia  Assessment & Plan    Mag 1.6 on admission. Given 2 g of mag, IV, once in ED. · Monitor Magnesium levels  · Maintain Mag at 2 or above  · Magnesium levels at AM     Hypotension  Assessment & Plan    Pt's BP on arrival was 73/33 mmHg with HR of 198. Pt was given 2 boluses of 1 L of NS. Last BP 99/66 mmHg. · Monitor BP   · Hold Metoprolol 12.5 mg, PO, Q12H for SBP <110 mmHg    Hypothyroid  Assessment & Plan    Lab Results   Component Value Date    YNY1WWJIDIGX 24.901 (H) 07/23/2023     Pt currently on Levothyroxine 88 mcg, PO, QD. Refers compliance with medications. TSH has improved from prior on 6/1/23 that was 143.129, but continues to be elevated.      · Increase dosage of Levothyroxine from 88 mcg to 100 mcg, PO, QD   · Repeat TSH in 6 weeks          Anemia  Assessment & Plan    Hgb 14.3 on admission. Anemia has resolved. Currently on Ferrous sulfate 325 mg, PO, QD. No complaints of constipation. · Continue iron supplementation   · Transition to every other day for 6 more months    Hypokalemia  Assessment & Plan    K 3.2 on admission. Was given 40 mEq of KDUR and 20 mEq of IV KCL. · Will give 20 mEq of KDUR extra  · Maintain K at 4 or above  · CMP at AM     Nicotine abuse  Assessment & Plan    Pt is current smoker. Refers smoking 1 PPD. · Nicotine patch 21 mg, 1 patch, transdermal, QD   · Tobacco cessation education       Depression with anxiety  Assessment & Plan    Stable. Pt refers not feeling depressed. Mentions occasional episodes of anxiety. On Hydroxyzine 25 mg, PO, TID PRN and Quetiapine 25 mg, PO, QD HS PRN. · Continue home medications    Chronic obstructive pulmonary disease (HCC)  Assessment & Plan    Stable. SpO2 on admission was 94%. Placed on 2 L/min of O2 per NC with saturations ranging from %. Only on Albuterol 90 mcg/act inhaler, 2 puffs, Inhalation, Q6H PRN at home. · Continue oxygen supplementation as needed  · Monitor SpO2  · Hold albuterol in the setting of tachycardia   · Pending results of home O2 study      VTE Pharmacologic Prophylaxis: VTE Score: 2 Low Risk (Score 0-2) - Encourage Ambulation. Patient Centered Rounds: I have performed bedside rounds with nursing staff today. Discussions with Specialists or Other Care Team Provider: Cardiology    Education and Discussions with Family / Patient:  Patient Information Sharing: With the consent of Roby Carnes , their loved ones, Shwetha Dockery, were notified today by inpatient team of the patient’s condition and current plan. All questions answered.     Current Length of Stay: 2 day(s)    Current Patient Status: Inpatient   Certification Statement: The patient will continue to require additional inpatient hospital stay due to Will need to complete cardiac catheterization    Discharge Plan: Can discharge based off of cardiology recommendations after catheterization    Code Status: Level 1 - Full Code    Subjective:   Patient seen and examined at bedside. Patient had no acute events overnight. Patient reported she is nervous about the findings of the cardiac cath later today. Patient also reported that she is still having some numbness and tingling in bilateral upper and lower extremities. Patient reported this been a chronic issue for her which will occur intermittently and seemingly at random times. Patient denies any fever, chills, nausea, vomiting, lightheadedness, dizziness, blurry vision, chest pain, palpitations, shortness of breath. Objective     Objective:   Vitals:   Temp (24hrs), Av.2 °F (36.8 °C), Min:97.8 °F (36.6 °C), Max:98.8 °F (37.1 °C)    Temp:  [97.8 °F (36.6 °C)-98.8 °F (37.1 °C)] 97.8 °F (36.6 °C)  HR:  [69-82] 82  Resp:  [15-18] 15  BP: ()/(60-69) 102/63  SpO2:  [85 %-100 %] 92 %  Body mass index is 21.87 kg/m². Input and Output Summary (last 24 hours):     No intake or output data in the 24 hours ending 23 0911    Physical Exam:   Physical Exam  Vitals reviewed. Constitutional:       General: She is not in acute distress. Appearance: Normal appearance. She is not ill-appearing. Eyes:      General: No scleral icterus. Conjunctiva/sclera: Conjunctivae normal.   Cardiovascular:      Rate and Rhythm: Normal rate and regular rhythm. Pulses: Normal pulses. Heart sounds: Murmur heard. Pulmonary:      Effort: Pulmonary effort is normal. No respiratory distress. Breath sounds: Normal breath sounds. Abdominal:      General: Bowel sounds are normal. There is no distension. Palpations: Abdomen is soft. Tenderness: There is no abdominal tenderness.  There is no right CVA tenderness, left CVA tenderness, guarding or rebound. Musculoskeletal:      Right lower leg: No edema. Left lower leg: No edema. Skin:     General: Skin is warm and dry. Capillary Refill: Capillary refill takes less than 2 seconds. Neurological:      Mental Status: She is alert and oriented to person, place, and time. Mental status is at baseline. Psychiatric:         Mood and Affect: Mood normal.         Behavior: Behavior normal.       Additional Data:     Labs:  Results from last 7 days   Lab Units 07/25/23  0504 07/23/23  2203 07/23/23  1710   WBC Thousand/uL 7.43   < > 10.74*   HEMOGLOBIN g/dL 12.9   < > 14.3   HEMATOCRIT % 41.3   < > 44.6   PLATELETS Thousands/uL 251   < > 341   NEUTROS PCT %  --   --  62   LYMPHS PCT %  --   --  28   MONOS PCT %  --   --  6   EOS PCT %  --   --  3    < > = values in this interval not displayed. Results from last 7 days   Lab Units 07/25/23  0504 07/24/23  0531   POTASSIUM mmol/L 4.2 5.0   CHLORIDE mmol/L 100 107   CO2 mmol/L 31 30   BUN mg/dL 15 8   CREATININE mg/dL 0.62 0.72   CALCIUM mg/dL 8.7 8.5   ALK PHOS U/L  --  42   ALT U/L  --  8   AST U/L  --  13     Results from last 7 days   Lab Units 07/25/23  0504   INR  1.01               * I Have Reviewed All Lab Data Listed Above. * Additional Pertinent Lab Tests Reviewed:  [address] [facility] Admission Reviewed    Imaging:    Imaging Reports Reviewed Today Include:   XR chest pa & lateral    (Results Pending)     Imaging Personally Reviewed by Myself Includes: Please see above    Recent Cultures (last 7 days):           Last 24 Hours Medication List:   Current Facility-Administered Medications   Medication Dose Route Frequency Provider Last Rate   • acetaminophen  650 mg Oral Q6H PRN Claudette Weaver MD     • atorvastatin  40 mg Oral Daily With Shreya Cheung MD     • calcium carbonate  1,000 mg Oral Daily PRN Blair Escamilla MD     • vitamin B-12  1,000 mcg Oral Daily Blair Escamilla MD     • docusate sodium  100 mg Oral BID Chaparro Toussaint MD     • ferrous sulfate  325 mg Oral Every Other Day Chaparro Toussaint MD     • fluticasone  1 spray Nasal Daily Chaparro Toussaint MD     • gabapentin  400 mg Oral BID Chaparro Toussaint MD     • hydrOXYzine HCL  25 mg Oral TID PRN Chaparro Toussaint MD     • levothyroxine  100 mcg Oral Early Morning Chaparro Toussaint MD     • loratadine  10 mg Oral Daily Chaparro Toussaint MD     • metoprolol tartrate  12.5 mg Oral Q12H [address] Chaparro Toussaint MD     • nicotine  21 mg Transdermal Daily Chaparro Toussaint MD     • ondansetron  4 mg Intravenous Q6H PRN Chaparro Toussaint MD     • QUEtiapine  25 mg Oral HS PRN Chaparro Toussaint MD               ** Please Note: Dictation voice to text software may have been used in the creation of this document.  **    Quentin Chatterjee MD  07/25/23  9:11 AM

## 2023-07-25 NOTE — PLAN OF CARE
Problem: Potential for Falls  Goal: Patient will remain free of falls  Description: INTERVENTIONS:  - Educate patient/family on patient safety including physical limitations  - Instruct patient to call for assistance with activity   - Consult OT/PT to assist with strengthening/mobility   - Keep Call bell within reach  - Keep bed low and locked with side rails adjusted as appropriate  - Keep care items and personal belongings within reach  - Initiate and maintain comfort rounds  - Make Fall Risk Sign visible to staff  - Offer Toileting every 2 Hours, in advance of need  - Initiate/Maintain bed alarm  - Obtain necessary fall risk management equipment: yellow bracelet  - Apply yellow socks and bracelet for high fall risk patients  - Consider moving patient to room near nurses station  Outcome: Progressing     Problem: CARDIOVASCULAR - ADULT  Goal: Maintains optimal cardiac output and hemodynamic stability  Description: INTERVENTIONS:  - Monitor I/O, vital signs and rhythm  - Monitor for S/S and trends of decreased cardiac output  - Administer and titrate ordered vasoactive medications to optimize hemodynamic stability  - Assess quality of pulses, skin color and temperature  - Assess for signs of decreased coronary artery perfusion  - Instruct patient to report change in severity of symptoms  Outcome: Progressing  Goal: Absence of cardiac dysrhythmias or at baseline rhythm  Description: INTERVENTIONS:  - Continuous cardiac monitoring, vital signs, obtain 12 lead EKG if ordered  - Administer antiarrhythmic and heart rate control medications as ordered  - Monitor electrolytes and administer replacement therapy as ordered  Outcome: Progressing     Problem: RESPIRATORY - ADULT  Goal: Achieves optimal ventilation and oxygenation  Description: INTERVENTIONS:  - Assess for changes in respiratory status  - Assess for changes in mentation and behavior  - Position to facilitate oxygenation and minimize respiratory effort  - Oxygen administered by appropriate delivery if ordered  - Initiate smoking cessation education as indicated  - Encourage broncho-pulmonary hygiene including cough, deep breathe, Incentive Spirometry  - Assess the need for suctioning and aspirate as needed  - Assess and instruct to report SOB or any respiratory difficulty  - Respiratory Therapy support as indicated  Outcome: Progressing

## 2023-07-25 NOTE — RESPIRATORY THERAPY NOTE
Home Oxygen Qualifying Test     Patient name: Mp Webster        : 1960   Date of Test:  2023  Diagnosis:    Home Oxygen Test:    **Medicare Guidelines require item(s) 1-5 on all ambulatory patients or 1 and 2 on non-ambulatory patients. 1. Baseline SPO2 on Room Air at rest 84 %   a. If <= 88% on Room Air add O2 via NC to obtain SpO2 >=88%. If LPM needed, document LPM  needed to reach =>88%    2. SPO2 during exertion on Room Air 81  %  a. During exertion monitor SPO2. If SPO2 increases >=89%, do not add supplemental oxygen    3. SPO2 on Oxygen at Rest 94 % at 2 LPM    4. SPO2 during exertion on Oxygen 94 % at 3 LPM    5. Test performed during exertion activity. Walking     [x]  Supplemental Home Oxygen is indicated. []  Client does not qualify for home oxygen.     Respiratory Additional Notes-     Mireya Valdivia, RT

## 2023-07-25 NOTE — PROGRESS NOTES
Progress Note - Cardiology   [facility]     [facility]  [facility] y.o. female MRN: [id number]  : 1960  Unit/Bed#: [address] [place] [address] Encounter: [id number]    Assessment and Plan:   1. Supraventricular tachycardia.      - Presented on 23 for evaluation for headache, lightheadedness, dizziness, and facial flushness. On presentation, patient markedly hypotensive (lowest BP 73/33) noted to be in SVT in ED.   - 23 1645 hrs EKG: Supraventricular tachycardia, rate 195 bpm.    - Patient was given 1 dose of adenosine 6 mg IV and converted to sinus rhythm. - Repeat EKG 23 1658 hrs EKG: Sinus rhythm, 79 bpm; noted PVCs and PACs. - Continue Lopressor 12.5 mg twice daily.   - Monitor electrolytes. Replete potassium above 4 and magnesium above 2.    - - overnight pulse oximetry: AHI 2, normal.  - 23 TTE: LVEF 55%. Systolic function normal.  Wall motion is normal.  Diastolic function normal.  Right ventricular systolic function normal.  Bilateral atria normal in size. - 23 nuclear stress test: Equivocal study. Small apical defect possible artifact. No major reversible perfusion defects noted with stress. Lower suspicion for balanced ischemia with TID of 1.1 and EF 77%. - Scheduled for cardiac catheterization today, 23.     2. Elevated troponin.      - 23 hs troponin: <2 (0hrs), 7 (2hrs), 81 (4hrs). - Likely non ischemic myocardial injury secondary to SVT and hypotension on presentation.   - Patient denies chest pain. - 23 nuclear stress test: Equivocal study. Small apical defect possible artifact. No major reversible perfusion defects noted with stress. Lower suspicion for balanced ischemia with TID of 1.1 and EF 77%     3. Hypothyroidism.      - 23 TSH: 24.901.   - 23 Free T4: 1.13.   - Currently on levothyroxine 100 mcg daily.   - Care per primary team.      4.  Hyperlipidemia.      - 23 lipid panel: cholesterol 256, triglycerides 88, HDL 71, .   - Continue Lipitor 40 mg daily.     5. COPD.      - No wheezing noted on auscultation.  - Currently on 1.5 LPM of supplemental oxygen. - Care per primary team.      6. Depression/anxiety.      - Currently on Seroquel 25 mg daily at bedtime prn and atarax 25 mg three times daily prn.    - Care per primary team.     Subjective / Objective:           Patient scheduled for cardiac catheterization today. All questions and concerns regarding procedure answered, patient is agreeable to proceed with cardiac catheterization. Review of vital signs note episodes of hypoxia over the past 24 hours. Patient does endorse some shortness of breath, worse than baseline. She currently denies chest pain, palpitations, lightheadedness, dizziness, nausea, or vomiting. Vitals: Blood pressure 102/63, pulse 82, temperature 97.8 °F (36.6 °C), resp. rate 15, height 5' (1.524 m), weight 50.8 kg (112 lb), SpO2 92 %, not currently breastfeeding. Vitals:    07/24/23 0531 07/25/23 0556   Weight: 50.7 kg (111 lb 12.8 oz) 50.8 kg (112 lb)     Body mass index is 21.87 kg/m². BP Readings from Last 3 Encounters:   07/25/23 102/63   07/03/23 124/72   06/13/23 115/55     Orthostatic Blood Pressures    Flowsheet Row Most Recent Value   Blood Pressure 102/63 filed at 07/25/2023 0728   Patient Position - Orthostatic VS Sitting filed at 07/24/2023 0936        I/O       07/23 0701  07/24 0700 07/24 0701  07/25 0700 07/25 0701 07/26 0700    IV Piggyback 2050      Total Intake(mL/kg) 2050 (40.4)      Urine (mL/kg/hr) 600      Total Output 600      Net +1450                 Invasive Devices     Peripheral Intravenous Line  Duration           Peripheral IV 07/23/23 Distal;Right;Upper;Ventral (anterior) Arm 1 day    Peripheral IV 07/23/23 Right;Ventral (anterior) Forearm 1 day                No intake or output data in the 24 hours ending 07/25/23 3492      Physical Exam:   Physical Exam  Vitals reviewed.    Constitutional: General: She is not in acute distress. Cardiovascular:      Rate and Rhythm: Normal rate and regular rhythm. Pulses: Normal pulses. Heart sounds: Murmur heard. Pulmonary:      Effort: Pulmonary effort is normal. No respiratory distress. Breath sounds: Decreased breath sounds present. Abdominal:      General: Abdomen is flat. There is no distension. Palpations: Abdomen is soft. Tenderness: There is no abdominal tenderness. Musculoskeletal:      Right lower leg: No edema. Left lower leg: No edema. Skin:     General: Skin is warm and dry. Coloration: Skin is pale. Neurological:      Mental Status: She is alert.        Medications/ Allergies:     Current Facility-Administered Medications   Medication Dose Route Frequency Provider Last Rate   • acetaminophen  650 mg Oral Q6H PRN Chasidy Londono MD     • atorvastatin  40 mg Oral Daily With Naveen Lerner MD     • calcium carbonate  1,000 mg Oral Daily PRN Cassi Sauceda MD     • vitamin B-12  1,000 mcg Oral Daily Cassi Sauceda MD     • docusate sodium  100 mg Oral BID Cassi Sauceda MD     • ferrous sulfate  325 mg Oral Every Other Day Cassi Sauceda MD     • fluticasone  1 spray Nasal Daily Cassi Sauceda MD     • gabapentin  400 mg Oral BID Cassi Sauceda MD     • hydrOXYzine HCL  25 mg Oral TID PRN Cassi Sauceda MD     • levothyroxine  100 mcg Oral Early Morning Cassi Sauceda MD     • loratadine  10 mg Oral Daily Cassi Sauceda MD     • metoprolol tartrate  12.5 mg Oral Q12H [address], MD     • nicotine  21 mg Transdermal Daily Cassi Sauceda MD     • ondansetron  4 mg Intravenous Q6H PRN Cassi Sauceda MD     • QUEtiapine  25 mg Oral HS PRN Cassi Sauceda MD       acetaminophen, 650 mg, Q6H PRN  calcium carbonate, 1,000 mg, Daily PRN  hydrOXYzine HCL, 25 mg, TID PRN  ondansetron, 4 mg, Q6H PRN  QUEtiapine, 25 mg, HS PRN      Allergies   Allergen Reactions   • Other Allergic Rhinitis     seasonal       VTE Pharmacologic Prophylaxis: none. Labs:   Troponins:  Results from last 7 days   Lab Units 07/23/23 2203 07/23/23  1941   HSTNI D2 ng/L  --  >5   HSTNI D4 ng/L >79*  --          CBC with diff:  Results from last 7 days   Lab Units 07/25/23  0504 07/24/23  0531 07/23/23  2203 07/23/23  1710   WBC Thousand/uL 7.43 7.19  --  10.74*   HEMOGLOBIN g/dL 12.9 12.6  --  14.3   HEMATOCRIT % 41.3 40.3  --  44.6   MCV fL 92 94  --  92   PLATELETS Thousands/uL 251 271 268 341   RBC Million/uL 4.47 4.29  --  4.86   MCH pg 28.9 29.4  --  29.4   MCHC g/dL 31.2* 31.3*  --  32.1   RDW % 19.8* 20.5*  --  20.2*   MPV fL 9.1 9.3 9.3 9.6   NRBC AUTO /100 WBCs  --   --   --  0       CMP:  Results from last 7 days   Lab Units 07/25/23  0504 07/24/23  0531 07/23/23  1753   SODIUM mmol/L 134* 139 136   POTASSIUM mmol/L 4.2 5.0 3.2*   CHLORIDE mmol/L 100 107 104   CO2 mmol/L 31 30 24   ANION GAP mmol/L 3 2 8   BUN mg/dL 15 8 10   CREATININE mg/dL 0.62 0.72 0.68   CALCIUM mg/dL 8.7 8.5 7.7*   AST U/L  --  13 14   ALT U/L  --  8 9   ALK PHOS U/L  --  42 38   TOTAL PROTEIN g/dL  --  6.0* 5.4*   ALBUMIN g/dL  --  3.6 3.3*   TOTAL BILIRUBIN mg/dL  --  0.35 0.33   EGFR ml/min/1.73sq m 98 91 95       Magnesium:  Results from last 7 days   Lab Units 07/24/23  0531 07/23/23  1753   MAGNESIUM mg/dL 2.5 1.6*     Coags:  Results from last 7 days   Lab Units 07/25/23  0504 07/23/23  2203   PTT seconds 30 31   INR  1.01 1.06     TSH:  Results from last 7 days   Lab Units 07/23/23  1753   TSH 3RD GENERATON uIU/mL 24.901*       Imaging & Testing   I have personally reviewed pertinent reports. No results found. EKG / Monitor: Personally reviewed. Telemetry reviewed: Currently sinus rhythm, 81 bpm.  No evidence of SVT or tachy arrhythmia on telemetry.     Cardiac testing:   Echo complete     Results date: 6/01/23     Left Ventricle Left ventricular cavity size is normal. Wall thickness is normal. The left ventricular ejection fraction is 55% by visual estimation. Systolic function is normal.  Wall motion is normal. Diastolic function is normal.      Right Ventricle Right ventricular cavity size is normal. Systolic function is normal. Normal tricuspid annular plane systolic excursion (TAPSE) > 1.7 cm. Wall thickness is normal.      Left Atrium The atrium is normal in size. Right Atrium The atrium is normal in size. Aortic Valve The aortic valve is trileaflet. The leaflets are mildly thickened. The leaflets are not calcified. The leaflets exhibit normal mobility. There is no evidence of regurgitation. The aortic valve has no significant stenosis. Mitral Valve There is mild thickening. There is mild annular calcification. There is trace regurgitation. There is no evidence of stenosis. Tricuspid Valve Tricuspid valve structure is normal. There is trace regurgitation. There is no evidence of stenosis. Pulmonic Valve Pulmonic valve structure is normal. There is no evidence of regurgitation. There is no evidence of stenosis. Ascending Aorta The aortic root is normal in size. IVC/SVC The inferior vena cava is normal in size. Pericardium There is no pericardial effusion. The pericardium is normal in appearance.            Nuclear stress test     Results date: 6/02/23     •  Stress Function: Left ventricular function post-stress is normal. Stress ejection fraction is 77 %. •  Perfusion: There is a left ventricular perfusion defect that is small in size present in the apex location(s) that is predominantly fixed. •  Stress Combined Conclusion: Left ventricular perfusion is equivocal.  •  Perfusion Defect Conclusion: The stress/rest perfusion ratio is 1.10 . There is no evidence of transient ischemic dilation (TID). •  Stress ECG: The stress ECG is equivocal for ischemia after vasodilation and low level exercise.     Equivocal study. Small apical defect possible artifact. No major reversible perfusion defects noted with stress.  Lower suspicion for balanced ischemia with TID of 1.1 and EF 77%                Lenard Doyle PA-C

## 2023-07-26 ENCOUNTER — APPOINTMENT (INPATIENT)
Dept: RADIOLOGY | Facility: HOSPITAL | Age: 63
DRG: 286 | End: 2023-07-26
Payer: COMMERCIAL

## 2023-07-26 LAB
ALBUMIN SERPL BCP-MCNC: 3.3 G/DL (ref 3.5–5)
ALP SERPL-CCNC: 40 U/L (ref 34–104)
ALT SERPL W P-5'-P-CCNC: 7 U/L (ref 7–52)
ANION GAP SERPL CALCULATED.3IONS-SCNC: 4 MMOL/L
AST SERPL W P-5'-P-CCNC: 10 U/L (ref 13–39)
BASOPHILS # BLD AUTO: 0.07 THOUSANDS/ÂΜL (ref 0–0.1)
BASOPHILS NFR BLD AUTO: 1 % (ref 0–1)
BILIRUB SERPL-MCNC: 0.29 MG/DL (ref 0.2–1)
BUN SERPL-MCNC: 17 MG/DL (ref 5–25)
CALCIUM ALBUM COR SERPL-MCNC: 9.3 MG/DL (ref 8.3–10.1)
CALCIUM SERPL-MCNC: 8.7 MG/DL (ref 8.4–10.2)
CHLORIDE SERPL-SCNC: 103 MMOL/L (ref 96–108)
CO2 SERPL-SCNC: 28 MMOL/L (ref 21–32)
CREAT SERPL-MCNC: 0.52 MG/DL (ref 0.6–1.3)
EOSINOPHIL # BLD AUTO: 0.34 THOUSAND/ÂΜL (ref 0–0.61)
EOSINOPHIL NFR BLD AUTO: 3 % (ref 0–6)
ERYTHROCYTE [DISTWIDTH] IN BLOOD BY AUTOMATED COUNT: 19.6 % (ref 11.6–15.1)
GFR SERPL CREATININE-BSD FRML MDRD: 104 ML/MIN/1.73SQ M
GLUCOSE SERPL-MCNC: 103 MG/DL (ref 65–140)
HCT VFR BLD AUTO: 38.2 % (ref 34.8–46.1)
HGB BLD-MCNC: 12.2 G/DL (ref 11.5–15.4)
IMM GRANULOCYTES # BLD AUTO: 0.03 THOUSAND/UL (ref 0–0.2)
IMM GRANULOCYTES NFR BLD AUTO: 0 % (ref 0–2)
LYMPHOCYTES # BLD AUTO: 1.05 THOUSANDS/ÂΜL (ref 0.6–4.47)
LYMPHOCYTES NFR BLD AUTO: 9 % (ref 14–44)
MAGNESIUM SERPL-MCNC: 1.7 MG/DL (ref 1.9–2.7)
MCH RBC QN AUTO: 29.3 PG (ref 26.8–34.3)
MCHC RBC AUTO-ENTMCNC: 31.9 G/DL (ref 31.4–37.4)
MCV RBC AUTO: 92 FL (ref 82–98)
MONOCYTES # BLD AUTO: 0.66 THOUSAND/ÂΜL (ref 0.17–1.22)
MONOCYTES NFR BLD AUTO: 6 % (ref 4–12)
NEUTROPHILS # BLD AUTO: 9.86 THOUSANDS/ÂΜL (ref 1.85–7.62)
NEUTS SEG NFR BLD AUTO: 81 % (ref 43–75)
NRBC BLD AUTO-RTO: 0 /100 WBCS
PLATELET # BLD AUTO: 231 THOUSANDS/UL (ref 149–390)
PMV BLD AUTO: 9.3 FL (ref 8.9–12.7)
POTASSIUM SERPL-SCNC: 4 MMOL/L (ref 3.5–5.3)
PROT SERPL-MCNC: 5.8 G/DL (ref 6.4–8.4)
RBC # BLD AUTO: 4.16 MILLION/UL (ref 3.81–5.12)
SODIUM SERPL-SCNC: 135 MMOL/L (ref 135–147)
WBC # BLD AUTO: 12.01 THOUSAND/UL (ref 4.31–10.16)

## 2023-07-26 PROCEDURE — 83735 ASSAY OF MAGNESIUM: CPT

## 2023-07-26 PROCEDURE — 97530 THERAPEUTIC ACTIVITIES: CPT

## 2023-07-26 PROCEDURE — 99223 1ST HOSP IP/OBS HIGH 75: CPT | Performed by: INTERNAL MEDICINE

## 2023-07-26 PROCEDURE — 71250 CT THORAX DX C-: CPT

## 2023-07-26 PROCEDURE — 94640 AIRWAY INHALATION TREATMENT: CPT

## 2023-07-26 PROCEDURE — 80053 COMPREHEN METABOLIC PANEL: CPT

## 2023-07-26 PROCEDURE — G1004 CDSM NDSC: HCPCS

## 2023-07-26 PROCEDURE — 99232 SBSQ HOSP IP/OBS MODERATE 35: CPT | Performed by: FAMILY MEDICINE

## 2023-07-26 PROCEDURE — 85025 COMPLETE CBC W/AUTO DIFF WBC: CPT

## 2023-07-26 PROCEDURE — 94760 N-INVAS EAR/PLS OXIMETRY 1: CPT

## 2023-07-26 RX ORDER — ALBUTEROL SULFATE 2.5 MG/3ML
2.5 SOLUTION RESPIRATORY (INHALATION)
Status: DISCONTINUED | OUTPATIENT
Start: 2023-07-26 | End: 2023-07-27 | Stop reason: HOSPADM

## 2023-07-26 RX ORDER — METHYLPREDNISOLONE SODIUM SUCCINATE 40 MG/ML
40 INJECTION, POWDER, LYOPHILIZED, FOR SOLUTION INTRAMUSCULAR; INTRAVENOUS DAILY
Status: DISCONTINUED | OUTPATIENT
Start: 2023-07-26 | End: 2023-07-27 | Stop reason: HOSPADM

## 2023-07-26 RX ORDER — MAGNESIUM SULFATE HEPTAHYDRATE 40 MG/ML
2 INJECTION, SOLUTION INTRAVENOUS ONCE
Status: COMPLETED | OUTPATIENT
Start: 2023-07-26 | End: 2023-07-26

## 2023-07-26 RX ORDER — ALBUTEROL SULFATE 2.5 MG/3ML
2.5 SOLUTION RESPIRATORY (INHALATION) EVERY 6 HOURS PRN
Status: DISCONTINUED | OUTPATIENT
Start: 2023-07-26 | End: 2023-07-26

## 2023-07-26 RX ADMIN — GABAPENTIN 400 MG: 400 CAPSULE ORAL at 08:46

## 2023-07-26 RX ADMIN — NICOTINE 21 MG: 21 PATCH, EXTENDED RELEASE TRANSDERMAL at 08:47

## 2023-07-26 RX ADMIN — ALBUTEROL SULFATE 2.5 MG: 2.5 SOLUTION RESPIRATORY (INHALATION) at 14:50

## 2023-07-26 RX ADMIN — METHYLPREDNISOLONE SODIUM SUCCINATE 40 MG: 40 INJECTION, POWDER, FOR SOLUTION INTRAMUSCULAR; INTRAVENOUS at 17:08

## 2023-07-26 RX ADMIN — Medication 12.5 MG: at 08:47

## 2023-07-26 RX ADMIN — GABAPENTIN 400 MG: 400 CAPSULE ORAL at 17:08

## 2023-07-26 RX ADMIN — ATORVASTATIN CALCIUM 40 MG: 40 TABLET, FILM COATED ORAL at 17:08

## 2023-07-26 RX ADMIN — FERROUS SULFATE TAB 325 MG (65 MG ELEMENTAL FE) 325 MG: 325 (65 FE) TAB at 08:47

## 2023-07-26 RX ADMIN — ALBUTEROL SULFATE 2.5 MG: 2.5 SOLUTION RESPIRATORY (INHALATION) at 20:29

## 2023-07-26 RX ADMIN — ONDANSETRON 4 MG: 2 INJECTION INTRAMUSCULAR; INTRAVENOUS at 10:08

## 2023-07-26 RX ADMIN — FLUTICASONE PROPIONATE 1 SPRAY: 50 SPRAY, METERED NASAL at 08:46

## 2023-07-26 RX ADMIN — MAGNESIUM SULFATE HEPTAHYDRATE 2 G: 40 INJECTION, SOLUTION INTRAVENOUS at 17:09

## 2023-07-26 RX ADMIN — DOCUSATE SODIUM 100 MG: 100 CAPSULE, LIQUID FILLED ORAL at 17:08

## 2023-07-26 RX ADMIN — LEVOTHYROXINE SODIUM 100 MCG: 100 TABLET ORAL at 05:35

## 2023-07-26 RX ADMIN — CYANOCOBALAMIN TAB 500 MCG 1000 MCG: 500 TAB at 08:47

## 2023-07-26 RX ADMIN — LORATADINE 10 MG: 10 TABLET ORAL at 08:47

## 2023-07-26 RX ADMIN — UMECLIDINIUM 1 PUFF: 62.5 AEROSOL, POWDER ORAL at 17:08

## 2023-07-26 RX ADMIN — QUETIAPINE FUMARATE 25 MG: 25 TABLET ORAL at 21:22

## 2023-07-26 RX ADMIN — DOCUSATE SODIUM 100 MG: 100 CAPSULE, LIQUID FILLED ORAL at 08:46

## 2023-07-26 RX ADMIN — ACETAMINOPHEN 650 MG: 325 TABLET ORAL at 21:22

## 2023-07-26 NOTE — PROGRESS NOTES
Daily Progress Note - [facility] [address] [facility] 63 y.o. female MRN: [id number]  Unit/Bed#: 913 [facility] 319-77 Encounter: [id number]  Admitting Physician: Everardo Salinas MD   PCP: Paul Coronel MD  Date of Admission:  7/23/2023  4:45 PM    Assessment and Plan    * SVT (supraventricular tachycardia) ([address])  Assessment & Plan  Pt presenting on arrival with HR of 198 bpm and hypotensive. Given 6 mg of Adenosine, IV, once in the ED. Converted to NSR. Echo from 6/1/23 showed EF of 55% and no HF. Currently on Metoprolol 12.5 mg, PO, Q12H at home. Refers compliance with medication. Prior episode was in May 2023 for which she was admitted to [address]. · Continuous cardiac monitoring (telemetry)   · Cardiology consult  · Cardiac catheterization on 7/25 showed Mid RCA lesion is 40% stenosed and mild calcification of proximal and mid RCA. · EF of 60%  · Will need outpatient EP, has appointment on 8/30  · Continue Metoprolol 12.5 mg, PO, Q12H but hold for SBP < 110 mmHg  · Cardiac diet, low fat, low cholesterol, no caffeine  · Avoid stimulants  · Replete electrolytes   · Can try non pharmacological measures if recurrent SVT like Valsalva maneuver, cold water to the face and ice pack application   · If persistent, consult ICU nurse/rapid response for adenosine administration     Hypomagnesemia  Assessment & Plan  Intermittent    Mag 1.6 on admission increased appropriately after 2 g of magnesium was given to 2.5 however on day after 2.5 patient is now decreased to 1.7.    · Monitor Magnesium levels  · Maintain Mag at 2 or above  · Magnesium levels at AM     Hypotension  Assessment & Plan    Pt's BP on arrival was 73/33 mmHg with HR of 198.  Pt was given two 1 L boluses of NS.     · Monitor BP   · Patient restarted on home metoprolol 12.5    Hypothyroid  Assessment & Plan    Lab Results   Component Value Date    [id number] 24.901 (H) 07/23/2023     Pt currently on Levothyroxine 88 mcg, PO, QD.  Patient reports good compliance with medications. TSH has improved from prior on 6/1/23 that was 143.129, but continues to be elevated. · Increase dosage of Levothyroxine from 88 mcg to 100 mcg, PO, QD   · Repeat TSH in 6 weeks          Anemia  Assessment & Plan    Hgb 14.3 on admission. Anemia has resolved. Currently on Ferrous sulfate 325 mg, PO, QD. No complaints of constipation. · Continue iron supplementation   · Transition to every other day for 6 more months    Hypokalemia  Assessment & Plan    Resolving as evidenced by normalizing potassium. Patient was given 40 mEq of KDUR and 20 mEq of IV KCL with initial potassium of 3.2. · Repeat and replenish as necessary  · Maintain K at 4 or above  · BMP daily     Nicotine abuse  Assessment & Plan    Pt is current smoker. Refers smoking 1 PPD. · Nicotine patch 21 mg, 1 patch, transdermal, QD   · Tobacco cessation education       Depression with anxiety  Assessment & Plan    Stable. Pt refers not feeling depressed. Mentions occasional episodes of anxiety. On Hydroxyzine 25 mg, PO, TID PRN and Quetiapine 25 mg, PO, QD HS PRN. · Continue home medications    Chronic obstructive pulmonary disease ([address])  Assessment & Plan  During admission patient was placed on 2 L/min of O2 per NC with saturations ranging from %. Home medication regimen includes albuterol 90 mcg/act inhaler, 2 puffs, Inhalation, Q6H PRN at home. Overt the course of admission patient had increased oxygen requirement and positive home O2/desaturation test.  Patient was negative for significant AHI is overnight. CT of chest wo contrast on 7/26 observed scarring in the posterior right lower lobe and some bronchial branch narrowing as well as trace effusions. Patient was also positive for atelectasis and mucous debris.     · Continue oxygen supplementation as needed  · Patient given albuterol scheduled every 6 hours  · Monitor SpO2  · Pulmonology consulted  · Patient advised to receive repeat CT scan in 6 weeks outpatient  · Patient advised to start Incruse daily as well as steroid with plan to wean on discharge      VTE Pharmacologic Prophylaxis: VTE Score: 2 Low Risk (Score 0-2) - Encourage Ambulation. Patient Centered Rounds: I have performed bedside rounds with nursing staff today. Discussions with Specialists or Other Care Team Provider: Pulmonology and cardiology    Education and Discussions with Family / Patient:   Patient Information Sharing: With the consent of Ann-Marie Tim , their loved ones were notified today by inpatient team of the patient’s condition and current plan. All questions answered. Current Length of Stay: 3 day(s)    Current Patient Status: Inpatient   Certification Statement: The patient will continue to require additional inpatient hospital stay due to Worsening dyspnea    Discharge Plan: Patient likely will be discharged on 7/27 if patient is clinically improving and at home oxygen is available for patient. Code Status: Level 1 - Full Code    Subjective:   Patient seen and examined at bedside. Patient reported no acute events overnight however he is feeling more more nervous about being herself. Patient reports she feels unsure/uncertain by herself. Patient is concerned she is needing more oxygen and does not believe that she can take the oxygen tank and her apartment that she rents. Patient reports she has had a friend who had a negative interaction with home oxygen tank exploded on so she is unsure if she can even have them. Patient was educated on results of desaturation study as well as overnight study. Patient denies any fever, chills, weakness, dizziness, lightheadedness, chest pain, palpitations change in bowel habits/urination. Patient reported she is feeling more congested than normal and believes it is her seasonal allergies which she normally takes Claritin-D and Flonase for.   Please note those medications have been continued during her admission. Objective     Objective:   Vitals:   Temp (24hrs), Av.4 °F (36.9 °C), Min:97.7 °F (36.5 °C), Max:98.7 °F (37.1 °C)    Temp:  [97.7 °F (36.5 °C)-98.7 °F (37.1 °C)] 98.7 °F (37.1 °C)  HR:  [69-86] 75  Resp:  [17-18] 17  BP: ()/(57-73) 90/57  SpO2:  [94 %-97 %] 97 %  Body mass index is 22.03 kg/m². Input and Output Summary (last 24 hours): Intake/Output Summary (Last 24 hours) at 2023 1703  Last data filed at 2023 1248  Gross per 24 hour   Intake 360 ml   Output --   Net 360 ml       Physical Exam:   Physical Exam  Vitals reviewed. Constitutional:       General: She is not in acute distress. Appearance: Normal appearance. She is not ill-appearing. Eyes:      General: No scleral icterus. Conjunctiva/sclera: Conjunctivae normal.   Cardiovascular:      Rate and Rhythm: Normal rate and regular rhythm. Pulses: Normal pulses. Heart sounds: Murmur heard. Pulmonary:      Effort: Pulmonary effort is normal. No respiratory distress. Breath sounds: Normal breath sounds. Transmitted upper airway sounds present. Abdominal:      General: Bowel sounds are normal. There is no distension. Palpations: Abdomen is soft. Tenderness: There is no abdominal tenderness. There is no right CVA tenderness, left CVA tenderness, guarding or rebound. Musculoskeletal:      Right lower leg: No edema. Left lower leg: No edema. Skin:     General: Skin is warm and dry. Capillary Refill: Capillary refill takes less than 2 seconds. Neurological:      Mental Status: She is alert and oriented to person, place, and time. Mental status is at baseline. Psychiatric:         Mood and Affect: Mood is anxious.          Behavior: Behavior normal.         Cognition and Memory: Cognition and memory normal.         Additional Data:     Labs:  Results from last 7 days   Lab Units 23  0403   WBC Thousand/uL 12.01*   HEMOGLOBIN g/dL 12.2   HEMATOCRIT % 38.2   PLATELETS Thousands/uL 231   NEUTROS PCT % 81*   LYMPHS PCT % 9*   MONOS PCT % 6   EOS PCT % 3     Results from last 7 days   Lab Units 07/26/23  0403   POTASSIUM mmol/L 4.0   CHLORIDE mmol/L 103   CO2 mmol/L 28   BUN mg/dL 17   CREATININE mg/dL 0.52*   CALCIUM mg/dL 8.7   ALK PHOS U/L 40   ALT U/L 7   AST U/L 10*     Results from last 7 days   Lab Units 07/25/23  0504   INR  1.01               * I Have Reviewed All Lab Data Listed Above. * Additional Pertinent Lab Tests Reviewed: [address] [facility] Admission Reviewed    Imaging:    Imaging Reports Reviewed Today Include:   CT chest wo contrast   Final Result      Atelectasis, scarring in the posterior right lower lobe with some bronchial branch narrowing and occlusion and trace right effusion. Superimposed infection to be excluded clinically   Mucous debris in the distal posterior trachea tracking into the bronchi bilaterally. Workstation performed: [id number]         XR chest pa & lateral   Final Result      Atelectasis and pleural effusion on the right.                Workstation performed: [id number]           Imaging Personally Reviewed by Myself Includes: Please see above    Recent Cultures (last 7 days):           Last 24 Hours Medication List:   Current Facility-Administered Medications   Medication Dose Route Frequency Provider Last Rate   • acetaminophen  650 mg Oral Q6H PRN Jung Cannon MD     • albuterol  2.5 mg Nebulization Q6H Jung Cannon MD     • atorvastatin  40 mg Oral Daily With Marbella Fang MD     • calcium carbonate  1,000 mg Oral Daily PRN Zehra Shin MD     • vitamin B-12  1,000 mcg Oral Daily Zehra Shin MD     • docusate sodium  100 mg Oral BID Zehra Shin MD     • ferrous sulfate  325 mg Oral Every Other Day Zehra Shin MD     • fluticasone  1 spray Nasal Daily Zehra Shin MD     • gabapentin  400 mg Oral BID Zehra Shin MD • hydrOXYzine HCL  25 mg Oral TID PRN Loris Romberg, MD     • levothyroxine  100 mcg Oral Early Morning Loris Romberg, MD     • loratadine  10 mg Oral Daily Loris Romberg, MD     • magnesium sulfate  2 g Intravenous Once Pat Doty MD     • methylPREDNISolone sodium succinate  40 mg Intravenous Daily Pat Doty MD     • metoprolol tartrate  12.5 mg Oral Q12H [address] Loris Romberg, MD     • nicotine  21 mg Transdermal Daily Loris Romberg, MD     • ondansetron  4 mg Intravenous Q6H PRN Loris Romberg, MD     • QUEtiapine  25 mg Oral HS PRN Loris Romberg, MD     • umeclidinium  1 puff Inhalation Daily Pat Doty MD               ** Please Note: Dictation voice to text software may have been used in the creation of this document.  **    Pat Doty MD  07/26/23  5:03 PM

## 2023-07-26 NOTE — PLAN OF CARE
Problem: Potential for Falls  Goal: Patient will remain free of falls  Description: INTERVENTIONS:  - Educate patient/family on patient safety including physical limitations  - Instruct patient to call for assistance with activity   - Consult OT/PT to assist with strengthening/mobility   - Keep Call bell within reach  - Keep bed low and locked with side rails adjusted as appropriate  - Keep care items and personal belongings within reach  - Initiate and maintain comfort rounds  - Make Fall Risk Sign visible to staff  - Offer Toileting every 2 Hours, in advance of need  - Initiate/Maintain bed alarm  - Obtain necessary fall risk management equipment: fall risk sign  - Apply yellow socks and bracelet for high fall risk patients  - Consider moving patient to room near nurses station  Outcome: Progressing     Problem: CARDIOVASCULAR - ADULT  Goal: Maintains optimal cardiac output and hemodynamic stability  Description: INTERVENTIONS:  - Monitor I/O, vital signs and rhythm  - Monitor for S/S and trends of decreased cardiac output  - Administer and titrate ordered vasoactive medications to optimize hemodynamic stability  - Assess quality of pulses, skin color and temperature  - Assess for signs of decreased coronary artery perfusion  - Instruct patient to report change in severity of symptoms  Outcome: Progressing     Problem: RESPIRATORY - ADULT  Goal: Achieves optimal ventilation and oxygenation  Description: INTERVENTIONS:  - Assess for changes in respiratory status  - Assess for changes in mentation and behavior  - Position to facilitate oxygenation and minimize respiratory effort  - Oxygen administered by appropriate delivery if ordered  - Initiate smoking cessation education as indicated  - Encourage broncho-pulmonary hygiene including cough, deep breathe, Incentive Spirometry  - Assess the need for suctioning and aspirate as needed  - Assess and instruct to report SOB or any respiratory difficulty  - Respiratory Therapy support as indicated  Outcome: Progressing

## 2023-07-26 NOTE — PHYSICAL THERAPY NOTE
PT TREATMENT       07/26/23 1529   PT Last Visit   PT Visit Date 07/26/23   Note Type   Note Type Treatment   Pain Assessment   Pain Assessment Tool 0-10   Pain Score No Pain  (generalized achiness but no pain reported)   Hospital Pain Intervention(s) Ambulation/increased activity   Multiple Pain Sites No   Restrictions/Precautions   Weight Bearing Precautions Per Order No   Other Precautions Fall Risk;O2;Pain   General   Chart Reviewed Yes   Family/Caregiver Present No   Cognition   Overall Cognitive Status WFL   Arousal/Participation Cooperative   Orientation Level Oriented X4   Following Commands Follows all commands and directions without difficulty   Subjective   Subjective "I just feel achy since I haven't been moving much"   Bed Mobility   Supine to Sit 7  Independent   Sit to Supine 7  Independent   Transfers   Sit to Stand 5  Supervision   Stand to Sit 5  Supervision   Ambulation/Elevation   Gait pattern Short stride; Step through pattern  (decreased gait speed, waddling type gait with RLE externally rotated)   Gait Assistance 5  Supervision   Additional items Verbal cues   Assistive Device None   Distance 100 feet x 2   Balance   Static Sitting Good   Dynamic Sitting Good   Static Standing Fair +   Dynamic Standing Fair   Ambulatory Fair   Activity Tolerance   Activity Tolerance Patient tolerated treatment well;Patient limited by fatigue   Nurse Made Aware yes   Assessment   Prognosis Good   Problem List Decreased strength;Decreased endurance; Impaired balance;Decreased mobility;Pain   Assessment Pt agreeable to PT session this afternoon. Able to progress ambulation x 200 feet with supervision + continued gait deviations as mentioned above. Pt reports mild fatigue due to achiness but no LOB noted throughout session. O2 to 86% while ambulating on 2L - pulonologist present at bedside to discuss with pt at EOS. The patient's AM-PAC Basic Mobility Inpatient Short Form Raw Score is 20.  A Raw score of greater than 16 suggests the patient may benefit from discharge to home. Please also refer to the recommendation of the Physical Therapist for safe discharge planning. Goals   Patient Goals to get better   Plan   Treatment/Interventions ADL retraining;LE strengthening/ROM; Functional transfer training; Therapeutic exercise; Endurance training;Bed mobility;Gait training;Spoke to nursing   Progress Progressing toward goals   PT Frequency 3-5x/wk   Recommendation   PT Discharge Recommendation No rehabilitation needs   AM-PAC Basic Mobility Inpatient   Turning in Flat Bed Without Bedrails 4   Lying on Back to Sitting on Edge of Flat Bed Without Bedrails 4   Moving Bed to Chair 3   Standing Up From Chair Using Arms 3   Walk in Room 3   Climb 3-5 Stairs With Railing 3   Basic Mobility Inpatient Raw Score 20   Basic Mobility Standardized Score 43.99   Highest Level Of Mobility   JH-HLM Goal 6: Walk 10 steps or more   JH-HLM Achieved 7: Walk 25 feet or more   End of Consult   Patient Position at End of Consult Seated edge of bed; All needs within reach   [facility] Insurance Number  Lexy [place] [id number]

## 2023-07-26 NOTE — PLAN OF CARE
Problem: PHYSICAL THERAPY ADULT  Goal: Performs mobility at highest level of function for planned discharge setting. See evaluation for individualized goals. Description: Treatment/Interventions: ADL retraining, LE strengthening/ROM, Functional transfer training, Therapeutic exercise, Endurance training, Bed mobility, Gait training, Spoke to nursing          See flowsheet documentation for full assessment, interventions and recommendations. Outcome: Progressing  Note: Prognosis: Good  Problem List: Decreased strength, Decreased endurance, Impaired balance, Decreased mobility, Pain  Assessment: Pt agreeable to PT session this afternoon. Able to progress ambulation x 200 feet with supervision + continued gait deviations as mentioned above. Pt reports mild fatigue due to achiness but no LOB noted throughout session. O2 to 86% while ambulating on 2L - pulonologist present at bedside to discuss with pt at EOS. PT Discharge Recommendation: No rehabilitation needs    See flowsheet documentation for full assessment.

## 2023-07-26 NOTE — DISCHARGE SUMMARY
Discharge Summary - [address] [place] [address] Medicine Residency     Patient Information: Ann-Marie Tim 63 y.o. female MRN: [id number]  Unit/Bed#: 913 [facility] 138-12 Encounter: [id number]     Admitting Physician: Red Roman MD  Discharging Physician/Practitioner: Red Roman MD   PCP: Maryse Jewell MD  Admission Date:   Admission Orders (From admission, onward)     Ordered        07/23/23 2004  Inpatient Admission  Once                      Discharge Date: 07/27/23      Reason for Admission: Palpitations [R00.2]  Hypokalemia [E87.6]  Hypomagnesemia [E83.42]  SVT (supraventricular tachycardia) ([address]) [I47.1]  Hypotension [I95.9]  Elevated TSH [R79.89]  Headache [R51.9]     Discharge Diagnoses:      Principal Problem:    SVT (supraventricular tachycardia) ([address])  Active Problems:    Chronic obstructive pulmonary disease ([address])    Depression with anxiety    Nicotine abuse    Hypokalemia    Anemia    Hypothyroid    Hypotension    Hypomagnesemia  Resolved Problems:    * No resolved hospital problems.  *       Consultations During Hospital Stay:  Cardiology- Dr. Suly Johnson MD  Pulmonology- Dr. Michelet Wade DO     Procedures Performed:   Overnight apneic events  Home O2 eval/desaturation screen  Cardiac catheterization 7/25     Significant Findings / Test Results:   Labs  7/27: WBC 12.64, Mg 2.0, Procal 0.07, D-dimer 0.34  7/26: Mg 1.7, Na-135,  Cr-0.52, Total Prot:5.8, Alb:3.3, Vit B12 463, WBC 12.01   7/25: Na 34, K 4.2, Hgb 12.9, MCHC 31.2, RDW 19.8, Cr 0.62  7/24: T4 1.13, WBC 7.19, Hgb 12.6, MCHC 31.3, RDW 20.5%, K 5.0, Cr 0.72, Tot Prot 6.0, Mag 2.5   7/23: K 3.2, Ca 7.7, Tot Prot 5.4, Alb 3.3, Mag 1.6, WBC 10.74, Hgb 14, RDW 20.2%, TSH 24.901     Imaging  7/24: Chest X-ray: Atelectasis with pleural effusion on right side  7/26: CT chest without contrast atelectasis, scarring in the posterior right lower lobe with some bronchial branch narrowing and occlusion and trace right effusion. Incidental Findings:   N/a     Test Results Pending at Discharge (will require follow up):   N/a     Outpatient Tests Requested:  CT of chest without contrast in 6 weeks  TSH in 6 weeks after change of Levothyroxine       Outpatient follow-up Requested:  Dr. Fransisco Green DO- Electrophysiology   Dr. Kelsey Weiss MD- Family medicine    Complications:   N/a    Things to address at first visit after hospitalization   · Has patient been compliant with oxygen therapy? · Has patient quit smoking? · Does patient need nicotine replacement? · Has patient been able to make appointments with pulmonology? · Is patient aware of her appointment with Dr. Leanne Nagy on August 30? · Is the patient's CT of chest and TSH ordered? · Has patient completed steroid taper and is still utilizing Incruse? · Has the patient needed to use her nebulizer, if so how frequent? Hospital Course:     César Seen is a 63 y.o. female with a past medical history of hypothyroidism, AVTAR, MDD, ABRAHAM, SVT and seasonal allergies presented to the ED after being brought by EMS for feeling of lightheadedness and dizziness. Patient reported she was in the store when she started feeling lightheaded as well as noticed her vision got blurry and dark. Patient was found by EMS to have elevated heart rate and be in SVT. Patient reports she had had to have a hospitalization previously May 2023 for management of her SVT. Patient was converted using adenosine in the ED and cardio was consulted for repeated episode. During patient's admission patient also was found to have increased oxygen requirement. Patient was found to have significant desaturation and will require at home oxygen supplementation. Patient will follow-up with cardiology/electrophysiology and pulmonology on discharge. * SVT (supraventricular tachycardia) ([address])  Patient presenting on arrival with HR of 198 bpm and hypotensive.  Given 6 mg of Adenosine, IV, once in the ED. Converted to NSR. Echo from 6/1/23 showed EF of 55% and no HF. Currently on Metoprolol 12.5 mg, PO, Q12H at home. Patient reported good compliance with medication. Patient reported her prior episode was in May 2023 for which she was admitted to [address]. Patient was seen by cardiology which suggested cardiac catheterization. On cardiac catheterization patient was found to have mild RCA lesion which is 40% stenosed with mild calcification of proximal and mid RCA. EF was found to be 60%. Patient was recommended to follow-up with EP. Patient advised to continue home medication and follow cardiac diet such as low-fat, low-cholesterol, no caffeine and avoid stimulants. Patient educated on nonpharmacological treatments of SVT like Valsalva maneuver, cold water and ice pack. Hypomagnesemia  Throughout patient's admission patient's magnesium fluctuated between low. Patient needed several episodes of magnesium supplementation. Hypotension  On arrival patient's blood pressure was decreased likely due to decreased stroke-volume in setting of SVT/heart rate of 198. in the ED patient was given 1 bolus of normal saline and patient's blood pressures increased after cardiac conversion. Hypothyroid  Prior to arrival patient was on Levothyroxine 88 mcg, however found that patient's TSH was still not within range albeit better than previous of 143.129 on 6/1/2023. Patient's levothyroxine was increased to 100 mcg daily and will need to repeat her TSH in 6 weeks. On discharge patient was given prescription for TSH. Anemia  Patient reports history of anemia however on admission patient's Hgb 14.3. On discharge patient will continue Ferrous sulfate 325 mg, PO, every other day for 6 months. Hypokalemia  Resolving as evidenced by normalizing potassium. During admission patient's potassium was monitored and replaced as needed.   On discharge patient encouraged to take/increase potassium intake. Nicotine abuse  Patient reported she was a current smoker on admission with approximately 30-pack-year history. During admission patient was started on nicotine patch and the importance of smoking cessation was taught. On discharge patient reports she does not want to smoke around oxygen tanks and would like nicotine replacement therapy in the form of patches and gum. Depression with anxiety  Patient reported stable on home medications which were continued throughout hospital stay. Patient will continue on home medications as prescribed. Chronic obstructive pulmonary disease (HCC)  Over the course of admission patient was placed on 2 L/min of O2 per NC with saturations ranging from %. Previous home medication regimen includes albuterol 90 mcg/act inhaler, 2 puffs, Inhalation, Q6H PRN at home. Patient was found to have positive home O2/desaturation test and negative for significant AHI is overnight. CT of chest wo contrast on 7/26 observed scarring in the posterior right lower lobe and some bronchial branch narrowing as well as trace effusions. Patient was also positive for atelectasis and mucous debris. Neurology was consulted. Patient will follow-up with CT scan in 6 weeks and follow-up with pulmonology. Patient was started on Incruse and steroid taper. Patient was also given temporary at home oxygen concentrator which this author assisted and instructed patient on how to use. Condition at Discharge: good      Discharge Day Visit / Exam:      Vitals: Blood Pressure: 104/58 (07/26/23 2249)  Pulse: 71 (07/27/23 0159)  Temperature: 98.2 °F (36.8 °C) (07/26/23 2249)  Temp Source: Oral (07/26/23 0734)  Respirations: 17 (07/27/23 0159)  Height: 5' (152.4 cm) (07/23/23 2113)  Weight - Scale: 51.2 kg (112 lb 14 oz) (07/27/23 0600)  SpO2: 95 % (07/27/23 0159)  Exam:   Physical Exam  Vitals reviewed. Constitutional:       General: She is not in acute distress. Appearance: Normal appearance. She is not ill-appearing. Eyes:      General: No scleral icterus. Conjunctiva/sclera: Conjunctivae normal.   Cardiovascular:      Rate and Rhythm: Normal rate and regular rhythm. Pulses: Normal pulses. Heart sounds: Murmur heard. Pulmonary:      Effort: Pulmonary effort is normal. No respiratory distress. Breath sounds: Normal breath sounds. Transmitted upper airway sounds present. Abdominal:      General: Bowel sounds are normal. There is no distension. Palpations: Abdomen is soft. Tenderness: There is no abdominal tenderness. There is no right CVA tenderness, left CVA tenderness, guarding or rebound. Musculoskeletal:      Right lower leg: No edema. Left lower leg: No edema. Skin:     General: Skin is warm and dry. Capillary Refill: Capillary refill takes less than 2 seconds. Neurological:      Mental Status: She is alert and oriented to person, place, and time. Mental status is at baseline. Psychiatric:         Attention and Perception: Attention and perception normal.         Mood and Affect: Mood is anxious (Patient reports anxiety over using oxygen tanks and if she is able to bring them to her residence as she rents a room). Speech: Speech normal.         Behavior: Behavior normal.         Thought Content: Thought content normal.         Cognition and Memory: Cognition and memory normal.            Discussion with Family:  Patient Information Sharing: With the consent of Ann-Marie Tim, their loved ones were notified today by inpatient team of the patient’s condition and current plan. All questions answered. Discharge instructions/Information to patient and family:   See after visit summary for information provided to patient and family. Discharge Medications:     Medication List      ASK your doctor about these medications    • albuterol 90 mcg/act inhaler; Commonly known as: Ventolin HFA;  Inhale 2   puffs every 6 (six) hours as needed for wheezing or shortness of breath  • atorvastatin 40 mg tablet; Commonly known as: LIPITOR; Take 1 tablet (40   mg total) by mouth daily with dinner  • docusate sodium 100 mg capsule; Commonly known as: COLACE; Take 1   capsule (100 mg total) by mouth 2 (two) times a day  • * ferrous sulfate 324 (65 Fe) mg; Take 1 tablet (324 mg total) by mouth   daily before breakfast  • * FeroSul 325 (65 Fe) mg tablet; Generic drug: ferrous sulfate  • fluticasone 50 mcg/act nasal spray; Commonly known as: FLONASE; 1 SPRAY   INTO EACH NOSTRIL DAILY  • * gabapentin 300 mg capsule; Commonly known as: NEURONTIN  • * gabapentin 400 mg capsule; Commonly known as: NEURONTIN  • hydrOXYzine HCL 25 mg tablet; Commonly known as: ATARAX  • levothyroxine 88 mcg tablet; Take 1 tablet (88 mcg total) by mouth daily   in the early morning  • loratadine 10 mg tablet; Commonly known as: CLARITIN; Take 1 tablet (10   mg total) by mouth daily  • metoprolol tartrate 25 mg tablet; Commonly known as: LOPRESSOR; Take 0.5   tablets (12.5 mg total) by mouth every 12 (twelve) hours Hold for systolic   blood pressure less than 110 mmHg and heart rate less than 50 beats per   minute. • multivitamin Tabs  • nicotine 21 mg/24 hr TD 24 hr patch; Commonly known as: Manav Anne;   Place 1 patch on the skin over 24 hours daily Do not start before Leslie 3,   2023. • * polyethylene glycol 17 g packet; Commonly known as: Elliott Thomas; Take 17 g   by mouth daily as needed (constipation)  • * GaviLAX 17 GM/SCOOP powder; Generic drug: polyethylene glycol  • QUEtiapine 25 mg tablet; Commonly known as: SEROquel  • * vitamin B-12 1,000 mcg tablet; Commonly known as: VITAMIN B-12; Take 1   tablet (1,000 mcg total) by mouth daily  • * vitamin B-12 1,000 mcg tablet; Commonly known as: VITAMIN B-12  * This list has 8 medication(s) that are the same as other medications   prescribed for you.  Read the directions carefully, and ask your doctor or   other care provider to review them with you. Disposition:   Home with [facility] (Reminder: Complete face to face encounter)     Discharge Statement:  I spent 35 minutes discharging the patient. This time was spent on the day of discharge. I had direct contact with the patient on the day of discharge. Greater than 50% of the total time was spent examining patient, answering all patient questions, arranging and discussing plan of care with patient as well as directly providing post-discharge instructions. Additional time then spent on discharge activities.      ** Please Note: This note has been constructed using a voice recognition system **     Briseyda Deluna MD   07/27/23  6:16 AM

## 2023-07-27 VITALS
HEIGHT: 60 IN | TEMPERATURE: 98.4 F | DIASTOLIC BLOOD PRESSURE: 71 MMHG | SYSTOLIC BLOOD PRESSURE: 127 MMHG | OXYGEN SATURATION: 96 % | HEART RATE: 71 BPM | RESPIRATION RATE: 20 BRPM | BODY MASS INDEX: 22.16 KG/M2 | WEIGHT: 112.87 LBS

## 2023-07-27 LAB
ALBUMIN SERPL BCP-MCNC: 3.5 G/DL (ref 3.5–5)
ALP SERPL-CCNC: 45 U/L (ref 34–104)
ALT SERPL W P-5'-P-CCNC: 8 U/L (ref 7–52)
ANION GAP SERPL CALCULATED.3IONS-SCNC: 6 MMOL/L
ANISOCYTOSIS BLD QL SMEAR: PRESENT
AST SERPL W P-5'-P-CCNC: 8 U/L (ref 13–39)
BASO STIPL BLD QL SMEAR: PRESENT
BASOPHILS # BLD MANUAL: 0 THOUSAND/UL (ref 0–0.1)
BASOPHILS NFR MAR MANUAL: 0 % (ref 0–1)
BILIRUB SERPL-MCNC: 0.23 MG/DL (ref 0.2–1)
BUN SERPL-MCNC: 15 MG/DL (ref 5–25)
CALCIUM SERPL-MCNC: 9.2 MG/DL (ref 8.4–10.2)
CHLORIDE SERPL-SCNC: 99 MMOL/L (ref 96–108)
CO2 SERPL-SCNC: 30 MMOL/L (ref 21–32)
CREAT SERPL-MCNC: 0.5 MG/DL (ref 0.6–1.3)
D DIMER PPP FEU-MCNC: 0.34 UG/ML FEU
DME PARACHUTE DELIVERY DATE ACTUAL: NORMAL
DME PARACHUTE DELIVERY DATE REQUESTED: NORMAL
DME PARACHUTE DELIVERY DATE REQUESTED: NORMAL
DME PARACHUTE ITEM DESCRIPTION: NORMAL
DME PARACHUTE ORDER STATUS: NORMAL
DME PARACHUTE ORDER STATUS: NORMAL
DME PARACHUTE SUPPLIER NAME: NORMAL
DME PARACHUTE SUPPLIER NAME: NORMAL
DME PARACHUTE SUPPLIER PHONE: NORMAL
DME PARACHUTE SUPPLIER PHONE: NORMAL
EOSINOPHIL # BLD MANUAL: 0 THOUSAND/UL (ref 0–0.4)
EOSINOPHIL NFR BLD MANUAL: 0 % (ref 0–6)
ERYTHROCYTE [DISTWIDTH] IN BLOOD BY AUTOMATED COUNT: 19.2 % (ref 11.6–15.1)
GFR SERPL CREATININE-BSD FRML MDRD: 105 ML/MIN/1.73SQ M
GLUCOSE SERPL-MCNC: 163 MG/DL (ref 65–140)
HCT VFR BLD AUTO: 36.3 % (ref 34.8–46.1)
HGB BLD-MCNC: 11.7 G/DL (ref 11.5–15.4)
LG PLATELETS BLD QL SMEAR: PRESENT
LYMPHOCYTES # BLD AUTO: 0.51 THOUSAND/UL (ref 0.6–4.47)
LYMPHOCYTES # BLD AUTO: 4 % (ref 14–44)
MAGNESIUM SERPL-MCNC: 2 MG/DL (ref 1.9–2.7)
MCH RBC QN AUTO: 29.7 PG (ref 26.8–34.3)
MCHC RBC AUTO-ENTMCNC: 32.2 G/DL (ref 31.4–37.4)
MCV RBC AUTO: 92 FL (ref 82–98)
MONOCYTES # BLD AUTO: 0.13 THOUSAND/UL (ref 0–1.22)
MONOCYTES NFR BLD: 1 % (ref 4–12)
NEUTROPHILS # BLD MANUAL: 12.01 THOUSAND/UL (ref 1.85–7.62)
NEUTS BAND NFR BLD MANUAL: 3 % (ref 0–8)
NEUTS SEG NFR BLD AUTO: 92 % (ref 43–75)
PLATELET # BLD AUTO: 215 THOUSANDS/UL (ref 149–390)
PLATELET BLD QL SMEAR: ADEQUATE
PMV BLD AUTO: 9.1 FL (ref 8.9–12.7)
POTASSIUM SERPL-SCNC: 3.6 MMOL/L (ref 3.5–5.3)
PROCALCITONIN SERPL-MCNC: 0.07 NG/ML
PROT SERPL-MCNC: 6.3 G/DL (ref 6.4–8.4)
RBC # BLD AUTO: 3.94 MILLION/UL (ref 3.81–5.12)
RBC MORPH BLD: PRESENT
SODIUM SERPL-SCNC: 135 MMOL/L (ref 135–147)
WBC # BLD AUTO: 12.64 THOUSAND/UL (ref 4.31–10.16)

## 2023-07-27 PROCEDURE — 94640 AIRWAY INHALATION TREATMENT: CPT

## 2023-07-27 PROCEDURE — 99232 SBSQ HOSP IP/OBS MODERATE 35: CPT | Performed by: INTERNAL MEDICINE

## 2023-07-27 PROCEDURE — 85379 FIBRIN DEGRADATION QUANT: CPT | Performed by: INTERNAL MEDICINE

## 2023-07-27 PROCEDURE — 83735 ASSAY OF MAGNESIUM: CPT

## 2023-07-27 PROCEDURE — 85027 COMPLETE CBC AUTOMATED: CPT

## 2023-07-27 PROCEDURE — 99239 HOSP IP/OBS DSCHRG MGMT >30: CPT | Performed by: FAMILY MEDICINE

## 2023-07-27 PROCEDURE — 94760 N-INVAS EAR/PLS OXIMETRY 1: CPT

## 2023-07-27 PROCEDURE — 84145 PROCALCITONIN (PCT): CPT | Performed by: INTERNAL MEDICINE

## 2023-07-27 PROCEDURE — 80053 COMPREHEN METABOLIC PANEL: CPT

## 2023-07-27 PROCEDURE — 85007 BL SMEAR W/DIFF WBC COUNT: CPT

## 2023-07-27 RX ORDER — GABAPENTIN 300 MG/1
300 CAPSULE ORAL 2 TIMES DAILY
Refills: 0
Start: 2023-07-27 | End: 2023-08-14 | Stop reason: SDUPTHER

## 2023-07-27 RX ORDER — LEVOTHYROXINE SODIUM 0.1 MG/1
100 TABLET ORAL
Qty: 60 TABLET | Refills: 0 | Status: SHIPPED | OUTPATIENT
Start: 2023-07-27

## 2023-07-27 RX ORDER — PREDNISONE 10 MG/1
TABLET ORAL
Qty: 22 TABLET | Refills: 0 | Status: SHIPPED | OUTPATIENT
Start: 2023-07-27 | End: 2023-08-06

## 2023-07-27 RX ORDER — LEVALBUTEROL INHALATION SOLUTION 1.25 MG/3ML
1.25 SOLUTION RESPIRATORY (INHALATION) 3 TIMES DAILY PRN
Qty: 270 ML | Refills: 0 | Status: SHIPPED | OUTPATIENT
Start: 2023-07-27 | End: 2023-08-26

## 2023-07-27 RX ORDER — DOCUSATE SODIUM 100 MG/1
100 CAPSULE, LIQUID FILLED ORAL DAILY
Start: 2023-07-27

## 2023-07-27 RX ORDER — NICOTINE 21 MG/24HR
1 PATCH, TRANSDERMAL 24 HOURS TRANSDERMAL DAILY
Qty: 28 PATCH | Refills: 0 | Status: SHIPPED | OUTPATIENT
Start: 2023-07-27 | End: 2023-08-14 | Stop reason: ALTCHOICE

## 2023-07-27 RX ORDER — ALBUTEROL SULFATE 2.5 MG/3ML
2.5 SOLUTION RESPIRATORY (INHALATION) EVERY 6 HOURS PRN
Qty: 60 ML | Refills: 3 | Status: SHIPPED | OUTPATIENT
Start: 2023-07-27 | End: 2023-07-27

## 2023-07-27 RX ADMIN — ALBUTEROL SULFATE 2.5 MG: 2.5 SOLUTION RESPIRATORY (INHALATION) at 08:13

## 2023-07-27 RX ADMIN — FLUTICASONE PROPIONATE 1 SPRAY: 50 SPRAY, METERED NASAL at 09:33

## 2023-07-27 RX ADMIN — ALBUTEROL SULFATE 2.5 MG: 2.5 SOLUTION RESPIRATORY (INHALATION) at 01:58

## 2023-07-27 RX ADMIN — ALBUTEROL SULFATE 2.5 MG: 2.5 SOLUTION RESPIRATORY (INHALATION) at 13:07

## 2023-07-27 RX ADMIN — CYANOCOBALAMIN TAB 500 MCG 1000 MCG: 500 TAB at 09:26

## 2023-07-27 RX ADMIN — METHYLPREDNISOLONE SODIUM SUCCINATE 40 MG: 40 INJECTION, POWDER, FOR SOLUTION INTRAMUSCULAR; INTRAVENOUS at 09:26

## 2023-07-27 RX ADMIN — UMECLIDINIUM 1 PUFF: 62.5 AEROSOL, POWDER ORAL at 09:33

## 2023-07-27 RX ADMIN — NICOTINE 21 MG: 21 PATCH, EXTENDED RELEASE TRANSDERMAL at 09:34

## 2023-07-27 RX ADMIN — LEVOTHYROXINE SODIUM 100 MCG: 100 TABLET ORAL at 05:20

## 2023-07-27 RX ADMIN — LORATADINE 10 MG: 10 TABLET ORAL at 09:26

## 2023-07-27 RX ADMIN — GABAPENTIN 400 MG: 400 CAPSULE ORAL at 09:26

## 2023-07-27 RX ADMIN — DOCUSATE SODIUM 100 MG: 100 CAPSULE, LIQUID FILLED ORAL at 09:26

## 2023-07-27 NOTE — PROGRESS NOTES
Progress Note - Pulmonary   Aron Wise 63 y.o. female MRN: [id number]  Unit/Bed#: [address] [place] [address] Encounter: [id number]    Assessment/Plan:    Acute hypoxic respiratory failure, suspected due to mild atelectasis right lower lobe combined with COPD and restrictive lung impairment due to kyphoscoliosis   She has qualified for supplemental oxygen to wear 2 L at rest and 3 L with exertion. Continue incentive spirometer. She will start Incruse and use albuterol as needed at discharge. Can transition to prednisone 40 mg daily and taper by 10 mg every 3 days as tolerated. Outpatient PFTs and follow-up. Abnormal chest imaging with right lower lobe density   Repeat CT of the chest in 4 to 6 weeks. Nicotine dependence   Recommend complete cessation. Outpatient pulmonary follow-up after discharge as per discharge instructions. Chief Complaint:    "I think I feel better."    Subjective:    Gabi Sewell is resting in bed this morning. She reports she thinks she feels better. She did receive a portable concentrator from the equipment company yesterday and feels she is likely ready for discharge today. She has not been moving around yet very much. Objective:    Vitals: Blood pressure 104/58, pulse 71, temperature 97.8 °F (36.6 °C), temperature source Tympanic, resp. rate 16, height 5' (1.524 m), weight 51.2 kg (112 lb 14 oz), SpO2 (!) 6 %, not currently breastfeeding. 2.5L NC,Body mass index is 22.04 kg/m². Intake/Output Summary (Last 24 hours) at 7/27/2023 1237  Last data filed at 7/26/2023 1827  Gross per 24 hour   Intake 240 ml   Output --   Net 240 ml       Invasive Devices     Peripheral Intravenous Line  Duration           Peripheral IV 07/23/23 Right;Ventral (anterior) Forearm 3 days                Physical Exam:     Physical Exam  Vitals reviewed. Constitutional:       General: She is not in acute distress. Appearance: She is well-developed. She is not toxic-appearing or diaphoretic. Interventions: Nasal cannula in place. HENT:      Head: Normocephalic and atraumatic. Eyes:      General: No scleral icterus. Neck:      Trachea: No tracheal deviation. Cardiovascular:      Rate and Rhythm: Normal rate and regular rhythm. Heart sounds: S1 normal and S2 normal. No murmur heard. No friction rub. No gallop. Pulmonary:      Effort: Pulmonary effort is normal. No tachypnea, accessory muscle usage or respiratory distress. Breath sounds: Normal breath sounds. No stridor. No decreased breath sounds, wheezing, rhonchi or rales. Chest:      Chest wall: No tenderness. Abdominal:      General: Bowel sounds are normal. There is no distension. Palpations: Abdomen is soft. Tenderness: There is no abdominal tenderness. Musculoskeletal:      Cervical back: Neck supple. Right lower leg: No edema. Left lower leg: No edema. Skin:     General: Skin is warm and dry. Findings: No rash. Neurological:      Mental Status: She is alert and oriented to person, place, and time. GCS: GCS eye subscore is 4. GCS verbal subscore is 5. GCS motor subscore is 6. Psychiatric:         Speech: Speech normal.         Behavior: Behavior normal. Behavior is cooperative.        Labs:   CBC:   Lab Results   Component Value Date    WBC 12.64 (H) 07/27/2023    HGB 11.7 07/27/2023    HCT 36.3 07/27/2023    MCV 92 07/27/2023     07/27/2023    RBC 3.94 07/27/2023    MCH 29.7 07/27/2023    MCHC 32.2 07/27/2023    RDW 19.2 (H) 07/27/2023    MPV 9.1 07/27/2023   , CMP:   Lab Results   Component Value Date    SODIUM 135 07/27/2023    K 3.6 07/27/2023    CL 99 07/27/2023    CO2 30 07/27/2023    BUN 15 07/27/2023    CREATININE 0.50 (L) 07/27/2023    CALCIUM 9.2 07/27/2023    AST 8 (L) 07/27/2023    ALT 8 07/27/2023    ALKPHOS 45 07/27/2023    EGFR 105 07/27/2023     Procalcitonin 0.07    D-dimer 0.34    Imaging and other studies: None today

## 2023-07-27 NOTE — PLAN OF CARE
Problem: MOBILITY - ADULT  Goal: Maintain or return to baseline ADL function  Description: INTERVENTIONS:  -  Assess patient's ability to carry out ADLs; assess patient's baseline for ADL function and identify physical deficits which impact ability to perform ADLs (bathing, care of mouth/teeth, toileting, grooming, dressing, etc.)  - Assess/evaluate cause of self-care deficits   - Assess range of motion  - Assess patient's mobility; develop plan if impaired  - Assess patient's need for assistive devices and provide as appropriate  - Encourage maximum independence but intervene and supervise when necessary  - Involve family in performance of ADLs  - Assess for home care needs following discharge   - Consider OT consult to assist with ADL evaluation and planning for discharge  - Provide patient education as appropriate  Outcome: Progressing     Problem: CARDIOVASCULAR - ADULT  Goal: Absence of cardiac dysrhythmias or at baseline rhythm  Description: INTERVENTIONS:  - Continuous cardiac monitoring, vital signs, obtain 12 lead EKG if ordered  - Administer antiarrhythmic and heart rate control medications as ordered  - Monitor electrolytes and administer replacement therapy as ordered  Outcome: Progressing

## 2023-07-27 NOTE — CONSULTS
Consultation - Pulmonary Medicine   Michael Gongora 63 y.o. female MRN: [id number]  Unit/Bed#: [address] [place] [address] Encounter: [id number]      Assessment:  Mild intermittent hypoxemia which could be due to some mild atelectasis right lower lobe combined with COPD and some restrictive lung impairment from her levoscoliosis of the thoracic-lumbar spine. Does have a single arango type dustin left spine  COPD-centrilobular emphysema with possible exacerbation  Nicotine dependence  SVT now resolved    Plan:   Start Incruse 1 puff daily in AM  Incentive spirometry  PFT-spirometry as outpatient  Trial of steroid for possible COPD exacerbation - Solumedrol 40 mg IV daily  Check RA ambulatory oximetry  I discusses smoking cessation with patient  Follow up CT of chest without contrast in 4 to 6 weeks to see if RLLL density resolves  Check serum d-dimer        History of Present Illness   Physician Requesting Consult: Jason Sánchez MD  Reason for Consult / Principal Problem: hypoxemia, COPD  Hx and PE limited by: none     HPI: Michael Gongora is a 63y.o. year old female presented to the ER on 7/23 after she started to feel lightheaded and felt she may pass out while standing in checkout line at [facility]. She had been feeling okay earlier in the day. Initial heart rate in ER was 198 and BP 73/33 mm Hg with RA O2 sat 94%. She was found to have SVT and was given adenosine 6 mg IV with return to normal sinus rhythm. Also received 1 liter of normal saline IV fluid. Clemenciahas had some mild intermittent oxygen desaturation to as low as 87% and now is on 2 l/m NC oxygen with O2 sat of 93%    Needs he does have some chronic exertional shortness of breath is such as going up a flight of stairs. She does has history of smoking 1 pack of cigarettes per day for most of her adult life. She is not having any chest pain. Does have periodic cough. Initial chest x-ray shows evidence of significant levoscoliosis of the thoracolumbar spine. There is a single dustin on the left side for scoliosis. There are some infiltrate at right lung base. CT of chest was done today and I did review this. There is some partial consolidation posterior right lower lobe possibly due to atelectasis. There are some slight degree mucus seen in distal trachea and right lower lobe bronchus. Some very mild right middle lobe atelectasis. There are some emphysematous changes. Has trace right pleural effusion    She denies any weight loss. Not short of breath at rest now. She did have cardiac catheterization done yesterday which showed mid RCA lesion which was 40% stenosis and some mild calcification in the proximal mid right coronary artery. Left ventricular systolic function was normal with ejection fraction of 60%. She does showed evidence only mild nonobstructive coronary artery disease. Echocardiogram done on June 1 showed normal LV systolic function and no evidence of pulmonary artery hypertension     She does have an aalbuterol inhaler at home which she uses periodiaclly      Review of Systems   Constitutional: Negative for chills, fever and unexpected weight change. HENT: Negative for congestion, rhinorrhea and sore throat. Eyes: Negative for discharge and redness. Respiratory:        Does have some chronic exertional shortness of breath   Cardiovascular: Negative for chest pain, palpitations and leg swelling. Gastrointestinal: Negative for abdominal distention, abdominal pain and nausea. Endocrine: Negative for polydipsia and polyphagia. Genitourinary: Negative for dysuria. Musculoskeletal: Negative for joint swelling and myalgias. Skin: Negative for rash. Neurological: Negative for light-headedness. Psychiatric/Behavioral: Negative for decreased concentration.        Historical Information   Past Medical History:   Diagnosis Date   • Anxiety    • Depression    • Disease of thyroid gland    • Elevated troponin 5/31/2023   • History of alcohol abuse 5/31/2023   • Substance abuse [facility])      Past Surgical History:   Procedure Laterality Date   • BACK SURGERY  01/01/1973    scolosis rods   • ELBOW SURGERY  05/01/2018   • WISDOM TOOTH EXTRACTION       Social History   Social History     Substance and Sexual Activity   Alcohol Use Not Currently    Comment: none 7/2018-went to rehab     Social History     Substance and Sexual Activity   Drug Use Not Currently     Social History     Tobacco Use   Smoking Status Some Days   • Packs/day: 1.00   • Types: Cigarettes   Smokeless Tobacco Never         Family History:   Family History   Problem Relation Age of Onset   • Colon cancer Mother    • Breast cancer Mother    • Cancer Mother    • Leukemia Father        Meds/Allergies     Current Facility-Administered Medications:   •  acetaminophen (TYLENOL) tablet 650 mg, 650 mg, Oral, Q6H PRN, Yash Nails MD, 650 mg at 07/26/23 2122  •  albuterol inhalation solution 2.5 mg, 2.5 mg, Nebulization, Q6H, Yash Nails MD, 2.5 mg at 07/26/23 2029  •  atorvastatin (LIPITOR) tablet 40 mg, 40 mg, Oral, Daily With Bonnie Adam MD, 40 mg at 07/26/23 1708  •  calcium carbonate (TUMS) chewable tablet 1,000 mg, 1,000 mg, Oral, Daily PRN, Sandra Jay MD  •  cyanocobalamin (VITAMIN B-12) tablet 1,000 mcg, 1,000 mcg, Oral, Daily, Sandra Jay MD, 1,000 mcg at 07/26/23 0847  •  docusate sodium (COLACE) capsule 100 mg, 100 mg, Oral, BID, Sandra Jay MD, 100 mg at 07/26/23 1708  •  ferrous sulfate tablet 325 mg, 325 mg, Oral, Every Other Day, Sandra Jay MD, 325 mg at 07/26/23 0847  •  fluticasone (FLONASE) 50 mcg/act nasal spray 1 spray, 1 spray, Nasal, Daily, Sandra Jay MD, 1 spray at 07/26/23 0846  •  gabapentin (NEURONTIN) capsule 400 mg, 400 mg, Oral, BID, Sandra Jay MD, 400 mg at 07/26/23 1708  •  hydrOXYzine HCL (ATARAX) tablet 25 mg, 25 mg, Oral, TID PRN, Sandra Jay MD  • levothyroxine tablet 100 mcg, 100 mcg, Oral, Early Morning, Sonja Moreno MD, 100 mcg at 07/26/23 0535  •  loratadine (CLARITIN) tablet 10 mg, 10 mg, Oral, Daily, Jose A Cannon MD, 10 mg at 07/26/23 8435  •  methylPREDNISolone sodium succinate (Solu-MEDROL) injection 40 mg, 40 mg, Intravenous, Daily, Dennie Dess, MD, 40 mg at 07/26/23 1708  •  metoprolol tartrate (LOPRESSOR) partial tablet 12.5 mg, 12.5 mg, Oral, Q12H [facility], Jose A Cannon MD, 12.5 mg at 07/26/23 0847  •  nicotine (NICODERM CQ) 21 mg/24 hr TD 24 hr patch 21 mg, 21 mg, Transdermal, Daily, Jose A Cannon MD, 21 mg at 07/26/23 0847  •  [facility]etron [facility]) injection 4 mg, 4 mg, Intravenous, Q6H PRN, Jose A Cannon MD, 4 mg at 07/26/23 1008  •  QUEtiapine (SEROquel) tablet 25 mg, 25 mg, Oral, HS PRN, Jose A Cannon MD, 25 mg at 07/26/23 2122  •  umeclidinium 62.5 mcg/actuation inhaler AEPB 1 puff, 1 puff, Inhalation, Daily, Dennie Dess, MD, 1 puff at 07/26/23 1708    Prior to Admission medications    Medication Sig Start Date End Date Taking?  Authorizing Provider   albuterol (Ventolin HFA) 90 mcg/act inhaler Inhale 2 puffs every 6 (six) hours as needed for wheezing or shortness of breath 7/3/23  Yes Anjali Martinez MD   atorvastatin (LIPITOR) 40 mg tablet Take 1 tablet (40 mg total) by mouth daily with dinner 7/3/23  Yes Anjali Martinez MD   docusate sodium (COLACE) 100 mg capsule Take 1 capsule (100 mg total) by mouth 2 (two) times a day  Patient taking differently: Take 100 mg by mouth daily Pt verbalized she takes this once daily 6/2/23  Yes Ken Otero,    ferrous sulfate 324 (65 Fe) mg Take 1 tablet (324 mg total) by mouth daily before breakfast 6/2/23  Yes Barbara Crane, DO   fluticasone (FLONASE) 50 mcg/act nasal spray 1 SPRAY INTO EACH NOSTRIL DAILY 5/24/23  Yes Anjali Martinez MD   gabapentin (NEURONTIN) 300 mg capsule Take 400 mg by mouth 2 (two) times a day   Yes Historical Provider, MD hydrOXYzine HCL (ATARAX) 25 mg tablet Take 25 mg by mouth 3 (three) times a day as needed 3/15/21  Yes Historical Provider, MD   levothyroxine 88 mcg tablet Take 1 tablet (88 mcg total) by mouth daily in the early morning 7/3/23  Yes Kusum Betancourt MD   loratadine (CLARITIN) 10 mg tablet Take 1 tablet (10 mg total) by mouth daily 1/16/23  Yes Kusum Betancourt MD   metoprolol tartrate (LOPRESSOR) 25 mg tablet Take 0.5 tablets (12.5 mg total) by mouth every 12 (twelve) hours Hold for systolic blood pressure less than 110 mmHg and heart rate less than 50 beats per minute. 7/3/23  Yes Kusum Betancourt MD   multivitamin [facility]) TABS Take 1 tablet by mouth daily   Yes Historical Provider, MD   vitamin B-12 (VITAMIN B-12) 1,000 mcg tablet Take 1 tablet (1,000 mcg total) by mouth daily 6/2/23  Yes Clara Larson DO   FeroSul 325 (65 Fe) MG tablet  6/2/23   Historical Provider, MD   gabapentin (NEURONTIN) 400 mg capsule Take 400 mg by mouth 3 (three) times a day  Patient not taking: Reported on 7/3/2023 6/12/23   Historical Provider, MD PalmJustin Kudo 17 GM/SCOOP powder  6/2/23   Historical Provider, MD   nicotine (NICODERM CQ) 21 mg/24 hr TD 24 hr patch Place 1 patch on the skin over 24 hours daily Do not start before Leslie 3, 2023. Patient not taking: Reported on 7/3/2023 6/3/23   Barbara Crane DO   polyethylene glycol (MIRALAX) 17 g packet Take 17 g by mouth daily as needed (constipation)  Patient not taking: Reported on 7/3/2023 6/2/23   Barbara Crane DO   QUEtiapine (SEROquel) 25 mg tablet Take 25 mg by mouth daily at bedtime as needed (for sleep)    Historical Provider, MD   vitamin B-12 (VITAMIN B-12) 1,000 mcg tablet  6/2/23   Historical Provider, MD       Allergies   Allergen Reactions   • Other Allergic Rhinitis     seasonal       Objective   Vitals: Blood pressure 96/60, pulse 80, temperature 99.2 °F (37.3 °C), resp.  rate 16, height 5' (1.524 m), weight 51.2 kg (112 lb 12.8 oz), SpO2 93 %, not currently breastfeeding. ,Body mass index is 22.03 kg/m². Intake/Output Summary (Last 24 hours) at 7/26/2023 2141  Last data filed at 7/26/2023 1827  Gross per 24 hour   Intake 480 ml   Output --   Net 480 ml     Invasive Devices     Peripheral Intravenous Line  Duration           Peripheral IV 07/23/23 Right;Ventral (anterior) Forearm 3 days                Physical Exam  Vitals reviewed. Constitutional:       General: She is not in acute distress. Appearance: Normal appearance. She is well-developed. HENT:      Head: Normocephalic. Right Ear: External ear normal.      Left Ear: External ear normal.      Nose: Nose normal.      Mouth/Throat:      Mouth: Mucous membranes are moist.      Pharynx: Oropharynx is clear. No oropharyngeal exudate. Eyes:      Conjunctiva/sclera: Conjunctivae normal.      Pupils: Pupils are equal, round, and reactive to light. Neck:      Vascular: No JVD. Trachea: No tracheal deviation. Cardiovascular:      Rate and Rhythm: Normal rate and regular rhythm. Heart sounds: Normal heart sounds. Pulmonary:      Effort: Pulmonary effort is normal.      Comments: Lung sounds are decreased but clear, no crackles, wheezes, or rhonchi  Abdominal:      General: There is no distension. Palpations: Abdomen is soft. Tenderness: There is no abdominal tenderness. There is no guarding. Musculoskeletal:      Cervical back: Neck supple. Comments: No edema, cyanosis, or clubbing   Lymphadenopathy:      Cervical: No cervical adenopathy. Skin:     General: Skin is warm and dry. Findings: No rash. Neurological:      General: No focal deficit present. Mental Status: She is alert and oriented to person, place, and time. Psychiatric:         Behavior: Behavior normal.         Thought Content:  Thought content normal.         Lab Results: ABG: No results found for: "PHART", "SZF5DFS", "PO2ART", "OTL5GSA", "D9WYOLXI", "BEART", "SOURCE", BNP: No results found for: "BNP", CBC:   Lab Results   Component Value Date    WBC 12.01 (H) 07/26/2023    HGB 12.2 07/26/2023    HCT 38.2 07/26/2023    MCV 92 07/26/2023     07/26/2023    RBC 4.16 07/26/2023    MCH 29.3 07/26/2023    MCHC 31.9 07/26/2023    RDW 19.6 (H) 07/26/2023    MPV 9.3 07/26/2023    NRBC 0 07/26/2023   , CMP:   Lab Results   Component Value Date    K 4.0 07/26/2023     07/26/2023    CO2 28 07/26/2023    BUN 17 07/26/2023    CREATININE 0.52 (L) 07/26/2023    CALCIUM 8.7 07/26/2023    AST 10 (L) 07/26/2023    ALT 7 07/26/2023    ALKPHOS 40 07/26/2023    EGFR 104 07/26/2023   , PT/INR:   Lab Results   Component Value Date    INR 1.01 07/25/2023   , Troponin: No results found for: "TROPONIN"    Imaging Studies: I have personally reviewed pertinent reports. and I have personally reviewed pertinent films in PACS    EKG, Pathology, and Other Studies: I have personally reviewed pertinent reports. VTE Prophylaxis: Sequential compression device (Venodyne)     Code Status: Level 1 - Full Code    Counseling/Coordination of Care: Total floor / unit time spent today 30 minutes. Greater than 50% of total time was spent with the patient and / or family counseling and / or coordination of care.  A description of the counseling / coordination of care: I reviewed chart, ct of chest, and discussed diagnosis and treatment with the patient

## 2023-07-27 NOTE — CASE MANAGEMENT
Case Management Discharge Planning Note    Patient name Bin Murray  Location [address] [facility] [address]-* MRN [id number]  : 1960 Date 2023       Current Admission Date: 2023  Current Admission Diagnosis:SVT (supraventricular tachycardia) [facility])   Patient Active Problem List    Diagnosis Date Noted   • Hypotension 2023   • Hypomagnesemia 2023   • Seborrheic keratoses, inflamed 2023   • Hypothyroid 2023   • SVT (supraventricular tachycardia) ([address]) 2023   • Depression with anxiety 2023   • Nicotine abuse 2023   • Hypokalemia 2023   • Anemia 2023   • Chronic low back pain 2023   • Chronic obstructive pulmonary disease ([address]) 2019      LOS (days): 4  Geometric Mean LOS (GMLOS) (days): 2.10  Days to GMLOS:-1.7     OBJECTIVE:  Risk of Unplanned Readmission Score: 16.92         Current admission status: Inpatient   Preferred Pharmacy:   [address] [place] [id number]  Phone: [phone] Fax: [phone]    [address] [place] [address]  [address] [place] [address]  Phone: [phone] Fax: [phone]    Primary Care Provider: Ayse Schofield MD    Primary Insurance: [address] [place] [address]  Secondary Insurance:     DISCHARGE DETAILS:    DME Referral Provided  Referral made for DME?: Yes  DME referral completed for the following items[de-identified] Nebulizer  DME Supplier Name[de-identified] [employer]    Other Referral/Resources/Interventions Provided:  Interventions: DME  Referral Comments: CM made aware that patient will require a nebulizer. CM met with patient at bedside to confirm it was okay to order a nebulizer for her and to ensure that she does not have one at home. Patient agreeable for CM to order nebulizer and also stated that she will need oxygen to take to work as she cannot take a tank to work.  CM placed order for nebulizer and POC to [employer] via [employer]. Nebulizer delivered to patient in her room, patient signed delivery receipt, a copy provided to patient, a copy placed in scan bin, copy uploaded to [address] [place] [address] and original placed in dme folder. CM to request Lyft when ready for discharge.      Treatment Team Recommendation: Home  Discharge Destination Plan[de-identified] Home  Transport at Discharge : Ride Share

## 2023-07-27 NOTE — CASE MANAGEMENT
Case Management Discharge Planning Note    Patient name Jaskaran Fine  Location [address] [facility] 417/[address]-* MRN [id number]  : 1960 Date 2023       Current Admission Date: 2023  Current Admission Diagnosis:SVT (supraventricular tachycardia) [facility])   Patient Active Problem List    Diagnosis Date Noted   • Hypotension 2023   • Hypomagnesemia 2023   • Seborrheic keratoses, inflamed 2023   • Hypothyroid 2023   • SVT (supraventricular tachycardia) ([address]) 2023   • Depression with anxiety 2023   • Nicotine abuse 2023   • Hypokalemia 2023   • Anemia 2023   • Chronic low back pain 2023   • Chronic obstructive pulmonary disease ([address]) 2019      LOS (days): 4  Geometric Mean LOS (GMLOS) (days): 2.10  Days to GMLOS:-1.7     OBJECTIVE:  Risk of Unplanned Readmission Score: 17.86         Current admission status: Inpatient   Preferred Pharmacy:   [address] [place] [id number]  Phone: [phone] Fax: [phone]    [facility] [address] [place] [address]  [address] [place] [address]  Phone: [phone] Fax: [phone]    Primary Care Provider: Ayse Schofield MD    Primary Insurance: [facility]  Secondary Insurance:     DISCHARGE DETAILS:    Other Referral/Resources/Interventions Provided:  Referral Comments: Liset requested for patient with  time for 1640. RAFAELA Andrews made aware. Liset waiver reviewed with patient, patient's signature obtained, a copy provided to patient and a copy placed in scan bin for chart.      Treatment Team Recommendation: Home  Discharge Destination Plan[de-identified] Home  Transport at Discharge : Ride Share  Dispatcher Contacted: Yes  Number/Name of Dispatcher: [employer]  Transported by [employer] and Unit #): TBD  ETA of Transport (Date): 23  ETA of Transport (Time): 1640

## 2023-07-27 NOTE — DISCHARGE INSTR - AVS FIRST PAGE
Follow-up with pulmonology, cardiology, electrophysiology, primary care. Appointment on 8/30 with electrophysiologist.    Will need repeat chest CT at 6 weeks after discharge as well as repeat TSH at 6 weeks after discharge. Orders are placed can go to outpatient testing in [facility]. Please utilize nicotine replacement therapy and oxygenation therapy. Please continue to not smoke.     You can use 3 L oxygen continuously

## 2023-07-28 ENCOUNTER — TRANSITIONAL CARE MANAGEMENT (OUTPATIENT)
Dept: FAMILY MEDICINE CLINIC | Facility: CLINIC | Age: 63
End: 2023-07-28

## 2023-07-28 LAB
DME PARACHUTE DELIVERY DATE ACTUAL: NORMAL
DME PARACHUTE DELIVERY DATE EXPECTED: NORMAL
DME PARACHUTE DELIVERY DATE REQUESTED: NORMAL
DME PARACHUTE ITEM DESCRIPTION: NORMAL
DME PARACHUTE ORDER STATUS: NORMAL
DME PARACHUTE SUPPLIER NAME: NORMAL
DME PARACHUTE SUPPLIER PHONE: NORMAL
VIT B1 BLD-SCNC: 138.8 NMOL/L (ref 66.5–200)

## 2023-07-31 LAB

## 2023-08-04 DIAGNOSIS — J44.9 CHRONIC OBSTRUCTIVE PULMONARY DISEASE (HCC): ICD-10-CM

## 2023-08-04 DIAGNOSIS — R09.02 OXYGEN DESATURATION: ICD-10-CM

## 2023-08-08 DIAGNOSIS — J20.9 ACUTE BRONCHITIS, UNSPECIFIED ORGANISM: ICD-10-CM

## 2023-08-08 DIAGNOSIS — E78.5 HLD (HYPERLIPIDEMIA): ICD-10-CM

## 2023-08-08 RX ORDER — ATORVASTATIN CALCIUM 40 MG/1
40 TABLET, FILM COATED ORAL
Qty: 30 TABLET | Refills: 0 | Status: SHIPPED | OUTPATIENT
Start: 2023-08-08

## 2023-08-08 RX ORDER — ALBUTEROL SULFATE 90 UG/1
AEROSOL, METERED RESPIRATORY (INHALATION)
Qty: 18 G | Refills: 0 | Status: SHIPPED | OUTPATIENT
Start: 2023-08-08

## 2023-08-10 RX ORDER — ALBUTEROL SULFATE 2.5 MG/3ML
2.5 SOLUTION RESPIRATORY (INHALATION) EVERY 6 HOURS PRN
Qty: 75 ML | OUTPATIENT
Start: 2023-08-10 | End: 2023-09-09

## 2023-08-14 ENCOUNTER — OFFICE VISIT (OUTPATIENT)
Dept: FAMILY MEDICINE CLINIC | Facility: CLINIC | Age: 63
End: 2023-08-14
Payer: COMMERCIAL

## 2023-08-14 VITALS
TEMPERATURE: 97.9 F | BODY MASS INDEX: 22.56 KG/M2 | HEART RATE: 73 BPM | HEIGHT: 58 IN | WEIGHT: 107.5 LBS | RESPIRATION RATE: 21 BRPM | DIASTOLIC BLOOD PRESSURE: 62 MMHG | OXYGEN SATURATION: 95 % | SYSTOLIC BLOOD PRESSURE: 108 MMHG

## 2023-08-14 DIAGNOSIS — E03.9 ACQUIRED HYPOTHYROIDISM: ICD-10-CM

## 2023-08-14 DIAGNOSIS — Z12.31 ENCOUNTER FOR SCREENING MAMMOGRAM FOR BREAST CANCER: ICD-10-CM

## 2023-08-14 DIAGNOSIS — R20.2 NUMBNESS AND TINGLING IN BOTH HANDS: ICD-10-CM

## 2023-08-14 DIAGNOSIS — Z12.11 SCREENING FOR COLORECTAL CANCER: ICD-10-CM

## 2023-08-14 DIAGNOSIS — Z11.59 NEED FOR HEPATITIS C SCREENING TEST: ICD-10-CM

## 2023-08-14 DIAGNOSIS — Z72.0 NICOTINE ABUSE: ICD-10-CM

## 2023-08-14 DIAGNOSIS — Z11.4 ENCOUNTER FOR SCREENING FOR HIV: ICD-10-CM

## 2023-08-14 DIAGNOSIS — R09.81 NASAL CONGESTION: ICD-10-CM

## 2023-08-14 DIAGNOSIS — Z91.81 HISTORY OF RECENT FALL: ICD-10-CM

## 2023-08-14 DIAGNOSIS — K59.03 DRUG-INDUCED CONSTIPATION: ICD-10-CM

## 2023-08-14 DIAGNOSIS — R20.0 NUMBNESS AND TINGLING IN BOTH HANDS: ICD-10-CM

## 2023-08-14 DIAGNOSIS — Z12.12 SCREENING FOR COLORECTAL CANCER: ICD-10-CM

## 2023-08-14 DIAGNOSIS — Z00.00 MEDICARE ANNUAL WELLNESS VISIT, SUBSEQUENT: Primary | ICD-10-CM

## 2023-08-14 PROBLEM — E83.42 HYPOMAGNESEMIA: Status: RESOLVED | Noted: 2023-07-23 | Resolved: 2023-08-14

## 2023-08-14 PROBLEM — E87.6 HYPOKALEMIA: Status: RESOLVED | Noted: 2023-05-31 | Resolved: 2023-08-14

## 2023-08-14 PROCEDURE — G0439 PPPS, SUBSEQ VISIT: HCPCS | Performed by: FAMILY MEDICINE

## 2023-08-14 RX ORDER — GABAPENTIN 300 MG/1
300 CAPSULE ORAL 3 TIMES DAILY
Qty: 90 CAPSULE | Refills: 0 | Status: SHIPPED | OUTPATIENT
Start: 2023-08-14

## 2023-08-14 RX ORDER — CETIRIZINE HYDROCHLORIDE 10 MG/1
10 TABLET, CHEWABLE ORAL DAILY
Qty: 90 TABLET | Refills: 2 | Status: SHIPPED | OUTPATIENT
Start: 2023-08-14

## 2023-08-14 RX ORDER — ALBUTEROL SULFATE 2.5 MG/3ML
3 SOLUTION RESPIRATORY (INHALATION) EVERY 6 HOURS PRN
COMMUNITY
Start: 2023-08-07 | End: 2023-08-14 | Stop reason: ALTCHOICE

## 2023-08-14 NOTE — PATIENT INSTRUCTIONS
Medicare Preventive Visit Patient Instructions  Thank you for completing your Welcome to Medicare Visit or Medicare Annual Wellness Visit today. Your next wellness visit will be due in one year (8/14/2024). The screening/preventive services that you may require over the next 5-10 years are detailed below. Some tests may not apply to you based off risk factors and/or age. Screening tests ordered at today's visit but not completed yet may show as past due. Also, please note that scanned in results may not display below. Preventive Screenings:  Service Recommendations Previous Testing/Comments   Colorectal Cancer Screening  * Colonoscopy    * Fecal Occult Blood Test (FOBT)/Fecal Immunochemical Test (FIT)  * Fecal DNA/Cologuard Test  * Flexible Sigmoidoscopy Age: 43-73 years old   Colonoscopy: every 10 years (may be performed more frequently if at higher risk)  OR  FOBT/FIT: every 1 year  OR  Cologuard: every 3 years  OR  Sigmoidoscopy: every 5 years  Screening may be recommended earlier than age 39 if at higher risk for colorectal cancer. Also, an individualized decision between you and your healthcare provider will decide whether screening between the ages of 77-80 would be appropriate. Colonoscopy: Not on file  FOBT/FIT: Not on file  Cologuard: Not on file  Sigmoidoscopy: Not on file          Breast Cancer Screening Age: 36 years old  Frequency: every 1-2 years  Not required if history of left and right mastectomy Mammogram: 08/15/2022    Screening Current   Cervical Cancer Screening Between the ages of 21-29, pap smear recommended once every 3 years. Between the ages of 32-69, can perform pap smear with HPV co-testing every 5 years.    Recommendations may differ for women with a history of total hysterectomy, cervical cancer, or abnormal pap smears in past. Pap Smear: Not on file        Hepatitis C Screening Once for adults born between 1945 and 1965  More frequently in patients at high risk for Hepatitis C Hep C Antibody: Not on file        Diabetes Screening 1-2 times per year if you're at risk for diabetes or have pre-diabetes Fasting glucose: No results in last 5 years (No results in last 5 years)  A1C: 5.3 % (5/31/2023)  Screening Current   Cholesterol Screening Once every 5 years if you don't have a lipid disorder. May order more often based on risk factors. Lipid panel: 06/01/2023    Screening Not Indicated  History Lipid Disorder     Other Preventive Screenings Covered by Medicare:  1. Abdominal Aortic Aneurysm (AAA) Screening: covered once if your at risk. You're considered to be at risk if you have a family history of AAA. 2. Lung Cancer Screening: covers low dose CT scan once per year if you meet all of the following conditions: (1) Age 48-67; (2) No signs or symptoms of lung cancer; (3) Current smoker or have quit smoking within the last 15 years; (4) You have a tobacco smoking history of at least 20 pack years (packs per day multiplied by number of years you smoked); (5) You get a written order from a healthcare provider. 3. Glaucoma Screening: covered annually if you're considered high risk: (1) You have diabetes OR (2) Family history of glaucoma OR (3)  aged 48 and older OR (3)  American aged 72 and older  3. Osteoporosis Screening: covered every 2 years if you meet one of the following conditions: (1) You're estrogen deficient and at risk for osteoporosis based off medical history and other findings; (2) Have a vertebral abnormality; (3) On glucocorticoid therapy for more than 3 months; (4) Have primary hyperparathyroidism; (5) On osteoporosis medications and need to assess response to drug therapy. · Last bone density test (DXA Scan): Not on file. 5. HIV Screening: covered annually if you're between the age of 14-79. Also covered annually if you are younger than 13 and older than 72 with risk factors for HIV infection.  For pregnant patients, it is covered up to 3 times per pregnancy. Immunizations:  Immunization Recommendations   Influenza Vaccine Annual influenza vaccination during flu season is recommended for all persons aged >= 6 months who do not have contraindications   Pneumococcal Vaccine   * Pneumococcal conjugate vaccine = PCV13 (Prevnar 13), PCV15 (Vaxneuvance), PCV20 (Prevnar 20)  * Pneumococcal polysaccharide vaccine = PPSV23 (Pneumovax) Adults 20-63 years old: 1-3 doses may be recommended based on certain risk factors  Adults 72 years old: 1-2 doses may be recommended based off what pneumonia vaccine you previously received   Hepatitis B Vaccine 3 dose series if at intermediate or high risk (ex: diabetes, end stage renal disease, liver disease)   Tetanus (Td) Vaccine - COST NOT COVERED BY MEDICARE PART B Following completion of primary series, a booster dose should be given every 10 years to maintain immunity against tetanus. Td may also be given as tetanus wound prophylaxis. Tdap Vaccine - COST NOT COVERED BY MEDICARE PART B Recommended at least once for all adults. For pregnant patients, recommended with each pregnancy. Shingles Vaccine (Shingrix) - COST NOT COVERED BY MEDICARE PART B  2 shot series recommended in those aged 48 and above     Health Maintenance Due:      Topic Date Due   • Hepatitis C Screening  Never done   • HIV Screening  Never done   • Cervical Cancer Screening  Never done   • Colorectal Cancer Screening  Never done   • Breast Cancer Screening: Mammogram  08/15/2023     Immunizations Due:      Topic Date Due   • Hepatitis A Vaccine (1 of 2 - Risk 2-dose series) Never done   • COVID-19 Vaccine (2 - Moderna series) 06/22/2021   • Hepatitis B Vaccine (1 of 3 - Risk 3-dose series) Never done   • Influenza Vaccine (1) 09/01/2023     Advance Directives   What are advance directives? Advance directives are legal documents that state your wishes and plans for medical care.  These plans are made ahead of time in case you lose your ability to make decisions for yourself. Advance directives can apply to any medical decision, such as the treatments you want, and if you want to donate organs. What are the types of advance directives? There are many types of advance directives, and each state has rules about how to use them. You may choose a combination of any of the following:  · Living will: This is a written record of the treatment you want. You can also choose which treatments you do not want, which to limit, and which to stop at a certain time. This includes surgery, medicine, IV fluid, and tube feedings. · Durable power of  for healthcare [facility]): This is a written record that states who you want to make healthcare choices for you when you are unable to make them for yourself. This person, called a proxy, is usually a family member or a friend. You may choose more than 1 proxy. · Do not resuscitate (DNR) order:  A DNR order is used in case your heart stops beating or you stop breathing. It is a request not to have certain forms of treatment, such as CPR. A DNR order may be included in other types of advance directives. · Medical directive: This covers the care that you want if you are in a coma, near death, or unable to make decisions for yourself. You can list the treatments you want for each condition. Treatment may include pain medicine, surgery, blood transfusions, dialysis, IV or tube feedings, and a ventilator (breathing machine). · Values history: This document has questions about your views, beliefs, and how you feel and think about life. This information can help others choose the care that you would choose. Why are advance directives important? An advance directive helps you control your care. Although spoken wishes may be used, it is better to have your wishes written down. Spoken wishes can be misunderstood, or not followed. Treatments may be given even if you do not want them.  An advance directive may make it easier for your family to make difficult choices about your care. Cigarette Smoking and Your Health   Risks to your health if you smoke:  Nicotine and other chemicals found in tobacco damage every cell in your body. Even if you are a light smoker, you have an increased risk for cancer, heart disease, and lung disease. If you are pregnant or have diabetes, smoking increases your risk for complications. Benefits to your health if you stop smoking:   · You decrease respiratory symptoms such as coughing, wheezing, and shortness of breath. · You reduce your risk for cancers of the lung, mouth, throat, kidney, bladder, pancreas, stomach, and cervix. If you already have cancer, you increase the benefits of chemotherapy. You also reduce your risk for cancer returning or a second cancer from developing. · You reduce your risk for heart disease, blood clots, heart attack, and stroke. · You reduce your risk for lung infections, and diseases such as pneumonia, asthma, chronic bronchitis, and emphysema. · Your circulation improves. More oxygen can be delivered to your body. If you have diabetes, you lower your risk for complications, such as kidney, artery, and eye diseases. You also lower your risk for nerve damage. Nerve damage can lead to amputations, poor vision, and blindness. · You improve your body's ability to heal and to fight infections. For more information and support to stop smoking:   · Smokefree. gov  Phone: 4- 924 - 640-7221  Web Address: www.[employer]. [employer] [address] 2018 Information is for End User's use only and may not be sold, redistributed or otherwise used for commercial purposes. All illustrations and images included in CareNotes® are the copyrighted property of [employer] Inc. or [facility] Preventive Visit Patient Instructions  Thank you for completing your Welcome to Medicare Visit or Medicare Annual Wellness Visit today.  Your next wellness visit will be due in one year (8/14/2024). The screening/preventive services that you may require over the next 5-10 years are detailed below. Some tests may not apply to you based off risk factors and/or age. Screening tests ordered at today's visit but not completed yet may show as past due. Also, please note that scanned in results may not display below. Preventive Screenings:  Service Recommendations Previous Testing/Comments   Colorectal Cancer Screening  * Colonoscopy    * Fecal Occult Blood Test (FOBT)/Fecal Immunochemical Test (FIT)  * Fecal DNA/Cologuard Test  * Flexible Sigmoidoscopy Age: 43-73 years old   Colonoscopy: every 10 years (may be performed more frequently if at higher risk)  OR  FOBT/FIT: every 1 year  OR  Cologuard: every 3 years  OR  Sigmoidoscopy: every 5 years  Screening may be recommended earlier than age 39 if at higher risk for colorectal cancer. Also, an individualized decision between you and your healthcare provider will decide whether screening between the ages of 77-80 would be appropriate. Colonoscopy: Not on file  FOBT/FIT: Not on file  Cologuard: Not on file  Sigmoidoscopy: Not on file          Breast Cancer Screening Age: 36 years old  Frequency: every 1-2 years  Not required if history of left and right mastectomy Mammogram: 08/15/2022    Screening Current   Cervical Cancer Screening Between the ages of 21-29, pap smear recommended once every 3 years. Between the ages of 32-69, can perform pap smear with HPV co-testing every 5 years.    Recommendations may differ for women with a history of total hysterectomy, cervical cancer, or abnormal pap smears in past. Pap Smear: Not on file        Hepatitis C Screening Once for adults born between 1945 and 1965  More frequently in patients at high risk for Hepatitis C Hep C Antibody: Not on file        Diabetes Screening 1-2 times per year if you're at risk for diabetes or have pre-diabetes Fasting glucose: No results in last 5 years (No results in last 5 years)  A1C: 5.3 % (5/31/2023)  Screening Current   Cholesterol Screening Once every 5 years if you don't have a lipid disorder. May order more often based on risk factors. Lipid panel: 06/01/2023    Screening Not Indicated  History Lipid Disorder     Other Preventive Screenings Covered by Medicare:  6. Abdominal Aortic Aneurysm (AAA) Screening: covered once if your at risk. You're considered to be at risk if you have a family history of AAA. 7. Lung Cancer Screening: covers low dose CT scan once per year if you meet all of the following conditions: (1) Age 48-67; (2) No signs or symptoms of lung cancer; (3) Current smoker or have quit smoking within the last 15 years; (4) You have a tobacco smoking history of at least 20 pack years (packs per day multiplied by number of years you smoked); (5) You get a written order from a healthcare provider. 8. Glaucoma Screening: covered annually if you're considered high risk: (1) You have diabetes OR (2) Family history of glaucoma OR (3)  aged 48 and older OR (3)  American aged 72 and older  5. Osteoporosis Screening: covered every 2 years if you meet one of the following conditions: (1) You're estrogen deficient and at risk for osteoporosis based off medical history and other findings; (2) Have a vertebral abnormality; (3) On glucocorticoid therapy for more than 3 months; (4) Have primary hyperparathyroidism; (5) On osteoporosis medications and need to assess response to drug therapy. · Last bone density test (DXA Scan): Not on file. 10. HIV Screening: covered annually if you're between the age of 14-79. Also covered annually if you are younger than 13 and older than 72 with risk factors for HIV infection. For pregnant patients, it is covered up to 3 times per pregnancy.     Immunizations:  Immunization Recommendations   Influenza Vaccine Annual influenza vaccination during flu season is recommended for all persons aged >= 6 months who do not have contraindications   Pneumococcal Vaccine   * Pneumococcal conjugate vaccine = PCV13 (Prevnar 13), PCV15 (Vaxneuvance), PCV20 (Prevnar 20)  * Pneumococcal polysaccharide vaccine = PPSV23 (Pneumovax) Adults 2364 years old: 1-3 doses may be recommended based on certain risk factors  Adults 72 years old: 1-2 doses may be recommended based off what pneumonia vaccine you previously received   Hepatitis B Vaccine 3 dose series if at intermediate or high risk (ex: diabetes, end stage renal disease, liver disease)   Tetanus (Td) Vaccine - COST NOT COVERED BY MEDICARE PART B Following completion of primary series, a booster dose should be given every 10 years to maintain immunity against tetanus. Td may also be given as tetanus wound prophylaxis. Tdap Vaccine - COST NOT COVERED BY MEDICARE PART B Recommended at least once for all adults. For pregnant patients, recommended with each pregnancy. Shingles Vaccine (Shingrix) - COST NOT COVERED BY MEDICARE PART B  2 shot series recommended in those aged 48 and above     Health Maintenance Due:      Topic Date Due   • Hepatitis C Screening  Never done   • HIV Screening  Never done   • Cervical Cancer Screening  Never done   • Colorectal Cancer Screening  Never done   • Breast Cancer Screening: Mammogram  08/15/2023     Immunizations Due:      Topic Date Due   • Hepatitis A Vaccine (1 of 2 - Risk 2-dose series) Never done   • COVID-19 Vaccine (2 - Moderna series) 06/22/2021   • Hepatitis B Vaccine (1 of 3 - Risk 3-dose series) Never done   • Influenza Vaccine (1) 09/01/2023     Advance Directives   What are advance directives? Advance directives are legal documents that state your wishes and plans for medical care. These plans are made ahead of time in case you lose your ability to make decisions for yourself. Advance directives can apply to any medical decision, such as the treatments you want, and if you want to donate organs. What are the types of advance directives? There are many types of advance directives, and each state has rules about how to use them. You may choose a combination of any of the following:  · Living will: This is a written record of the treatment you want. You can also choose which treatments you do not want, which to limit, and which to stop at a certain time. This includes surgery, medicine, IV fluid, and tube feedings. · Durable power of  for healthcare [facility]): This is a written record that states who you want to make healthcare choices for you when you are unable to make them for yourself. This person, called a proxy, is usually a family member or a friend. You may choose more than 1 proxy. · Do not resuscitate (DNR) order:  A DNR order is used in case your heart stops beating or you stop breathing. It is a request not to have certain forms of treatment, such as CPR. A DNR order may be included in other types of advance directives. · Medical directive: This covers the care that you want if you are in a coma, near death, or unable to make decisions for yourself. You can list the treatments you want for each condition. Treatment may include pain medicine, surgery, blood transfusions, dialysis, IV or tube feedings, and a ventilator (breathing machine). · Values history: This document has questions about your views, beliefs, and how you feel and think about life. This information can help others choose the care that you would choose. Why are advance directives important? An advance directive helps you control your care. Although spoken wishes may be used, it is better to have your wishes written down. Spoken wishes can be misunderstood, or not followed. Treatments may be given even if you do not want them. An advance directive may make it easier for your family to make difficult choices about your care.    Cigarette Smoking and Your Health   Risks to your health if you smoke:  Nicotine and other chemicals found in tobacco damage every cell in your body. Even if you are a light smoker, you have an increased risk for cancer, heart disease, and lung disease. If you are pregnant or have diabetes, smoking increases your risk for complications. Benefits to your health if you stop smoking:   · You decrease respiratory symptoms such as coughing, wheezing, and shortness of breath. · You reduce your risk for cancers of the lung, mouth, throat, kidney, bladder, pancreas, stomach, and cervix. If you already have cancer, you increase the benefits of chemotherapy. You also reduce your risk for cancer returning or a second cancer from developing. · You reduce your risk for heart disease, blood clots, heart attack, and stroke. · You reduce your risk for lung infections, and diseases such as pneumonia, asthma, chronic bronchitis, and emphysema. · Your circulation improves. More oxygen can be delivered to your body. If you have diabetes, you lower your risk for complications, such as kidney, artery, and eye diseases. You also lower your risk for nerve damage. Nerve damage can lead to amputations, poor vision, and blindness. · You improve your body's ability to heal and to fight infections. For more information and support to stop smoking:   · Smokefree. gov  Phone: 3- 531 - 717-3711  Web Address: www.[employer]. [employer] [address] 2018 Information is for End User's use only and may not be sold, redistributed or otherwise used for commercial purposes. All illustrations and images included in CareNotes® are the copyrighted property of [employer] or [facility] Preventive Visit Patient Instructions  Thank you for completing your Welcome to Medicare Visit or Medicare Annual Wellness Visit today. Your next wellness visit will be due in one year (8/14/2024). The screening/preventive services that you may require over the next 5-10 years are detailed below. Some tests may not apply to you based off risk factors and/or age. Screening tests ordered at today's visit but not completed yet may show as past due. Also, please note that scanned in results may not display below. Preventive Screenings:  Service Recommendations Previous Testing/Comments   Colorectal Cancer Screening  * Colonoscopy    * Fecal Occult Blood Test (FOBT)/Fecal Immunochemical Test (FIT)  * Fecal DNA/Cologuard Test  * Flexible Sigmoidoscopy Age: 43-73 years old   Colonoscopy: every 10 years (may be performed more frequently if at higher risk)  OR  FOBT/FIT: every 1 year  OR  Cologuard: every 3 years  OR  Sigmoidoscopy: every 5 years  Screening may be recommended earlier than age 39 if at higher risk for colorectal cancer. Also, an individualized decision between you and your healthcare provider will decide whether screening between the ages of 77-80 would be appropriate. Colonoscopy: Not on file  FOBT/FIT: Not on file  Cologuard: Not on file  Sigmoidoscopy: Not on file          Breast Cancer Screening Age: 36 years old  Frequency: every 1-2 years  Not required if history of left and right mastectomy Mammogram: 08/15/2022    Screening Current   Cervical Cancer Screening Between the ages of 21-29, pap smear recommended once every 3 years. Between the ages of 32-69, can perform pap smear with HPV co-testing every 5 years. Recommendations may differ for women with a history of total hysterectomy, cervical cancer, or abnormal pap smears in past. Pap Smear: Not on file        Hepatitis C Screening Once for adults born between 1945 and 1965  More frequently in patients at high risk for Hepatitis C Hep C Antibody: Not on file        Diabetes Screening 1-2 times per year if you're at risk for diabetes or have pre-diabetes Fasting glucose: No results in last 5 years (No results in last 5 years)  A1C: 5.3 % (5/31/2023)  Screening Current   Cholesterol Screening Once every 5 years if you don't have a lipid disorder. May order more often based on risk factors.  Lipid panel: 06/01/2023    Screening Not Indicated  History Lipid Disorder     Other Preventive Screenings Covered by Medicare:  11. Abdominal Aortic Aneurysm (AAA) Screening: covered once if your at risk. You're considered to be at risk if you have a family history of AAA. 12. Lung Cancer Screening: covers low dose CT scan once per year if you meet all of the following conditions: (1) Age 48-67; (2) No signs or symptoms of lung cancer; (3) Current smoker or have quit smoking within the last 15 years; (4) You have a tobacco smoking history of at least 20 pack years (packs per day multiplied by number of years you smoked); (5) You get a written order from a healthcare provider. 15. Glaucoma Screening: covered annually if you're considered high risk: (1) You have diabetes OR (2) Family history of glaucoma OR (3)  aged 48 and older OR (3)  American aged 72 and older  15. Osteoporosis Screening: covered every 2 years if you meet one of the following conditions: (1) You're estrogen deficient and at risk for osteoporosis based off medical history and other findings; (2) Have a vertebral abnormality; (3) On glucocorticoid therapy for more than 3 months; (4) Have primary hyperparathyroidism; (5) On osteoporosis medications and need to assess response to drug therapy. · Last bone density test (DXA Scan): Not on file. 15. HIV Screening: covered annually if you're between the age of 14-79. Also covered annually if you are younger than 13 and older than 72 with risk factors for HIV infection. For pregnant patients, it is covered up to 3 times per pregnancy.     Immunizations:  Immunization Recommendations   Influenza Vaccine Annual influenza vaccination during flu season is recommended for all persons aged >= 6 months who do not have contraindications   Pneumococcal Vaccine   * Pneumococcal conjugate vaccine = PCV13 (Prevnar 13), PCV15 (Vaxneuvance), PCV20 (Prevnar 20)  * Pneumococcal polysaccharide vaccine = PPSV23 (Pneumovax) Adults 2364 years old: 1-3 doses may be recommended based on certain risk factors  Adults 72 years old: 1-2 doses may be recommended based off what pneumonia vaccine you previously received   Hepatitis B Vaccine 3 dose series if at intermediate or high risk (ex: diabetes, end stage renal disease, liver disease)   Tetanus (Td) Vaccine - COST NOT COVERED BY MEDICARE PART B Following completion of primary series, a booster dose should be given every 10 years to maintain immunity against tetanus. Td may also be given as tetanus wound prophylaxis. Tdap Vaccine - COST NOT COVERED BY MEDICARE PART B Recommended at least once for all adults. For pregnant patients, recommended with each pregnancy. Shingles Vaccine (Shingrix) - COST NOT COVERED BY MEDICARE PART B  2 shot series recommended in those aged 48 and above     Health Maintenance Due:      Topic Date Due   • Hepatitis C Screening  Never done   • HIV Screening  Never done   • Cervical Cancer Screening  Never done   • Colorectal Cancer Screening  Never done   • Breast Cancer Screening: Mammogram  08/15/2023     Immunizations Due:      Topic Date Due   • Hepatitis A Vaccine (1 of 2 - Risk 2-dose series) Never done   • COVID-19 Vaccine (2 - Moderna series) 06/22/2021   • Hepatitis B Vaccine (1 of 3 - Risk 3-dose series) Never done   • Influenza Vaccine (1) 09/01/2023     Advance Directives   What are advance directives? Advance directives are legal documents that state your wishes and plans for medical care. These plans are made ahead of time in case you lose your ability to make decisions for yourself. Advance directives can apply to any medical decision, such as the treatments you want, and if you want to donate organs. What are the types of advance directives? There are many types of advance directives, and each state has rules about how to use them. You may choose a combination of any of the following:  · Living will:   This is a written record of the treatment you want. You can also choose which treatments you do not want, which to limit, and which to stop at a certain time. This includes surgery, medicine, IV fluid, and tube feedings. · Durable power of  for [facility]): This is a written record that states who you want to make healthcare choices for you when you are unable to make them for yourself. This person, called a proxy, is usually a family member or a friend. You may choose more than 1 proxy. · Do not resuscitate (DNR) order:  A DNR order is used in case your heart stops beating or you stop breathing. It is a request not to have certain forms of treatment, such as CPR. A DNR order may be included in other types of advance directives. · Medical directive: This covers the care that you want if you are in a coma, near death, or unable to make decisions for yourself. You can list the treatments you want for each condition. Treatment may include pain medicine, surgery, blood transfusions, dialysis, IV or tube feedings, and a ventilator (breathing machine). · Values history: This document has questions about your views, beliefs, and how you feel and think about life. This information can help others choose the care that you would choose. Why are advance directives important? An advance directive helps you control your care. Although spoken wishes may be used, it is better to have your wishes written down. Spoken wishes can be misunderstood, or not followed. Treatments may be given even if you do not want them. An advance directive may make it easier for your family to make difficult choices about your care. Cigarette Smoking and Your Health   Risks to your health if you smoke:  Nicotine and other chemicals found in tobacco damage every cell in your body. Even if you are a light smoker, you have an increased risk for cancer, heart disease, and lung disease.  If you are pregnant or have diabetes, smoking increases your risk for complications. Benefits to your health if you stop smoking:   · You decrease respiratory symptoms such as coughing, wheezing, and shortness of breath. · You reduce your risk for cancers of the lung, mouth, throat, kidney, bladder, pancreas, stomach, and cervix. If you already have cancer, you increase the benefits of chemotherapy. You also reduce your risk for cancer returning or a second cancer from developing. · You reduce your risk for heart disease, blood clots, heart attack, and stroke. · You reduce your risk for lung infections, and diseases such as pneumonia, asthma, chronic bronchitis, and emphysema. · Your circulation improves. More oxygen can be delivered to your body. If you have diabetes, you lower your risk for complications, such as kidney, artery, and eye diseases. You also lower your risk for nerve damage. Nerve damage can lead to amputations, poor vision, and blindness. · You improve your body's ability to heal and to fight infections. For more information and support to stop smoking:   · [employer]. gov  Phone: 6- 916 - 260-3527  Web Address: www.[employer]. [address] [place] [address] 2018 Information is for End User's use only and may not be sold, redistributed or otherwise used for commercial purposes.  All illustrations and images included in CareNotes® are the copyrighted property of A.D.A.M., Inc. or  [employer]

## 2023-08-14 NOTE — ASSESSMENT & PLAN NOTE
Acute, started 4 months ago and worsened 2 months ago. Possible worsening of current neuropathy. Pt was on gabapentin 300mg BID  for neuropathic pain due to impinged nerve 2/2 scoliosis. will increase her gabapetin to 300mg TID. will follow up in 1 month as a separate visit to investigate further.

## 2023-08-14 NOTE — ASSESSMENT & PLAN NOTE
Pt is down to 1/4 pack a day. Pt is using nicotine candies to help with smoke cessation. Pt was counseled again on the importance of smoke cessation.

## 2023-08-14 NOTE — PROGRESS NOTES
Assessment and Plan:     Problem List Items Addressed This Visit        Endocrine    Hypothyroid     Will check TSH to see if we need to adjust levothyroxine          Relevant Orders    TSH, 3rd generation with Free T4 reflex       Other    Nicotine abuse     Pt is down to 1/4 pack a day. Pt is using nicotine candies to help with smoke cessation. Pt was counseled again on the importance of smoke cessation. Numbness and tingling in both hands     Acute, started 4 months ago and worsened 2 months ago. Possible worsening of current neuropathy. Pt was on gabapentin 300mg BID  for neuropathic pain due to impinged nerve 2/2 scoliosis. will increase her gabapetin to 300mg TID. will follow up in 1 month as a separate visit to investigate further. Relevant Medications    gabapentin (NEURONTIN) 300 mg capsule   Other Visit Diagnoses     Medicare annual wellness visit, subsequent    -  Primary    Encounter for screening mammogram for breast cancer        Relevant Orders    Mammo screening bilateral w 3d & cad    History of recent fall        Pt has a recent fall at work, tripped over a left-out object. Pt  at the work and took care of loose objects on the ground. Screening for colorectal cancer        Relevant Orders    Ambulatory referral to Gastroenterology    Nasal congestion        Relevant Medications    cetirizine (ZyrTEC) 10 MG chewable tablet    Drug-induced constipation        Pt states that her iron tablet is giving her constipation. advised Pt to take iron tablet every other day instead. Need for hepatitis C screening test        Relevant Orders    Hepatitis C antibody    Encounter for screening for HIV        Relevant Orders    HIV 1/2 AB/AG w Reflex [facility] for 2 yr old and above           Preventive health issues were discussed with patient, and age appropriate screening tests were ordered as noted in patient's After Visit Summary.   Personalized health advice and appropriate referrals for health education or preventive services given if needed, as noted in patient's After Visit Summary. History of Present Illness:     Patient presents for a Medicare Wellness Visit    Numbness and tingling of hand started 4 months ago. It started with left hand and right hand started 2 months ago which is gradually worsening. Patient Care Team:  Genesis Szymanski MD as PCP - General (Family Medicine)  Kaylee Vanegas MD as PCP - [address] [place] [address] (RTE)  [facility]     Review of Systems:     Review of Systems   Constitutional: Negative for chills and fever. HENT: Negative for ear pain and sore throat. Eyes: Negative for pain and visual disturbance. Respiratory: Negative for cough and shortness of breath. Cardiovascular: Negative for chest pain and palpitations. Gastrointestinal: Positive for constipation. Negative for abdominal pain, diarrhea, nausea and vomiting. Genitourinary: Negative for dysuria and hematuria. Musculoskeletal: Negative for arthralgias, back pain, neck pain and neck stiffness. Skin: Negative for color change and rash. Neurological: Positive for numbness (bilateral hands with tingling). Negative for dizziness, weakness and headaches. Psychiatric/Behavioral: Negative for agitation and confusion. The patient is not nervous/anxious. All other systems reviewed and are negative.        Problem List:     Patient Active Problem List   Diagnosis   • Chronic obstructive pulmonary disease (HCC)   • SVT (supraventricular tachycardia) (HCC)   • Depression with anxiety   • Nicotine abuse   • Anemia   • Chronic low back pain   • Hypothyroid   • Seborrheic keratoses, inflamed   • Hypotension   • Numbness and tingling in both hands      Past Medical and Surgical History:     Past Medical History:   Diagnosis Date   • Anxiety    • Depression    • Disease of thyroid gland    • Elevated troponin 5/31/2023   • History of alcohol abuse 5/31/2023 • Substance abuse [facility])      Past Surgical History:   Procedure Laterality Date   • BACK SURGERY  01/01/1973    scolosis rods   • CARDIAC CATHETERIZATION N/A 7/25/2023    Procedure: Cardiac catheterization;  Surgeon: Aditya Batista MD;  Location: [address] [place] [address] CATH LAB; Service: Cardiology   • ELBOW SURGERY  05/01/2018   • WISDOM TOOTH EXTRACTION        Family History:     Family History   Problem Relation Age of Onset   • Colon cancer Mother    • Breast cancer Mother    • Cancer Mother    • Leukemia Father       Social History:     Social History     Socioeconomic History   • Marital status:      Spouse name: None   • Number of children: None   • Years of education: None   • Highest education level: None   Occupational History   • None   Tobacco Use   • Smoking status: Some Days     Packs/day: 1.50     Years: 30.00     Total pack years: 45.00     Types: Cigarettes   • Smokeless tobacco: Never   Substance and Sexual Activity   • Alcohol use: Not Currently     Comment: none 7/2018-went to rehab   • Drug use: Not Currently   • Sexual activity: None   Other Topics Concern   • None   Social History Narrative   • None     Social Determinants of Health     Financial Resource Strain: Low Risk  (8/14/2023)    Overall Financial Resource Strain (CARDIA)    • Difficulty of Paying Living Expenses: Not very hard   Food Insecurity: No Food Insecurity (7/24/2023)    Hunger Vital Sign    • Worried About Running Out of Food in the Last Year: Never true    • Ran Out of Food in the Last Year: Never true   Transportation Needs: No Transportation Needs (8/14/2023)    PRAPARE - Transportation    • Lack of Transportation (Medical): No    • Lack of Transportation (Non-Medical):  No   Physical Activity: Not on file   Stress: Not on file   Social Connections: Not on file   Intimate Partner Violence: Not on file   Housing Stability: Low Risk  (7/24/2023)    Housing Stability Vital Sign    • Unable to Pay for Housing in the Last Year: No    • Number of Places Lived in the Last Year: 1    • Unstable Housing in the Last Year: No      Medications and Allergies:     Current Outpatient Medications   Medication Sig Dispense Refill   • atorvastatin (LIPITOR) 40 mg tablet TAKE 1 TABLET (40 MG TOTAL) BY MOUTH DAILY WITH DINNER 30 tablet 0   • cetirizine (ZyrTEC) 10 MG chewable tablet Chew 1 tablet (10 mg total) daily 90 tablet 2   • docusate sodium (COLACE) 100 mg capsule Take 1 capsule (100 mg total) by mouth daily Pt verbalized she takes this once daily     • ferrous sulfate 324 (65 Fe) mg Take 1 tablet (324 mg total) by mouth daily before breakfast 30 tablet 0   • fluticasone (FLONASE) 50 mcg/act nasal spray 1 SPRAY INTO EACH NOSTRIL DAILY 16 g 3   • gabapentin (NEURONTIN) 300 mg capsule Take 1 capsule (300 mg total) by mouth 3 (three) times a day 90 capsule 0   • hydrOXYzine HCL (ATARAX) 25 mg tablet Take 25 mg by mouth 3 (three) times a day as needed     • levalbuterol (Xopenex) 1.25 mg/3 mL nebulizer solution Take 3 mL (1.25 mg total) by nebulization 3 (three) times a day as needed for wheezing or shortness of breath 270 mL 0   • levothyroxine 100 mcg tablet Take 1 tablet (100 mcg total) by mouth daily in the early morning 60 tablet 0   • metoprolol tartrate (LOPRESSOR) 25 mg tablet Take 0.5 tablets (12.5 mg total) by mouth every 12 (twelve) hours 60 tablet 0   • multivitamin (THERAGRAN) TABS Take 1 tablet by mouth daily     • polyethylene glycol (MIRALAX) 17 g packet Take 17 g by mouth daily as needed (constipation) 20 each 0   • QUEtiapine (SEROquel) 25 mg tablet Take 25 mg by mouth daily at bedtime as needed (for sleep)     • umeclidinium 62.5 mcg/actuation AEPB inhaler Inhale 1 puff daily Do not start before July 28, 2023. 30 blister 1   • Ventolin  (90 Base) MCG/ACT inhaler INHALE 2 PUFFS EVERY 6 (SIX) HOURS AS NEEDED FOR WHEEZING OR SHORTNESS OF BREATH 18 g 0   • vitamin B-12 (VITAMIN B-12) 1,000 mcg tablet Take 1 tablet (1,000 mcg total) by mouth daily 30 tablet 0     No current facility-administered medications for this visit. Allergies   Allergen Reactions   • Other Allergic Rhinitis     seasonal      Immunizations:     Immunization History   Administered Date(s) Administered   • COVID-19 MODERNA VACC 0.5 ML IM 04/27/2021   • Influenza, injectable, quadrivalent, preservative free 0.5 mL 10/21/2020   • Pneumococcal Conjugate Vaccine 20-valent (Pcv20), Polysace 06/01/2023   • Pneumococcal Polysaccharide PPV23 01/09/2020   • Td (adult), adsorbed 05/28/2018      Health Maintenance:         Topic Date Due   • Hepatitis C Screening  Never done   • HIV Screening  Never done   • Cervical Cancer Screening  Never done   • Colorectal Cancer Screening  Never done   • Breast Cancer Screening: Mammogram  08/15/2023   • Lung Cancer Screening  07/26/2024         Topic Date Due   • Hepatitis A Vaccine (1 of 2 - Risk 2-dose series) Never done   • COVID-19 Vaccine (2 - Moderna series) 06/22/2021   • Hepatitis B Vaccine (1 of 3 - Risk 3-dose series) Never done   • Influenza Vaccine (1) 09/01/2023      Medicare Screening Tests and Risk Assessments:     Didier Yanez is here for her Subsequent Wellness visit. Health Risk Assessment:   Patient rates overall health as fair. Patient feels that their physical health rating is same. Patient is dissatisfied with their life. Eyesight was rated as same. Hearing was rated as same. Patient feels that their emotional and mental health rating is slightly better. Patients states they are never, rarely angry. Patient states they are sometimes unusually tired/fatigued. Pain experienced in the last 7 days has been some. Patient's pain rating has been 5/10. Patient states that she has experienced no weight loss or gain in last 6 months. Depression Screening:   PHQ-9 Score: 3      Fall Risk Screening:    In the past year, patient has experienced: history of falling in past year    Number of falls: 1  Injured during fall?: Yes Feels unsteady when standing or walking?: No    Worried about falling?: No      Urinary Incontinence Screening:   Patient has not leaked urine accidently in the last six months. Home Safety:  Patient does not have trouble with stairs inside or outside of their home. Patient has working smoke alarms and has working carbon monoxide detector. Home safety hazards include: none. Nutrition:   Current diet is Regular. Medications:   Patient is currently taking over-the-counter supplements. OTC medications include: see medication list. Patient is able to manage medications. Activities of Daily Living (ADLs)/Instrumental Activities of Daily Living (IADLs):   Walk and transfer into and out of bed and chair?: Yes  Dress and groom yourself?: Yes    Bathe or shower yourself?: Yes    Feed yourself? Yes  Do your laundry/housekeeping?: Yes  Manage your money, pay your bills and track your expenses?: Yes  Make your own meals?: Yes    Do your own shopping?: Yes    Previous Hospitalizations:   Any hospitalizations or ED visits within the last 12 months?: Yes    How many hospitalizations have you had in the last year?: 1-2    PREVENTIVE SCREENINGS      Cardiovascular Screening:    General: Screening Not Indicated and History Lipid Disorder      Diabetes Screening:     General: Screening Current      Breast Cancer Screening:     General: Screening Current      Lung Cancer Screening:     General: Screening Current    Screening, Brief Intervention, and Referral to Treatment (SBIRT)    Screening  Typical number of drinks in a day: 0  Typical number of drinks in a week: 0  Interpretation: Low risk drinking behavior. Single Item Drug Screening:  How often have you used an illegal drug (including marijuana) or a prescription medication for non-medical reasons in the past year? never    Single Item Drug Screen Score: 0  Interpretation: Negative screen for possible drug use disorder    No results found.      Physical Exam: /62 (BP Location: Left arm, Patient Position: Sitting, Cuff Size: Child)   Pulse 73   Temp 97.9 °F (36.6 °C) (Temporal)   Resp 21   Ht 4' 10" (1.473 m)   Wt 48.8 kg (107 lb 8 oz)   SpO2 95%   BMI 22.47 kg/m²     Physical Exam  Vitals reviewed. Constitutional:       General: She is not in acute distress. Appearance: Normal appearance. She is not ill-appearing. HENT:      Head: Normocephalic and atraumatic. Right Ear: Tympanic membrane, ear canal and external ear normal.      Left Ear: Tympanic membrane, ear canal and external ear normal.      Nose: Nose normal. No congestion or rhinorrhea. Mouth/Throat:      Mouth: Mucous membranes are moist.      Pharynx: Oropharynx is clear. No oropharyngeal exudate or posterior oropharyngeal erythema. Eyes:      General:         Right eye: No discharge. Left eye: No discharge. Extraocular Movements: Extraocular movements intact. Conjunctiva/sclera: Conjunctivae normal.      Pupils: Pupils are equal, round, and reactive to light. Cardiovascular:      Rate and Rhythm: Normal rate and regular rhythm. Pulses: Normal pulses. Heart sounds: Normal heart sounds. No murmur heard. No friction rub. No gallop. Pulmonary:      Effort: Pulmonary effort is normal. No respiratory distress. Breath sounds: Normal breath sounds. No stridor. No wheezing, rhonchi or rales. Chest:      Chest wall: No tenderness. Abdominal:      General: Abdomen is flat. Bowel sounds are normal. There is no distension. Palpations: Abdomen is soft. Tenderness: There is no abdominal tenderness. There is no guarding. Musculoskeletal:         General: No swelling, tenderness, deformity or signs of injury. Normal range of motion. Cervical back: Normal range of motion and neck supple. No deformity, signs of trauma or rigidity. Thoracic back: No deformity or signs of trauma. No scoliosis.       Lumbar back: No deformity or signs of trauma. No scoliosis. Right lower leg: No edema. Left lower leg: No edema. Skin:     General: Skin is warm and dry. Capillary Refill: Capillary refill takes less than 2 seconds. Findings: No bruising, erythema, lesion or rash. Neurological:      General: No focal deficit present. Mental Status: She is alert and oriented to person, place, and time. Motor: No weakness. Deep Tendon Reflexes: Reflexes normal.   Psychiatric:         Mood and Affect: Mood normal.         Behavior: Behavior normal.         Thought Content:  Thought content normal.          Rockford Crigler, MD

## 2023-08-15 DIAGNOSIS — R09.02 OXYGEN DESATURATION: ICD-10-CM

## 2023-08-15 DIAGNOSIS — J44.9 CHRONIC OBSTRUCTIVE PULMONARY DISEASE (HCC): ICD-10-CM

## 2023-08-18 DIAGNOSIS — J44.9 CHRONIC OBSTRUCTIVE PULMONARY DISEASE (HCC): ICD-10-CM

## 2023-08-18 RX ORDER — LEVALBUTEROL INHALATION SOLUTION 1.25 MG/3ML
1.25 SOLUTION RESPIRATORY (INHALATION) 3 TIMES DAILY PRN
Qty: 270 ML | Refills: 0 | Status: SHIPPED | OUTPATIENT
Start: 2023-08-18 | End: 2023-09-17

## 2023-08-20 RX ORDER — UMECLIDINIUM 62.5 UG/1
1 AEROSOL, POWDER ORAL DAILY
Qty: 30 EACH | Refills: 2 | Status: SHIPPED | OUTPATIENT
Start: 2023-08-20

## 2023-08-28 ENCOUNTER — PROCEDURE VISIT (OUTPATIENT)
Dept: FAMILY MEDICINE CLINIC | Facility: CLINIC | Age: 63
End: 2023-08-28
Payer: COMMERCIAL

## 2023-08-28 VITALS
DIASTOLIC BLOOD PRESSURE: 62 MMHG | TEMPERATURE: 98 F | SYSTOLIC BLOOD PRESSURE: 108 MMHG | RESPIRATION RATE: 16 BRPM | OXYGEN SATURATION: 91 % | WEIGHT: 107.6 LBS | HEIGHT: 58 IN | BODY MASS INDEX: 22.59 KG/M2 | HEART RATE: 73 BPM

## 2023-08-28 DIAGNOSIS — L82.1 SEBORRHEIC KERATOSES: Primary | ICD-10-CM

## 2023-08-28 PROCEDURE — 17110 DESTRUCTION B9 LES UP TO 14: CPT | Performed by: FAMILY MEDICINE

## 2023-08-28 NOTE — PROGRESS NOTES
Lesion Destruction    Date/Time: 8/28/2023 10:40 AM    Performed by: Demetrio Holman MD  Authorized by: Demetrio Holman MD  Universal Protocol:  Procedure performed by:  Consent: Verbal consent obtained. Risks and benefits: risks, benefits and alternatives were discussed  Consent given by: patient  Patient understanding: patient states understanding of the procedure being performed  Patient consent: the patient's understanding of the procedure matches consent given  Procedure consent: procedure consent matches procedure scheduled  Patient identity confirmed: verbally with patient      Procedure Details - Lesion Destruction:     Number of Lesions:  1  Lesion 1:     Body area:  [address] location: right temple. Initial size (mm):  0.8    Malignancy: benign lesion      Destruction method: cryotherapy       Patient tolerated the procedure without complications          1.  Seborrheic keratoses  -     Lesion Destruction

## 2023-08-30 ENCOUNTER — CONSULT (OUTPATIENT)
Dept: CARDIOLOGY CLINIC | Facility: CLINIC | Age: 63
End: 2023-08-30
Payer: COMMERCIAL

## 2023-08-30 ENCOUNTER — TELEPHONE (OUTPATIENT)
Dept: CARDIOLOGY CLINIC | Facility: CLINIC | Age: 63
End: 2023-08-30

## 2023-08-30 VITALS
DIASTOLIC BLOOD PRESSURE: 60 MMHG | OXYGEN SATURATION: 97 % | BODY MASS INDEX: 22.88 KG/M2 | WEIGHT: 109 LBS | SYSTOLIC BLOOD PRESSURE: 108 MMHG | HEART RATE: 62 BPM | HEIGHT: 58 IN

## 2023-08-30 DIAGNOSIS — I47.10 SVT (SUPRAVENTRICULAR TACHYCARDIA): ICD-10-CM

## 2023-08-30 PROCEDURE — 99213 OFFICE O/P EST LOW 20 MIN: CPT | Performed by: INTERNAL MEDICINE

## 2023-08-30 RX ORDER — GLUCOSAMINE/D3/BOSWELLIA SERRA 1500MG-400
TABLET ORAL
COMMUNITY

## 2023-09-05 ENCOUNTER — OFFICE VISIT (OUTPATIENT)
Dept: PULMONOLOGY | Facility: MEDICAL CENTER | Age: 63
End: 2023-09-05
Payer: COMMERCIAL

## 2023-09-05 VITALS
DIASTOLIC BLOOD PRESSURE: 70 MMHG | HEIGHT: 58 IN | HEART RATE: 63 BPM | OXYGEN SATURATION: 94 % | BODY MASS INDEX: 22.67 KG/M2 | RESPIRATION RATE: 12 BRPM | WEIGHT: 108 LBS | TEMPERATURE: 97.2 F | SYSTOLIC BLOOD PRESSURE: 120 MMHG

## 2023-09-05 DIAGNOSIS — J44.9 CHRONIC OBSTRUCTIVE PULMONARY DISEASE (HCC): Primary | ICD-10-CM

## 2023-09-05 DIAGNOSIS — R09.02 OXYGEN DESATURATION: ICD-10-CM

## 2023-09-05 DIAGNOSIS — I47.1 SVT (SUPRAVENTRICULAR TACHYCARDIA) (HCC): Primary | ICD-10-CM

## 2023-09-05 DIAGNOSIS — E03.9 HYPOTHYROID: ICD-10-CM

## 2023-09-05 PROCEDURE — 99215 OFFICE O/P EST HI 40 MIN: CPT | Performed by: STUDENT IN AN ORGANIZED HEALTH CARE EDUCATION/TRAINING PROGRAM

## 2023-09-05 RX ORDER — LEVOTHYROXINE SODIUM 0.1 MG/1
100 TABLET ORAL
Qty: 60 TABLET | Refills: 0 | Status: SHIPPED | OUTPATIENT
Start: 2023-09-05 | End: 2023-09-08 | Stop reason: SDUPTHER

## 2023-09-05 NOTE — PROGRESS NOTES
Consultation - Pulmonary Medicine   Jama Epley 66 y.o. female MRN: [id number]    Reason for Consult: Follow up hospital    Jama Epley is a 66 y.o. female who presents to establish care. Tobacco Use disorder - Currently cutting smoking down but still continues to have trouble with quitting. Plan to use patches and has lozenges for cravings. - Counseling performed    COPD - No PFTs on file but smoking history and symptoms consistent with COPD. CT has emphysematous changing. Plan to evaluate with PFTs. - Full PFT with BD  - Lung Screening - Next CT 7/24        Immunization History   Administered Date(s) Administered   • COVID-19 MODERNA VACC 0.5 ML IM 04/27/2021   • Influenza, injectable, quadrivalent, preservative free 0.5 mL 10/21/2020   • Pneumococcal Conjugate Vaccine 20-valent (Pcv20), Polysace 06/01/2023   • Pneumococcal Polysaccharide PPV23 01/09/2020   • Td (adult), adsorbed 05/28/2018        I have spent a total time of 40 minutes on 09/05/23 in caring for this patient including Diagnostic results, Prognosis, Risks and benefits of tx options, Instructions for management, Patient and family education, Importance of tx compliance, Risk factor reductions, Impressions, Counseling / Coordination of care, Documenting in the medical record and Reviewing / ordering tests, medicine, procedures  . ______________________________________________________________________    HPI:    Jama Epley is a 66 y.o. female who presents follow up  Hospitalize x2 SVT and hypotension s/p Cath mild occlusion completed steroid burst  Tobacco 40-50pack year history - current 1 ppd smoker  Exercise tolerance not limited by breathing - back pain  Eos 400   Allergies Seasonal   No Asthma Hx   No previous exacerbations        Review of Systems:  Review of Systems  Aside from what is mentioned in the HPI, the review of systems otherwise negative.     Current Medications:    Current Outpatient Medications:   •  atorvastatin (LIPITOR) 40 mg tablet, TAKE 1 TABLET (40 MG TOTAL) BY MOUTH DAILY WITH DINNER, Disp: 30 tablet, Rfl: 0  •  Biotin 61631 MCG TABS, Take by mouth, Disp: , Rfl:   •  cetirizine (ZyrTEC) 10 MG chewable tablet, Chew 1 tablet (10 mg total) daily, Disp: 90 tablet, Rfl: 2  •  docusate sodium (COLACE) 100 mg capsule, Take 1 capsule (100 mg total) by mouth daily Pt verbalized she takes this once daily, Disp: , Rfl:   •  ferrous sulfate 324 (65 Fe) mg, Take 1 tablet (324 mg total) by mouth daily before breakfast, Disp: 30 tablet, Rfl: 0  •  fluticasone (FLONASE) 50 mcg/act nasal spray, 1 SPRAY INTO EACH NOSTRIL DAILY, Disp: 16 g, Rfl: 3  •  gabapentin (NEURONTIN) 300 mg capsule, Take 1 capsule (300 mg total) by mouth 3 (three) times a day, Disp: 90 capsule, Rfl: 0  •  hydrOXYzine HCL (ATARAX) 25 mg tablet, Take 25 mg by mouth 3 (three) times a day as needed, Disp: , Rfl:   •  levalbuterol (XOPENEX) 1.25 mg/3 mL nebulizer solution, TAKE 3 ML (1.25 MG TOTAL) BY NEBULIZATION 3 (THREE) TIMES A DAY AS NEEDED FOR WHEEZING OR SHORTNESS OF BREATH, Disp: 270 mL, Rfl: 0  •  levothyroxine 100 mcg tablet, Take 1 tablet (100 mcg total) by mouth daily in the early morning, Disp: 60 tablet, Rfl: 0  •  metoprolol tartrate (LOPRESSOR) 25 mg tablet, Take 0.5 tablets (12.5 mg total) by mouth every 12 (twelve) hours, Disp: 60 tablet, Rfl: 0  •  multivitamin (THERAGRAN) TABS, Take 1 tablet by mouth daily, Disp: , Rfl:   •  QUEtiapine (SEROquel) 25 mg tablet, Take 25 mg by mouth daily at bedtime as needed (for sleep), Disp: , Rfl:   •  umeclidinium (Incruse Ellipta) 62.5 mcg/actuation AEPB inhaler, Inhale 1 puff daily, Disp: 30 each, Rfl: 2  •  Ventolin  (90 Base) MCG/ACT inhaler, INHALE 2 PUFFS EVERY 6 (SIX) HOURS AS NEEDED FOR WHEEZING OR SHORTNESS OF BREATH, Disp: 18 g, Rfl: 0  •  vitamin B-12 (VITAMIN B-12) 1,000 mcg tablet, Take 1 tablet (1,000 mcg total) by mouth daily, Disp: 30 tablet, Rfl: 0  •  polyethylene glycol (MIRALAX) 17 g packet, Take 17 g by mouth daily as needed (constipation) (Patient not taking: Reported on 9/5/2023), Disp: 20 each, Rfl: 0    Historical Information   Past Medical History:   Diagnosis Date   • Anxiety    • Depression    • Disease of thyroid gland    • Elevated troponin 5/31/2023   • History of alcohol abuse 5/31/2023   • Substance abuse [facility])      Past Surgical History:   Procedure Laterality Date   • BACK SURGERY  01/01/1973    scolosis rods   • CARDIAC CATHETERIZATION N/A 7/25/2023    Procedure: Cardiac catheterization;  Surgeon: Daniel Fields MD;  Location: [address] [place] [address] CATH LAB; Service: Cardiology   • ELBOW SURGERY  05/01/2018   • WISDOM TOOTH EXTRACTION       Social History   Social History     Tobacco Use   Smoking Status Some Days   • Packs/day: 1.50   • Years: 30.00   • Total pack years: 45.00   • Types: Cigarettes   Smokeless Tobacco Never   Tobacco Comments    Currently smokes 1 ppd 9/5/2023       Family History:   Family History   Problem Relation Age of Onset   • Colon cancer Mother    • Breast cancer Mother    • Cancer Mother    • Leukemia Father          PhysicalExamination:  Vitals:   /70 (BP Location: Left arm, Patient Position: Sitting, Cuff Size: Standard)   Pulse 63   Temp (!) 97.2 °F (36.2 °C) (Tympanic)   Resp 12   Ht 4' 10" (1.473 m)   Wt 49 kg (108 lb)   SpO2 94%   BMI 22.57 kg/m²     Appearance -- NAD, speaking full sentences  HEENT -- anicteric sclera, clear OP, MMM  Neck -- no JVD  Heart -- RRR, no murmurs  Lungs -- Poor air movement, no wheezing   Abdomen -- soft, NTND, +bs  Extremities -- WWP, no LE edema  Skin -- no rash  Neuro -- A&Ox3, wnl  Psych -- no obvious depression or hallucination        Diagnostic Data:  Labs:   I personally reviewed the most recent laboratory data pertinent to today's visit    Lab Results   Component Value Date    WBC 12.64 (H) 07/27/2023    HGB 11.7 07/27/2023    HCT 36.3 07/27/2023    MCV 92 07/27/2023     07/27/2023     Lab Results Component Value Date    CALCIUM 9.2 07/27/2023    K 3.6 07/27/2023    CO2 30 07/27/2023    CL 99 07/27/2023    BUN 15 07/27/2023    CREATININE 0.50 (L) 07/27/2023     No results found for: "IGE"  Lab Results   Component Value Date    ALT 8 07/27/2023    AST 8 (L) 07/27/2023    ALKPHOS 45 07/27/2023       PFT results: The most recent pulmonary function tests were reviewed. None    Imaging:  I personally reviewed the images on the [facility] system pertinent to today's visit  CT Chest 7/26/23  Atelectasis, scarring in the posterior right lower lobe with some bronchial branch narrowing and occlusion and trace right effusion. Superimposed infection to be excluded clinically Mucous debris in the distal posterior trachea tracking into the bronchi bilaterally.           Susie Griffin MD  [facility]

## 2023-09-05 NOTE — TELEPHONE ENCOUNTER
----- Message from Leigh Ann Louis DO sent at 8/30/2023 12:14 PM EDT -----  please arrange catheter ablation of SVT with [employer] mapping. Hold metoprolol 24 hours prior. Thanks.  Luna Saul
.  I will take care of it
Candi Lopez, can you please see if this patient insurance will allow to her to have procedure done her in [place] before I contact patient to schedule. Thank you.
Larry Boles! This patient's insurance will only provide coverage in [place]. If she has services in [address] [place] [address], they will not be reimbursed/covered. She will need to have services done in [place].
Patient aware insurance will only covered service perform in [place]. She mentioned will call office to see what else she can be done as treatment plan.
Spoke with patient and provide DR Medardo Dockery information at [facility]. Suggest to call the office and get consult schedule. Can someone please place a referral for this patient, in order to be seen out there.
Sure.  I will take care of it
15

## 2023-09-08 ENCOUNTER — OFFICE VISIT (OUTPATIENT)
Dept: FAMILY MEDICINE CLINIC | Facility: CLINIC | Age: 63
End: 2023-09-08
Payer: COMMERCIAL

## 2023-09-08 VITALS
HEART RATE: 63 BPM | DIASTOLIC BLOOD PRESSURE: 60 MMHG | WEIGHT: 109.38 LBS | RESPIRATION RATE: 21 BRPM | BODY MASS INDEX: 22.96 KG/M2 | HEIGHT: 58 IN | TEMPERATURE: 98.1 F | SYSTOLIC BLOOD PRESSURE: 110 MMHG | OXYGEN SATURATION: 92 %

## 2023-09-08 DIAGNOSIS — Z12.11 COLON CANCER SCREENING: ICD-10-CM

## 2023-09-08 DIAGNOSIS — R20.0 NUMBNESS AND TINGLING IN BOTH HANDS: Primary | ICD-10-CM

## 2023-09-08 DIAGNOSIS — R60.0 PEDAL EDEMA: ICD-10-CM

## 2023-09-08 DIAGNOSIS — R20.2 NUMBNESS AND TINGLING IN BOTH HANDS: Primary | ICD-10-CM

## 2023-09-08 DIAGNOSIS — E03.9 HYPOTHYROID: ICD-10-CM

## 2023-09-08 PROCEDURE — 99213 OFFICE O/P EST LOW 20 MIN: CPT | Performed by: FAMILY MEDICINE

## 2023-09-08 RX ORDER — LEVOTHYROXINE SODIUM 0.1 MG/1
100 TABLET ORAL
Qty: 30 TABLET | Refills: 0 | Status: SHIPPED | OUTPATIENT
Start: 2023-09-08

## 2023-09-08 NOTE — PROGRESS NOTES
[facility] Office visit    Assessment/Plan:     1. Numbness and tingling in both hands  Assessment & Plan:  Started 4 months ago and worsened 2 months ago. Pt was on gabapentin 300mg TID  for neuropathic pain due to impinged nerve 2/2 scoliosis. · Normal B12 and folate based on lab done 2 to 3 months ago. No history of diabetes. · Positive Phalen's and Tinel's test  · Recommended to wear splints bilaterally for possible carpal tunnel  · Hypothyroidism could also be contributing to the symptoms. Recommended TSH      2. Hypothyroid  Assessment & Plan: Will check TSH to see if we need to adjust levothyroxine   Currently continuing levothyroxine 100 mcg daily    Orders:  -     levothyroxine 100 mcg tablet; Take 1 tablet (100 mcg total) by mouth daily in the early morning  -     TSH, 3rd generation with Free T4 reflex; Future  -     TSH, 3rd generation with Free T4 reflex    3. Pedal edema  Comments:  Likely dependent edema bilateral feet, could be contributing to the occasional numbness. Recommended feet elevation and compression socks    4. Colon cancer screening  Comments:  Hx of colon cancer in mom passed away at 48  GI appt scehduled within next mth        Return for well women already scheduled. Subjective:   PRETTY Monteiro is a 66 y.o. female with a history of hypothyroidism, scoliosis, and neck pain presents today to address numbness and tingling of the bilateral distal hands and feet. Symptoms started about 4 months ago. She is having problem putting on her earrings because of fingertip numbness. Denies numbness or tingling on the palm or dorsal hands, forearms, or shoulders. Does not appear to follow dermatomal pattern. Denies any pain in hands or feet. Patient used to work with hands on computers and cooking. Patient also had pedal edema of the feet up to the ankle bilaterally. Left ankle swelling is worse than right. History of broken left fibula.     Denies any association with ataxia, speech difficulty, double vision, cranial nerve involvement, impaired bowel and bladder function. Review of Systems   Constitutional: Negative for chills and fever. HENT: Negative for ear pain and sore throat. Eyes: Negative for pain and visual disturbance. Respiratory: Negative for cough and shortness of breath. Cardiovascular: Negative for chest pain and palpitations. Gastrointestinal: Negative for abdominal pain and vomiting. Genitourinary: Negative for dysuria and hematuria. Musculoskeletal: Negative for arthralgias and back pain. Skin: Negative for color change and rash. Neurological: Positive for numbness. Negative for seizures, syncope and facial asymmetry. All other systems reviewed and are negative. Objective:     /60 (BP Location: Left arm, Patient Position: Sitting, Cuff Size: Standard)   Pulse 63   Temp 98.1 °F (36.7 °C) (Temporal)   Resp 21   Ht 4' 10" (1.473 m)   Wt 49.6 kg (109 lb 6 oz)   SpO2 92%   BMI 22.86 kg/m²      Physical Exam  Vitals reviewed. Constitutional:       General: She is not in acute distress. Appearance: Normal appearance. HENT:      Head: Normocephalic and atraumatic. Cardiovascular:      Rate and Rhythm: Normal rate and regular rhythm. Pulses: Normal pulses. Heart sounds: Normal heart sounds. No murmur heard. Pulmonary:      Effort: Pulmonary effort is normal. No respiratory distress. Breath sounds: Normal breath sounds. No wheezing. Musculoskeletal:      Right lower leg: Edema present. Left lower leg: Edema present. Comments: Trace edema bilateral feet up to the ankle. Sensation intact on bilateral hands and feet with monofilament test.  Tingling sensation of left distal digits with Tinel's test.  Positive Phalen's test.   Skin:     General: Skin is warm and dry. Neurological:      General: No focal deficit present.       Mental Status: She is alert and oriented to person, place, and time. Cranial Nerves: No cranial nerve deficit. Sensory: No sensory deficit. Motor: No weakness. Gait: Gait abnormal (Due to scoliosis).    Psychiatric:         Mood and Affect: Mood normal.         Behavior: Behavior normal.           ** Please Note: This note has been constructed using a voice recognition system **     [address] Schofield MD  09/08/23  12:56 PM

## 2023-09-08 NOTE — ASSESSMENT & PLAN NOTE
Started 4 months ago and worsened 2 months ago. Pt was on gabapentin 300mg TID  for neuropathic pain due to impinged nerve 2/2 scoliosis. · Normal B12 and folate based on lab done 2 to 3 months ago. No history of diabetes. · Positive Phalen's and Tinel's test  · Recommended to wear splints bilaterally for possible carpal tunnel  · Hypothyroidism could also be contributing to the symptoms.   Recommended TSH

## 2023-09-08 NOTE — ASSESSMENT & PLAN NOTE
Will check TSH to see if we need to adjust levothyroxine   Currently continuing levothyroxine 100 mcg daily

## 2023-09-18 ENCOUNTER — CONSULT (OUTPATIENT)
Dept: GASTROENTEROLOGY | Facility: CLINIC | Age: 63
End: 2023-09-18
Payer: COMMERCIAL

## 2023-09-18 VITALS
DIASTOLIC BLOOD PRESSURE: 75 MMHG | SYSTOLIC BLOOD PRESSURE: 116 MMHG | BODY MASS INDEX: 22.96 KG/M2 | HEIGHT: 58 IN | WEIGHT: 109.4 LBS | HEART RATE: 59 BPM

## 2023-09-18 DIAGNOSIS — Z86.010 HISTORY OF COLON POLYPS: ICD-10-CM

## 2023-09-18 DIAGNOSIS — D50.9 IRON DEFICIENCY ANEMIA, UNSPECIFIED IRON DEFICIENCY ANEMIA TYPE: ICD-10-CM

## 2023-09-18 DIAGNOSIS — Z80.0 FAMILY HISTORY OF COLON CANCER IN MOTHER: Primary | ICD-10-CM

## 2023-09-18 PROBLEM — Z86.0100 HISTORY OF COLON POLYPS: Status: ACTIVE | Noted: 2023-09-18

## 2023-09-18 PROCEDURE — 99204 OFFICE O/P NEW MOD 45 MIN: CPT | Performed by: PHYSICIAN ASSISTANT

## 2023-09-18 NOTE — PROGRESS NOTES
[facility] - Outpatient Consultation  Cathy Jim 66 y.o. female MRN: [id number]  Encounter: [id number]          ASSESSMENT AND PLAN:      1. Maternal history of colon cancer, personal history of polyps, and iron deficiency anemia of unknown cause . Rule out upper or lower GI sources of iron deficiency anemia, patient also appears to be at elevated risk for colorectal cancer    -Plan for EGD and colonoscopy concurrently    -Procedures were explained in detail to the patient at this time including associated risks and benefits, risks including but not limited to infection, perforation and bleeding    -Instructions were provided for colonoscopy prep    -We will plan for procedures to be performed in hospital; she does have supplemental oxygen dependence and history of SVT for which she has been hospitalized a couple of times this year    -Continue with oral iron supplementation for now, hold for one week prior to endoscopy    ______________________________________________________________________    HPI: 62-year-old female with history of tobacco use, COPD, heavy alcohol use until 5 years ago since which time patient reports she has been sober, who presents for evaluation. She says she was recommended to see us for colon cancer screening after recent hospitalization. She reported that her mother passed away from colon cancer at the age of 48. She reported her last colonoscopy was done over 5 years ago in [facility], for which she doesn't have records available, but she thinks a polyp was removed at that time. She was hospitalized in July after presenting with SVT, with which she had been hospitalized in the past.  Was also noted with low O2 saturations. After conversion via adenosine she was discharged with recommendation for home oxygen supplementation; she says she currently takes 2 L of oxygen overnight and on an as-needed basis during the day.   She also mentioned history of iron deficiency and anemia; her hemoglobin appears to be 11.7 with normocytic indices as of 2 months ago, but she reports she has been on oral iron supplementation since earlier this year; her hemoglobin was in the low eights with microcytic indices at the beginning of the year. REVIEW OF SYSTEMS:    CONSTITUTIONAL: Denies any fever, chills, rigors, and weight loss. HEENT: No earache or tinnitus. Denies hearing loss or visual disturbances. CARDIOVASCULAR: No chest pain or palpitations. RESPIRATORY: Denies any cough, hemoptysis, shortness of breath or dyspnea on exertion. GASTROINTESTINAL: As noted in the History of Present Illness. GENITOURINARY: No problems with urination. Denies any hematuria or dysuria. NEUROLOGIC: No dizziness or vertigo, denies headaches. MUSCULOSKELETAL: Denies any muscle or joint pain. SKIN: Denies skin rashes or itching. ENDOCRINE: Denies excessive thirst. Denies intolerance to heat or cold. PSYCHOSOCIAL: Denies depression or anxiety. Denies any recent memory loss. Historical Information   Past Medical History:   Diagnosis Date   • Anxiety    • Depression    • Disease of thyroid gland    • Elevated troponin 5/31/2023   • History of alcohol abuse 5/31/2023   • Substance abuse [facility])      Past Surgical History:   Procedure Laterality Date   • BACK SURGERY  01/01/1973    scolosis rods   • CARDIAC CATHETERIZATION N/A 07/25/2023    Procedure: Cardiac catheterization;  Surgeon: Janet Mar MD;  Location: [address] [place] [address] CATH LAB;   Service: Cardiology   • COLONOSCOPY     • ELBOW SURGERY  05/01/2018   • WISDOM TOOTH EXTRACTION       Social History   Social History     Substance and Sexual Activity   Alcohol Use Not Currently    Comment: none 7/2018-went to rehab     Social History     Substance and Sexual Activity   Drug Use Not Currently     Social History     Tobacco Use   Smoking Status Some Days   • Packs/day: 1.50   • Years: 30.00   • Total pack years: 45.00   • Types: Cigarettes   Smokeless Tobacco Never   Tobacco Comments    Currently smokes 1 ppd 9/5/2023     Family History   Problem Relation Age of Onset   • Colon cancer Mother    • Breast cancer Mother    • Cancer Mother    • Leukemia Father        Meds/Allergies       Current Outpatient Medications:   •  atorvastatin (LIPITOR) 40 mg tablet  •  Biotin 61185 MCG TABS  •  cetirizine (ZyrTEC) 10 MG chewable tablet  •  docusate sodium (COLACE) 100 mg capsule  •  ferrous sulfate 324 (65 Fe) mg  •  fluticasone (FLONASE) 50 mcg/act nasal spray  •  gabapentin (NEURONTIN) 300 mg capsule  •  hydrOXYzine HCL (ATARAX) 25 mg tablet  •  levothyroxine 100 mcg tablet  •  metoprolol tartrate (LOPRESSOR) 25 mg tablet  •  multivitamin (THERAGRAN) TABS  •  polyethylene glycol (MIRALAX) 17 g packet  •  QUEtiapine (SEROquel) 25 mg tablet  •  umeclidinium (Incruse Ellipta) 62.5 mcg/actuation AEPB inhaler  •  Ventolin  (90 Base) MCG/ACT inhaler  •  vitamin B-12 (VITAMIN B-12) 1,000 mcg tablet    Allergies   Allergen Reactions   • Other Allergic Rhinitis     seasonal           Objective     Blood pressure 116/75, pulse 59, height 4' 10" (1.473 m), weight 49.6 kg (109 lb 6.4 oz), not currently breastfeeding. Body mass index is 22.86 kg/m². PHYSICAL EXAM:      General Appearance:   Alert, cooperative, no distress   HEENT:   Normocephalic, atraumatic, anicteric.     Neck:  Supple, symmetrical, trachea midline   Lungs:   Clear to auscultation bilaterally; no rales, rhonchi or wheezing; respirations unlabored    Heart[de-identified]   Regular rate and rhythm; no murmur, rub, or gallop.    Abdomen:   Soft, non-tender, non-distended; normal bowel sounds; no masses, no organomegaly    Genitalia:   Deferred    Rectal:   Deferred    Extremities:  No cyanosis, clubbing or edema    Pulses:  2+ and symmetric    Skin:  No jaundice, rashes, or lesions    Lymph nodes:  No palpable cervical lymphadenopathy        Lab Results:   No visits with results within 1 Day(s) from this visit. Latest known visit with results is:   No results displayed because visit has over 200 results. Radiology Results:   No results found.

## 2023-09-18 NOTE — PATIENT INSTRUCTIONS
Scheduled date of EGD/colonoscopy (as of today):11/27/23  Physician performing EGD/colonoscopy:Garrett  Location of EGD/colonoscopy:[facility]  Desired bowel prep reviewed with patient:Miralax/dolculax  Instructions reviewed with patient by:Judith MCINTYRE  Clearances:   None

## 2023-09-21 ENCOUNTER — HOSPITAL ENCOUNTER (OUTPATIENT)
Dept: PULMONOLOGY | Facility: HOSPITAL | Age: 63
End: 2023-09-21
Attending: STUDENT IN AN ORGANIZED HEALTH CARE EDUCATION/TRAINING PROGRAM
Payer: COMMERCIAL

## 2023-09-21 DIAGNOSIS — J44.9 CHRONIC OBSTRUCTIVE PULMONARY DISEASE (HCC): ICD-10-CM

## 2023-09-21 PROCEDURE — 94729 DIFFUSING CAPACITY: CPT

## 2023-09-21 PROCEDURE — 94729 DIFFUSING CAPACITY: CPT | Performed by: STUDENT IN AN ORGANIZED HEALTH CARE EDUCATION/TRAINING PROGRAM

## 2023-09-21 PROCEDURE — 94726 PLETHYSMOGRAPHY LUNG VOLUMES: CPT

## 2023-09-21 PROCEDURE — 94726 PLETHYSMOGRAPHY LUNG VOLUMES: CPT | Performed by: STUDENT IN AN ORGANIZED HEALTH CARE EDUCATION/TRAINING PROGRAM

## 2023-09-21 PROCEDURE — 94060 EVALUATION OF WHEEZING: CPT | Performed by: STUDENT IN AN ORGANIZED HEALTH CARE EDUCATION/TRAINING PROGRAM

## 2023-09-21 PROCEDURE — 94760 N-INVAS EAR/PLS OXIMETRY 1: CPT

## 2023-09-21 PROCEDURE — 94060 EVALUATION OF WHEEZING: CPT

## 2023-09-21 RX ORDER — ALBUTEROL SULFATE 2.5 MG/3ML
2.5 SOLUTION RESPIRATORY (INHALATION) ONCE
Status: COMPLETED | OUTPATIENT
Start: 2023-09-21 | End: 2023-09-21

## 2023-09-21 RX ADMIN — ALBUTEROL SULFATE 2.5 MG: 2.5 SOLUTION RESPIRATORY (INHALATION) at 16:33

## 2023-09-26 ENCOUNTER — TELEPHONE (OUTPATIENT)
Age: 63
End: 2023-09-26

## 2023-09-26 NOTE — TELEPHONE ENCOUNTER
Pt rescheduled colon/EGD to 12/1/23 with Dr Guerita Robles.  Her appt was scheduled at [facility]

## 2023-10-03 NOTE — PROGRESS NOTES
EPS Consultation/New Patient Evaluation - Edith Spring 66 y.o. female MRN: [id number]           ASSESSMENT:  1. SVT (supraventricular tachycardia) ([address])  Ambulatory referral to Cardiology    POCT ECG              PLAN:  D/W patient options of SVT ablation versus continued medications. Risks benefits and alternatives of both options discussed. Estimated up to 95% excess rate with 1 procedure for catheter ablation of SVT and also discussed potential risks of the procedure including damage to blood vessels heart valves need for permanent pacemaker stroke heart attack and heart perforation all of which are approximately 1% or less however I did explain there is inherent risk in performing procedures in the heart. Patient understands and would like to move forward with a catheter ablation as she is highly symptomatic with her SVT. Arrangements will be made for catheter ablation of her SVT    CC/HPI:   Consult for highly symptomatic SVT. Breaks with adenosine. Episode in May and July. ROS   When she has her episodes she becomes short of breath lightheaded dizzy and has palpitations she feels very poorly and SVT when she is not in SVT she feels well all other 12 point ROS negative for complaints today    Objective:     Vitals: Blood pressure 108/60, pulse 62, height 4' 10" (1.473 m), weight 49.4 kg (109 lb), SpO2 97 %, not currently breastfeeding., Body mass index is 22.78 kg/m². ,        Physical Exam:    GEN: Edith Spring appears well, alert and oriented x 3, pleasant and cooperative   HEENT: pupils equal, round, and reactive to light; extraocular muscles intact  NECK: supple, no carotid bruits   HEART: regular rhythm, normal S1 and S2, no murmurs, clicks, gallops or rubs   LUNGS: clear to auscultation bilaterally; no wheezes, rales, or rhonchi   ABDOMEN: normal bowel sounds, soft, no tenderness, no distention  EXTREMITIES: peripheral pulses normal; no clubbing, cyanosis, or edema  NEURO: no focal findings   SKIN: normal without suspicious lesions on exposed skin    Medications:      Current Outpatient Medications:   •  atorvastatin (LIPITOR) 40 mg tablet, TAKE 1 TABLET (40 MG TOTAL) BY MOUTH DAILY WITH DINNER, Disp: 30 tablet, Rfl: 0  •  Biotin 73042 MCG TABS, Take by mouth, Disp: , Rfl:   •  cetirizine (ZyrTEC) 10 MG chewable tablet, Chew 1 tablet (10 mg total) daily, Disp: 90 tablet, Rfl: 2  •  docusate sodium (COLACE) 100 mg capsule, Take 1 capsule (100 mg total) by mouth daily Pt verbalized she takes this once daily, Disp: , Rfl:   •  ferrous sulfate 324 (65 Fe) mg, Take 1 tablet (324 mg total) by mouth daily before breakfast, Disp: 30 tablet, Rfl: 0  •  fluticasone (FLONASE) 50 mcg/act nasal spray, 1 SPRAY INTO EACH NOSTRIL DAILY, Disp: 16 g, Rfl: 3  •  gabapentin (NEURONTIN) 300 mg capsule, Take 1 capsule (300 mg total) by mouth 3 (three) times a day, Disp: 90 capsule, Rfl: 0  •  hydrOXYzine HCL (ATARAX) 25 mg tablet, Take 25 mg by mouth 3 (three) times a day as needed, Disp: , Rfl:   •  metoprolol tartrate (LOPRESSOR) 25 mg tablet, Take 0.5 tablets (12.5 mg total) by mouth every 12 (twelve) hours, Disp: 60 tablet, Rfl: 0  •  multivitamin (THERAGRAN) TABS, Take 1 tablet by mouth daily, Disp: , Rfl:   •  polyethylene glycol (MIRALAX) 17 g packet, Take 17 g by mouth daily as needed (constipation) (Patient taking differently: Take 17 g by mouth daily as needed (constipation) Taking prn), Disp: 20 each, Rfl: 0  •  QUEtiapine (SEROquel) 25 mg tablet, Take 25 mg by mouth daily at bedtime as needed (for sleep), Disp: , Rfl:   •  umeclidinium (Incruse Ellipta) 62.5 mcg/actuation AEPB inhaler, Inhale 1 puff daily, Disp: 30 each, Rfl: 2  •  Ventolin  (90 Base) MCG/ACT inhaler, INHALE 2 PUFFS EVERY 6 (SIX) HOURS AS NEEDED FOR WHEEZING OR SHORTNESS OF BREATH, Disp: 18 g, Rfl: 0  •  vitamin B-12 (VITAMIN B-12) 1,000 mcg tablet, Take 1 tablet (1,000 mcg total) by mouth daily, Disp: 30 tablet, Rfl: 0  • levothyroxine 100 mcg tablet, Take 1 tablet (100 mcg total) by mouth daily in the early morning, Disp: 30 tablet, Rfl: 0     Family History   Problem Relation Age of Onset   • Colon cancer Mother    • Breast cancer Mother    • Cancer Mother    • Leukemia Father      Social History     Socioeconomic History   • Marital status:      Spouse name: Not on file   • Number of children: Not on file   • Years of education: Not on file   • Highest education level: Not on file   Occupational History   • Not on file   Tobacco Use   • Smoking status: Some Days     Packs/day: 1.50     Years: 30.00     Total pack years: 45.00     Types: Cigarettes   • Smokeless tobacco: Never   • Tobacco comments:     Currently smokes 1 ppd 9/5/2023   Substance and Sexual Activity   • Alcohol use: Not Currently     Comment: none 7/2018-went to rehab   • Drug use: Not Currently   • Sexual activity: Not on file   Other Topics Concern   • Not on file   Social History Narrative   • Not on file     Social Determinants of Health     Financial Resource Strain: Low Risk  (8/14/2023)    Overall Financial Resource Strain (CARDIA)    • Difficulty of Paying Living Expenses: Not very hard   Food Insecurity: No Food Insecurity (7/24/2023)    Hunger Vital Sign    • Worried About Running Out of Food in the Last Year: Never true    • Ran Out of Food in the Last Year: Never true   Transportation Needs: No Transportation Needs (8/14/2023)    PRAPARE - Transportation    • Lack of Transportation (Medical): No    • Lack of Transportation (Non-Medical):  No   Physical Activity: Not on file   Stress: Not on file   Social Connections: Not on file   Intimate Partner Violence: Not on file   Housing Stability: Low Risk  (7/24/2023)    Housing Stability Vital Sign    • Unable to Pay for Housing in the Last Year: No    • Number of Places Lived in the Last Year: 1    • Unstable Housing in the Last Year: No     Social History     Tobacco Use   Smoking Status Some Days   • Packs/day: 1.50   • Years: 30.00   • Total pack years: 45.00   • Types: Cigarettes   Smokeless Tobacco Never   Tobacco Comments    Currently smokes 1 ppd 9/5/2023     Social History     Substance and Sexual Activity   Alcohol Use Not Currently    Comment: none 7/2018-went to rehab       Labs & Results:  Below is the patient's most recent value for Albumin, ALT, AST, BUN, Calcium, Chloride, Cholesterol, CO2, Creatinine, GFR, Glucose, HDL, Hematocrit, Hemoglobin, Hemoglobin A1C, LDL, Magnesium, Phosphorus, Platelets, Potassium, PSA, Sodium, Triglycerides, and WBC. Lab Results   Component Value Date    ALT 8 07/27/2023    AST 8 (L) 07/27/2023    BUN 15 07/27/2023    CALCIUM 9.2 07/27/2023    CL 99 07/27/2023    CO2 30 07/27/2023    CREATININE 0.50 (L) 07/27/2023    HDL 71 06/01/2023    HCT 36.3 07/27/2023    HGB 11.7 07/27/2023    HGBA1C 5.3 05/31/2023    MG 2.0 07/27/2023    PHOS 3.6 07/23/2023     07/27/2023    K 3.6 07/27/2023    TRIG 88 06/01/2023    WBC 12.64 (H) 07/27/2023     Note: for a comprehensive list of the patient's lab results, access the Results Review activity. Cardiac testing:   No results found for this or any previous visit. No results found for this or any previous visit. No results found for this or any previous visit. No results found for this or any previous visit.

## 2023-10-12 ENCOUNTER — TELEPHONE (OUTPATIENT)
Age: 63
End: 2023-10-12

## 2023-10-12 NOTE — TELEPHONE ENCOUNTER
Scheduled date of EGD/colonoscopy (as of today):12/13/2023  Physician performing EGD/colonoscopy:Dr. Gely Horton  Location of EGD/colonoscopy: [address] [place] [address]      Patient called In to reschedule due to having another procedure on 12/1/2023

## 2023-10-13 ENCOUNTER — TELEPHONE (OUTPATIENT)
Dept: PULMONOLOGY | Facility: CLINIC | Age: 63
End: 2023-10-13

## 2023-10-13 DIAGNOSIS — E78.5 HLD (HYPERLIPIDEMIA): ICD-10-CM

## 2023-10-13 DIAGNOSIS — J44.9 CHRONIC OBSTRUCTIVE PULMONARY DISEASE, UNSPECIFIED COPD TYPE (HCC): Primary | ICD-10-CM

## 2023-10-13 RX ORDER — LEVALBUTEROL 1.25 MG/.5ML
1.25 SOLUTION, CONCENTRATE RESPIRATORY (INHALATION) EVERY 8 HOURS PRN
Qty: 180 ML | Refills: 5 | Status: SHIPPED | OUTPATIENT
Start: 2023-10-13

## 2023-10-13 RX ORDER — ATORVASTATIN CALCIUM 40 MG/1
40 TABLET, FILM COATED ORAL
Qty: 30 TABLET | Refills: 0 | Status: SHIPPED | OUTPATIENT
Start: 2023-10-13

## 2023-10-13 NOTE — TELEPHONE ENCOUNTER
Patient called refill line requesting refill on levalbuterol solution to be sent in to [facility]. I see we did not order previously please advise.

## 2023-10-13 NOTE — TELEPHONE ENCOUNTER
on refill line:     Hi, my name is Ann Aguilar. My YOB: 1960 My phone number is [phone]. I need a refill on atorvastatin calcium 43 milligrams, quantity 30 to go to [facility], [phone]. Thank you.  Bye, bye.

## 2023-10-16 ENCOUNTER — ANNUAL EXAM (OUTPATIENT)
Dept: FAMILY MEDICINE CLINIC | Facility: CLINIC | Age: 63
End: 2023-10-16
Payer: COMMERCIAL

## 2023-10-16 VITALS
HEART RATE: 78 BPM | WEIGHT: 110 LBS | OXYGEN SATURATION: 86 % | HEIGHT: 58 IN | BODY MASS INDEX: 23.09 KG/M2 | DIASTOLIC BLOOD PRESSURE: 68 MMHG | SYSTOLIC BLOOD PRESSURE: 110 MMHG | RESPIRATION RATE: 20 BRPM

## 2023-10-16 DIAGNOSIS — Z01.419 ENCOUNTER FOR ANNUAL ROUTINE GYNECOLOGICAL EXAMINATION: Primary | ICD-10-CM

## 2023-10-16 DIAGNOSIS — Z59.82 TRANSPORTATION UNAVAILABLE: ICD-10-CM

## 2023-10-16 DIAGNOSIS — Z12.4 CERVICAL CANCER SCREENING: ICD-10-CM

## 2023-10-16 DIAGNOSIS — Z23 ENCOUNTER FOR IMMUNIZATION: ICD-10-CM

## 2023-10-16 PROCEDURE — 99396 PREV VISIT EST AGE 40-64: CPT

## 2023-10-16 PROCEDURE — 87624 HPV HI-RISK TYP POOLED RSLT: CPT

## 2023-10-16 PROCEDURE — 90686 IIV4 VACC NO PRSV 0.5 ML IM: CPT

## 2023-10-16 PROCEDURE — 99396 PREV VISIT EST AGE 40-64: CPT | Performed by: FAMILY MEDICINE

## 2023-10-16 PROCEDURE — G0008 ADMIN INFLUENZA VIRUS VAC: HCPCS

## 2023-10-16 PROCEDURE — 88175 CYTOPATH C/V AUTO FLUID REDO: CPT | Performed by: PATHOLOGY

## 2023-10-16 SDOH — ECONOMIC STABILITY - TRANSPORTATION SECURITY: TRANSPORTATION INSECURITY: Z59.82

## 2023-10-16 NOTE — PROGRESS NOTES
Annual GYN Visit    Assessment       1. Encounter for annual routine gynecological examination  -     Liquid-based pap, diagnostic    2. Encounter for immunization  -     influenza vaccine, quadrivalent, 0.5 mL, preservative-free, for adult and pediatric patients 6 mos+ (AFLURIA, FLUARIX, FLULAVAL, FLUZONE)    3. Cervical cancer screening    4. Transportation unavailable  Comments:  Patient is to follow up with [facility] cardiology (electrophysiologist). But she will need assistance with transportation  Orders:  -     Ambulatory Referral to Social Work Care Management Program; Future         Plan     I have discussed the importance of monthly self-breast exams, exercise and healthy diet as well as adequate intake of calcium and vitamin D. The patient declines STD testing. The current ASCCP guidelines were reviewed. The low risk patient will receive pap smear screening every 3 years or pap with HPV co-testing every 5 years. Per the current guidelines, pap smears may be discontinued after age 72. I emphasized the importance of an annual pelvic and breast exam.  In addition, a yearly mammogram is recommended for breast cancer screening as well as colon cancer screening with a colonoscopy every 10 years or FOBT every years. I reviewed the patient’s risk factors for osteoporosis and ordered a DEXA scan if applicable. All questions have been answered to her satisfaction. Eugene Monteiro is a 66 y.o. female who presents for annual well woman exam.     GYN:  No vaginal discharge, labial erythema or lesions, dyspareunia. Menopause at 46. No spotting or vaginal bleed since menopause  Patient is not sexually active. Last pap smear: unsure when    OB:   female  Pregnancies were not complicated. :  No dysuria, urinary frequency or urgency. No hematuria, flank pain, incontinence. Breast:  No breast mass, skin changes, dimpling, reddening, nipple retraction. No breast discharge.   Last mammogram was in August 2022. Results were normal.      General:  Tobacco use: current smoker    Screening:  Cervical cancer: last pap smear not sure. Never had abnormal pap. Breast cancer: last mammogram in 2022. Results were normal.  Colon cancer: last colonoscopy in 10 yrs ago. Results were normal. Next colonospy scheduled in December  STD screening: declined. Review of Systems  Pertinent items are noted in HPI. Objective      /68 (BP Location: Left arm, Patient Position: Sitting, Cuff Size: Standard)   Pulse 78   Resp 20   Ht 4' 10" (1.473 m)   Wt 49.9 kg (110 lb)   SpO2 (!) 86%   BMI 22.99 kg/m²     Physical Exam  Vitals and nursing note reviewed. Exam conducted with a chaperone present. Constitutional:       General: She is not in acute distress. Appearance: Normal appearance. Neck:      Thyroid: No thyromegaly or thyroid tenderness. Trachea: Trachea normal.   Cardiovascular:      Rate and Rhythm: Normal rate and regular rhythm. Pulses: Normal pulses. Heart sounds: Normal heart sounds. Pulmonary:      Effort: Pulmonary effort is normal. No respiratory distress. Breath sounds: Normal breath sounds. Chest:   Breasts:     Breasts are symmetrical.      Right: Normal. No swelling, bleeding, inverted nipple, mass, nipple discharge, skin change or tenderness. Left: Normal. No swelling, bleeding, inverted nipple, mass, nipple discharge, skin change or tenderness. Abdominal:      General: Bowel sounds are normal.      Palpations: Abdomen is soft. There is no mass. Tenderness: There is no abdominal tenderness. Genitourinary:     Labia:         Right: No rash, tenderness, lesion or injury. Left: No rash, tenderness, lesion or injury. Vagina: Normal.      Cervix: Normal.      Uterus: Normal.       Adnexa: Right adnexa normal and left adnexa normal.   Musculoskeletal:      Cervical back: No tenderness.    Neurological:      Mental Status: She is alert.

## 2023-10-17 ENCOUNTER — PATIENT OUTREACH (OUTPATIENT)
Dept: FAMILY MEDICINE CLINIC | Facility: CLINIC | Age: 63
End: 2023-10-17

## 2023-10-17 LAB
HPV HR 12 DNA CVX QL NAA+PROBE: NEGATIVE
HPV16 DNA CVX QL NAA+PROBE: POSITIVE
HPV18 DNA CVX QL NAA+PROBE: NEGATIVE

## 2023-10-17 NOTE — PROGRESS NOTES
[facility] had received a referral from Kodak Brooks MD r/t transportation unavailable. [facility] had completed a chart review. Per chart, patient needs to f/u with cardiologist in [place], [address] Per chart, patient has [address] [facility] notes patient can use [employer] transportation through this coverage. [facility] had called the patient via phone. [facility] left a voicemail. [facility] will attempt to call again at a later date and time. [facility] will continue to be available.

## 2023-10-20 LAB
LAB AP GYN PRIMARY INTERPRETATION: ABNORMAL
Lab: ABNORMAL
PATH INTERP SPEC-IMP: ABNORMAL

## 2023-10-20 PROCEDURE — 88141 CYTOPATH C/V INTERPRET: CPT | Performed by: PATHOLOGY

## 2023-10-24 ENCOUNTER — PATIENT OUTREACH (OUTPATIENT)
Dept: FAMILY MEDICINE CLINIC | Facility: CLINIC | Age: 63
End: 2023-10-24

## 2023-10-24 NOTE — PROGRESS NOTES
[facility] had called the patient via phone. [facility] left a voicemail. [facility] notes this is the second phone call attempt. [facility] sent unable to reach letter via mail. [facility] closed referral. Please reconsult SW for future needs.

## 2023-10-24 NOTE — LETTER
[address] [id number]    Re: Kecia Fareed to reach   10/24/2023       Dear Tiffany Friedman,    I tried to reach you by phone and was unfortunately unable to reach you. I am the Outpatient Care Manager -  from [facility]. I am following up from your last office visit.  Please give me a call at [phone] from 8am-4:30pm.    Sincerely,         EUSEBIA Gutierrez   Outpatient Care Manager -

## 2023-10-30 ENCOUNTER — PATIENT OUTREACH (OUTPATIENT)
Dept: FAMILY MEDICINE CLINIC | Facility: CLINIC | Age: 63
End: 2023-10-30

## 2023-10-30 NOTE — PROGRESS NOTES
[facility] had called the patient via phone. [facility] notes patient called her back last Thursday. SWCM introduced herself and reason for consult. SWCM asked patient how she was doing. Patient reported she is doing well. Patient reported she uses [employer] transportation through her insurance plan. Patient reported that [employer] won't go out of the county. SW had suggested volunteer transportation through [facility]. SWCM explained that she would need to call and set up services. SWCM continued to explain that once she does paperwork for them she will be able to set up rides, must be 5 days in advance. Patient understood. SWCM had suggested [employer] as another transportation option. SWCM explained that they scheduled rides [facility]. SWCM continued to explain that they are a membership plan and the lowest is $10.39 a month. Patient stated she has [employer] and [employer] kavon on her phone so does not need to use [employer]. Patient stated she will just use [employer] as that seems to be the best option for her. Patient reported she lives alone. Patient reported she has family and friends for support. Patient reported she has her SS disability. Patient stated she works under 20 hours to maintain her SS disability. Patient did not report any food insecurity or housing issues at this time. Patient denied any issues with cost of medication. Patient denied any caregiver assistance at this time. Patient denied any other needs at this time. [facility] provided her contact information. SW advised patient reach out as needed. Patient understood. Patient denied any continued  outreach at this time. [facility] continue to keep referral closed. Please reconsult.

## 2023-11-03 DIAGNOSIS — E03.9 HYPOTHYROID: ICD-10-CM

## 2023-11-03 DIAGNOSIS — L82.0 SEBORRHEIC KERATOSES, INFLAMED: ICD-10-CM

## 2023-11-03 RX ORDER — LEVOTHYROXINE SODIUM 0.1 MG/1
TABLET ORAL
Qty: 30 TABLET | Refills: 0 | Status: SHIPPED | OUTPATIENT
Start: 2023-11-03

## 2023-11-03 NOTE — TELEPHONE ENCOUNTER
Called patient and let her know that her prescriptions were sent to [id number] [facility]

## 2023-11-03 NOTE — TELEPHONE ENCOUNTER
Royce left on RX line:    Hi, my name is Justyn Kennedy. My number is [phone]. I need refills for levothyroxine 100 milligrams and metoprolol 25 milligrams. I do not know the qualities. Please call me if there's a problem. Thank you. urszula Fall. The prescription for levothyroxine was already sent to the pharmacy this morning so just need a refill for the metoprolol 25 mg tablet.

## 2023-11-03 NOTE — TELEPHONE ENCOUNTER
Vm on refill line:     Hi, my name is Lanette Way. My number is [phone]. I need refills for levothyroxine 100 milligrams and metoprolol 25 milligrams. I do not know the qualities. Please call me if there's a problem. Thank you. Bye, bye.      Med already sent to pharm.

## 2023-11-08 ENCOUNTER — TELEPHONE (OUTPATIENT)
Dept: PULMONOLOGY | Facility: CLINIC | Age: 63
End: 2023-11-08

## 2023-11-08 DIAGNOSIS — E78.5 HLD (HYPERLIPIDEMIA): ICD-10-CM

## 2023-11-08 RX ORDER — ATORVASTATIN CALCIUM 40 MG/1
TABLET, FILM COATED ORAL
Qty: 30 TABLET | Refills: 0 | Status: SHIPPED | OUTPATIENT
Start: 2023-11-08

## 2023-11-25 DIAGNOSIS — E78.5 HLD (HYPERLIPIDEMIA): ICD-10-CM

## 2023-11-27 ENCOUNTER — HOSPITAL ENCOUNTER (OUTPATIENT)
Dept: RADIOLOGY | Facility: HOSPITAL | Age: 63
Discharge: HOME | End: 2023-11-27
Payer: COMMERCIAL

## 2023-11-27 DIAGNOSIS — Z12.31 ENCOUNTER FOR SCREENING MAMMOGRAM FOR BREAST CANCER: ICD-10-CM

## 2023-11-27 PROCEDURE — 77063 BREAST TOMOSYNTHESIS BI: CPT

## 2023-11-27 PROCEDURE — 77067 SCR MAMMO BI INCL CAD: CPT

## 2023-11-27 RX ORDER — ATORVASTATIN CALCIUM 40 MG/1
TABLET, FILM COATED ORAL
Qty: 30 TABLET | Refills: 0 | Status: SHIPPED | OUTPATIENT
Start: 2023-11-27

## 2023-11-29 ENCOUNTER — ANESTHESIA EVENT (OUTPATIENT)
Dept: ANESTHESIOLOGY | Facility: HOSPITAL | Age: 63
End: 2023-11-29

## 2023-11-29 ENCOUNTER — ANESTHESIA (OUTPATIENT)
Dept: ANESTHESIOLOGY | Facility: HOSPITAL | Age: 63
End: 2023-11-29

## 2023-11-29 ENCOUNTER — TELEPHONE (OUTPATIENT)
Dept: FAMILY MEDICINE CLINIC | Facility: CLINIC | Age: 63
End: 2023-11-29

## 2023-11-29 NOTE — TELEPHONE ENCOUNTER
Pt was called to notify that her mammogram was normal. Will repeat routine mammogram next year. Pt acknowledged her understanding and answered all her questions.

## 2023-12-01 ENCOUNTER — OFFICE VISIT (OUTPATIENT)
Dept: FAMILY MEDICINE CLINIC | Facility: CLINIC | Age: 63
End: 2023-12-01
Payer: COMMERCIAL

## 2023-12-01 ENCOUNTER — TELEPHONE (OUTPATIENT)
Dept: FAMILY MEDICINE CLINIC | Facility: CLINIC | Age: 63
End: 2023-12-01

## 2023-12-01 VITALS
HEART RATE: 80 BPM | TEMPERATURE: 98 F | DIASTOLIC BLOOD PRESSURE: 70 MMHG | BODY MASS INDEX: 23.22 KG/M2 | OXYGEN SATURATION: 90 % | HEIGHT: 59 IN | RESPIRATION RATE: 21 BRPM | WEIGHT: 115.19 LBS | SYSTOLIC BLOOD PRESSURE: 120 MMHG

## 2023-12-01 DIAGNOSIS — R20.0 NUMBNESS AND TINGLING IN BOTH HANDS: ICD-10-CM

## 2023-12-01 DIAGNOSIS — R20.2 NUMBNESS AND TINGLING IN BOTH HANDS: ICD-10-CM

## 2023-12-01 DIAGNOSIS — Z71.6 ENCOUNTER FOR TOBACCO USE CESSATION COUNSELING: ICD-10-CM

## 2023-12-01 DIAGNOSIS — G89.29 CHRONIC BILATERAL LOW BACK PAIN, UNSPECIFIED WHETHER SCIATICA PRESENT: ICD-10-CM

## 2023-12-01 DIAGNOSIS — M54.50 CHRONIC BILATERAL LOW BACK PAIN, UNSPECIFIED WHETHER SCIATICA PRESENT: ICD-10-CM

## 2023-12-01 DIAGNOSIS — J44.9 CHRONIC OBSTRUCTIVE PULMONARY DISEASE, UNSPECIFIED COPD TYPE (HCC): Primary | ICD-10-CM

## 2023-12-01 PROBLEM — R53.83 FATIGUE: Status: ACTIVE | Noted: 2023-12-01

## 2023-12-01 PROCEDURE — 99213 OFFICE O/P EST LOW 20 MIN: CPT | Performed by: FAMILY MEDICINE

## 2023-12-01 RX ORDER — FLUTICASONE PROPIONATE AND SALMETEROL 50; 250 UG/1; UG/1
1 POWDER RESPIRATORY (INHALATION) 2 TIMES DAILY
Qty: 60 BLISTER | Refills: 1 | Status: SHIPPED | OUTPATIENT
Start: 2023-12-01

## 2023-12-01 RX ORDER — BUPROPION HYDROCHLORIDE 150 MG/1
150 TABLET, EXTENDED RELEASE ORAL 2 TIMES DAILY
Qty: 60 TABLET | Refills: 2 | Status: SHIPPED | OUTPATIENT
Start: 2023-12-01

## 2023-12-01 RX ORDER — UMECLIDINIUM 62.5 UG/1
1 AEROSOL, POWDER ORAL DAILY
Qty: 30 EACH | Refills: 2 | Status: SHIPPED | OUTPATIENT
Start: 2023-12-01

## 2023-12-01 NOTE — PROGRESS NOTES
Name: Audrey Post      : 1960      MRN: [id number]  Encounter Provider: Nina Danielson MD  Encounter Date: 2023   Encounter department: [address]     1. Chronic obstructive pulmonary disease, unspecified COPD type ([address])  Assessment & Plan:  Increased dyspnea when going up the stairs. Home O2 saturation usually in the low 90s. Recent PFT showed very severe COPD and FEV1 35% of predicted value. Currently using 2 L NC at bedtime for oxygen desaturation at night. Gold criteria A. Currently using Incruse elliptica 1 puff daily and Xopenex 1-2 times daily. Continue Incruse Ellipta 1 puff daily  Added Advair discus 1 puff twice daily, rinse mouth after inhalation  Continue Xopenex only as as needed  Follow-up with pulmonology    Orders:  -     umeclidinium (Incruse Ellipta) 62.5 mcg/actuation AEPB inhaler; Inhale 1 puff daily  -     Fluticasone-Salmeterol (Advair Diskus) 250-50 mcg/dose inhaler; Inhale 1 puff 2 (two) times a day Rinse mouth after use. 2. Numbness and tingling in both hands  Assessment & Plan:  Symptoms started around May 2023. Occurs intermittently, but increased in frequency. Normal B12 and folate levels. No history of diabetes. Continue gabapentin 300 mg 3 times daily  Will get cervical spine x-ray to rule out cervical radiculopathy  Previously ordered TSH    Orders:  -     XR spine cervical complete 4 or 5 vw non injury; Future; Expected date: 2023    3. Chronic bilateral low back pain, unspecified whether sciatica present  Assessment & Plan:  History of scoliosis with spinal procedure done in her childhood. Reports having bilateral lower back pain, which hinders her from hiking and group physical activities. Ordered x-ray lumbar spine  Continue physical activities as tolerated    Orders:  -     XR spine lumbar minimum 4 views non injury; Future; Expected date: 2023    4.  Encounter for tobacco use cessation counseling  - buPROPion (Wellbutrin SR) 150 mg 12 hr tablet; Take 1 tablet (150 mg total) by mouth 2 (two) times a day      Tobacco Cessation Counseling: Tobacco cessation counseling was provided. The patient is sincerely urged to quit consumption of tobacco. She is ready to quit tobacco. Medication options and side effects of medication discussed. Patient agreed to medication. Bupropion SR was prescribed. Patient states that she will call an electrophysiologist at [facility] to make an appointment for cardiac ablation. Patient appeared worried about the upcoming appointment for colposcopy. I explained the procedure and answered questions. Subjective      COPD:  Current symptoms: increased shortness of breath with exertion going up the stairs  Provoking/Palliating Factors: with exertion. Symptoms occurs more at home, likely triggered by mold or environmental factors at home. Patient also smokes more at home than while she is at work. Frequency: persistent. Maintenance therapy: Incruse elliptica  Treatment compliance: compliant all of the time  Comfortable with inhaler technique:  yes  Able to perform ADL's:  yes  Smoking: Yes, education provided and smokes half a pack a day since age of 14. Currently has nicotine lozenges. Home O2: Yes 2 L nasal cannula at bedtime    GOLD Criteria      Flowsheet Row Office Visit from 12/1/2023 in [address] [place] [address]   Symptom severity Dyspnea when hurrying or walking up a slight hill   Exacerbation History  0 exacerbations   FEV1 % of predicted  35   GOLD Grade 3   GOLD Stage A          Last PFT date: 9/22/2023      Social history:  Patient works 4 days a week at [employer] in [facility] [facility]. 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 Patient works in [employer] and enjoys her job very much. 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   Patient has never used IV drug.  But she has been on multiple substances via different routes other than IV. Review of Systems   Constitutional:  Negative for chills and fever. HENT:  Negative for ear pain and sore throat. Eyes:  Negative for pain and visual disturbance. Respiratory:  Positive for shortness of breath. Negative for cough. Cardiovascular:  Negative for chest pain and palpitations. Gastrointestinal:  Negative for abdominal pain and vomiting. Genitourinary:  Negative for dysuria and hematuria. Musculoskeletal:  Positive for back pain and neck pain. Negative for arthralgias. Skin:  Negative for color change and rash. Neurological:  Positive for numbness. Negative for seizures, syncope, light-headedness and headaches. All other systems reviewed and are negative.       Current Outpatient Medications on File Prior to Visit   Medication Sig    atorvastatin (LIPITOR) 40 mg tablet TAKE ONE TABLET BY MOUTH DAILY WITH DINNER AS DIRECTED    Biotin 92187 MCG TABS Take by mouth    cetirizine (ZyrTEC) 10 MG chewable tablet Chew 1 tablet (10 mg total) daily    ferrous sulfate 324 (65 Fe) mg Take 1 tablet (324 mg total) by mouth daily before breakfast    fluticasone (FLONASE) 50 mcg/act nasal spray 1 SPRAY INTO EACH NOSTRIL DAILY    gabapentin (NEURONTIN) 300 mg capsule Take 1 capsule (300 mg total) by mouth 3 (three) times a day    hydrOXYzine HCL (ATARAX) 25 mg tablet Take 25 mg by mouth 3 (three) times a day as needed    levalbuterol (XOPENEX) 1.25 mg/0.5 mL nebulizer solution Take 0.5 mL (1.25 mg total) by nebulization every 8 (eight) hours as needed for wheezing    levothyroxine 100 mcg tablet TAKE 1 TABLET DAILY IN THE EARLY MORNING    metoprolol tartrate (LOPRESSOR) 25 mg tablet Take 0.5 tablets (12.5 mg total) by mouth every 12 (twelve) hours    multivitamin (THERAGRAN) TABS Take 1 tablet by mouth daily    QUEtiapine (SEROquel) 25 mg tablet Take 25 mg by mouth daily at bedtime as needed (for sleep)    Ventolin  (90 Base) MCG/ACT inhaler INHALE 2 PUFFS EVERY 6 (SIX) HOURS AS NEEDED FOR WHEEZING OR SHORTNESS OF BREATH    vitamin B-12 (VITAMIN B-12) 1,000 mcg tablet Take 1 tablet (1,000 mcg total) by mouth daily    [DISCONTINUED] umeclidinium (Incruse Ellipta) 62.5 mcg/actuation AEPB inhaler Inhale 1 puff daily    docusate sodium (COLACE) 100 mg capsule Take 1 capsule (100 mg total) by mouth daily Pt verbalized she takes this once daily (Patient not taking: Reported on 12/1/2023)    polyethylene glycol (MIRALAX) 17 g packet Take 17 g by mouth daily as needed (constipation) (Patient not taking: Reported on 12/1/2023)       Objective     /70 (BP Location: Left arm, Patient Position: Sitting, Cuff Size: Child)   Pulse 80   Temp 98 °F (36.7 °C) (Temporal)   Resp 21   Ht 4' 11" (1.499 m)   Wt 52.2 kg (115 lb 3 oz)   SpO2 90%   BMI 23.27 kg/m²     Physical Exam  Vitals reviewed. Constitutional:       General: She is not in acute distress. Appearance: Normal appearance. HENT:      Head: Normocephalic and atraumatic. Cardiovascular:      Rate and Rhythm: Normal rate and regular rhythm. Pulses: Normal pulses. Heart sounds: Normal heart sounds. No murmur heard. Pulmonary:      Effort: Pulmonary effort is normal. No respiratory distress. Breath sounds: Normal breath sounds. No wheezing. Musculoskeletal:      Right lower leg: No edema. Left lower leg: No edema. Comments: Lower back pain bilaterally. Spinal curvature   Skin:     General: Skin is warm and dry. Neurological:      General: No focal deficit present. Mental Status: She is alert and oriented to person, place, and time. Motor: No weakness.    Psychiatric:         Mood and Affect: Mood normal.         Behavior: Behavior normal.       Jay Asif MD

## 2023-12-01 NOTE — ASSESSMENT & PLAN NOTE
Symptoms started around May 2023. Occurs intermittently, but increased in frequency. Normal B12 and folate levels. No history of diabetes.   Continue gabapentin 300 mg 3 times daily  Will get cervical spine x-ray to rule out cervical radiculopathy  Previously ordered TSH

## 2023-12-01 NOTE — PROGRESS NOTES
[facility] Office visit    Assessment/Plan:     {There are no diagnoses linked to this encounter. (Refresh or delete this SmartLink)}      Severe COPD, follow-up with pulmonology, Dr. Wilmer Magdaleno     No follow-ups on file. Subjective:   PRETTY Briseno is a 66 y.o. female ***     Review of Systems     Objective: There were no vitals taken for this visit.      Physical Exam     ** Please Note: This note has been constructed using a voice recognition system **     [address] Schofield MD  12/01/23  8:24 AM

## 2023-12-01 NOTE — TELEPHONE ENCOUNTER
VM on appt line:    Hi, my name's Judith Nair. My phone number is [phone]. I have an appointment on Tuesday December 5th. I don't know what time. If you would please call me back that would be helpful. Thank you. Bye, bye. Spoke with pt - advised her the time of her appt.

## 2023-12-01 NOTE — ASSESSMENT & PLAN NOTE
Increased dyspnea when going up the stairs. Home O2 saturation usually in the low 90s. Recent PFT showed very severe COPD and FEV1 35% of predicted value. Currently using 2 L NC at bedtime for oxygen desaturation at night. Gold criteria A. Currently using Incruse elliptica 1 puff daily and Xopenex 1-2 times daily.   Continue Incruse Ellipta 1 puff daily  Added Advair discus 1 puff twice daily, rinse mouth after inhalation  Continue Xopenex only as as needed  Follow-up with pulmonology

## 2023-12-01 NOTE — ASSESSMENT & PLAN NOTE
History of scoliosis with spinal procedure done in her childhood. Reports having bilateral lower back pain, which hinders her from hiking and group physical activities.   Ordered x-ray lumbar spine  Continue physical activities as tolerated

## 2023-12-05 ENCOUNTER — PROCEDURE VISIT (OUTPATIENT)
Dept: FAMILY MEDICINE CLINIC | Facility: CLINIC | Age: 63
End: 2023-12-05
Payer: COMMERCIAL

## 2023-12-05 VITALS
BODY MASS INDEX: 22.82 KG/M2 | DIASTOLIC BLOOD PRESSURE: 57 MMHG | OXYGEN SATURATION: 90 % | SYSTOLIC BLOOD PRESSURE: 117 MMHG | HEART RATE: 75 BPM | WEIGHT: 113 LBS | RESPIRATION RATE: 16 BRPM

## 2023-12-05 DIAGNOSIS — R87.810 ASCUS WITH POSITIVE HIGH RISK HPV CERVICAL: Primary | ICD-10-CM

## 2023-12-05 DIAGNOSIS — Z72.0 TOBACCO ABUSE: ICD-10-CM

## 2023-12-05 DIAGNOSIS — N95.2 ATROPHIC VAGINITIS: ICD-10-CM

## 2023-12-05 DIAGNOSIS — R87.610 ASCUS WITH POSITIVE HIGH RISK HPV CERVICAL: Primary | ICD-10-CM

## 2023-12-05 DIAGNOSIS — N88.2 CERVICAL STENOSIS (UTERINE CERVIX): ICD-10-CM

## 2023-12-05 PROCEDURE — 57454 BX/CURETT OF CERVIX W/SCOPE: CPT | Performed by: OBSTETRICS & GYNECOLOGY

## 2023-12-05 RX ORDER — GABAPENTIN 400 MG/1
400 CAPSULE ORAL 3 TIMES DAILY
COMMUNITY
Start: 2023-12-04

## 2023-12-05 NOTE — PROGRESS NOTES
Assessment/Plan:         Diagnoses and all orders for this visit:    ASCUS with positive high risk HPV 16 cervical  -     Colposcopy  -     Pathology Report    Atrophic vaginitis    Cervical stenosis (uterine cervix)    Tobacco abuse  Comments:  45 pk year smoking hx, 0.5/pk a day. - Educated pt on the increased risk of not clearnig out the HPV due to smoking  -Counseled pt on smoking cessation    Other orders  -     gabapentin (NEURONTIN) 400 mg capsule; Take 400 mg by mouth 3 (three) times a day              Subjective:        Patient ID: Bryon Flanagan is a 66 y.o. female     HPI    presents today for a colposcopy. Pt had an abnormal pap smear done 2 months ago. Her pap smear was positive for ASCUS and HPV 16. Pt has a 45 pk year smoking history and now smokes 0.5 pk year. She has not been sexually active since the last 4 years. Had a sexual encounter 4 years ago. Pt thought she was in a previous monogamous relationship but later found out it was not monogamous. Pt was a little anxious and took ibuprofen  and Seroquel before she came for the colposcopy. The following portions of the patient's history were reviewed and updated as appropriate: She  has a past medical history of Anxiety, Depression, Disease of thyroid gland, Elevated troponin (5/31/2023), History of alcohol abuse (5/31/2023), and Substance abuse ([facility]).   Patient Active Problem List    Diagnosis Date Noted    ASCUS with positive high risk HPV cervical 12/05/2023    Atrophic vaginitis 12/05/2023    Cervical stenosis (uterine cervix) 12/05/2023    Fatigue 12/01/2023    Family history of colon cancer in mother 09/18/2023    History of colon polyps 09/18/2023    Numbness and tingling in both hands 08/14/2023    Hypotension 07/23/2023    Seborrheic keratoses, inflamed 07/03/2023    Hypothyroid 06/01/2023    SVT (supraventricular tachycardia) 05/31/2023    Depression with anxiety 05/31/2023    Nicotine abuse 05/31/2023    Anemia 05/31/2023    Chronic low back pain 05/31/2023    Chronic obstructive pulmonary disease ([address]) 05/17/2019     She  has a past surgical history that includes Elbow surgery (05/01/2018); Back surgery (01/01/1973); [place] tooth extraction; Cardiac catheterization (N/A, 07/25/2023); and Colonoscopy. Her family history includes Breast cancer in her mother; Cancer in her mother; Colon cancer in her mother; Leukemia in her father. She  reports that she has been smoking cigarettes. She has a 45.00 pack-year smoking history. She has never used smokeless tobacco. She reports that she does not currently use alcohol. She reports that she does not currently use drugs. Current Outpatient Medications   Medication Sig Dispense Refill    atorvastatin (LIPITOR) 40 mg tablet TAKE ONE TABLET BY MOUTH DAILY WITH DINNER AS DIRECTED 30 tablet 0    Biotin 48648 MCG TABS Take by mouth      buPROPion (Wellbutrin SR) 150 mg 12 hr tablet Take 1 tablet (150 mg total) by mouth 2 (two) times a day 60 tablet 2    cetirizine (ZyrTEC) 10 MG chewable tablet Chew 1 tablet (10 mg total) daily 90 tablet 2    ferrous sulfate 324 (65 Fe) mg Take 1 tablet (324 mg total) by mouth daily before breakfast 30 tablet 0    fluticasone (FLONASE) 50 mcg/act nasal spray 1 SPRAY INTO EACH NOSTRIL DAILY 16 g 3    Fluticasone-Salmeterol (Advair Diskus) 250-50 mcg/dose inhaler Inhale 1 puff 2 (two) times a day Rinse mouth after use.  60 blister 1    gabapentin (NEURONTIN) 400 mg capsule Take 400 mg by mouth 3 (three) times a day      hydrOXYzine HCL (ATARAX) 25 mg tablet Take 25 mg by mouth 3 (three) times a day as needed      levalbuterol (XOPENEX) 1.25 mg/0.5 mL nebulizer solution Take 0.5 mL (1.25 mg total) by nebulization every 8 (eight) hours as needed for wheezing 180 mL 5    levothyroxine 100 mcg tablet TAKE 1 TABLET DAILY IN THE EARLY MORNING 30 tablet 0    metoprolol tartrate (LOPRESSOR) 25 mg tablet Take 0.5 tablets (12.5 mg total) by mouth every 12 (twelve) hours 60 tablet 0 multivitamin (THERAGRAN) TABS Take 1 tablet by mouth daily      QUEtiapine (SEROquel) 25 mg tablet Take 25 mg by mouth daily at bedtime as needed (for sleep)      umeclidinium (Incruse Ellipta) 62.5 mcg/actuation AEPB inhaler Inhale 1 puff daily 30 each 2    Ventolin  (90 Base) MCG/ACT inhaler INHALE 2 PUFFS EVERY 6 (SIX) HOURS AS NEEDED FOR WHEEZING OR SHORTNESS OF BREATH 18 g 0    vitamin B-12 (VITAMIN B-12) 1,000 mcg tablet Take 1 tablet (1,000 mcg total) by mouth daily 30 tablet 0    docusate sodium (COLACE) 100 mg capsule Take 1 capsule (100 mg total) by mouth daily Pt verbalized she takes this once daily (Patient not taking: Reported on 12/1/2023)      polyethylene glycol (MIRALAX) 17 g packet Take 17 g by mouth daily as needed (constipation) (Patient not taking: Reported on 12/1/2023) 20 each 0     No current facility-administered medications for this visit.      Current Outpatient Medications on File Prior to Visit   Medication Sig    atorvastatin (LIPITOR) 40 mg tablet TAKE ONE TABLET BY MOUTH DAILY WITH DINNER AS DIRECTED    Biotin 60985 MCG TABS Take by mouth    buPROPion (Wellbutrin SR) 150 mg 12 hr tablet Take 1 tablet (150 mg total) by mouth 2 (two) times a day    cetirizine (ZyrTEC) 10 MG chewable tablet Chew 1 tablet (10 mg total) daily    ferrous sulfate 324 (65 Fe) mg Take 1 tablet (324 mg total) by mouth daily before breakfast    fluticasone (FLONASE) 50 mcg/act nasal spray 1 SPRAY INTO EACH NOSTRIL DAILY    Fluticasone-Salmeterol (Advair Diskus) 250-50 mcg/dose inhaler Inhale 1 puff 2 (two) times a day Rinse mouth after use.    gabapentin (NEURONTIN) 400 mg capsule Take 400 mg by mouth 3 (three) times a day    hydrOXYzine HCL (ATARAX) 25 mg tablet Take 25 mg by mouth 3 (three) times a day as needed    levalbuterol (XOPENEX) 1.25 mg/0.5 mL nebulizer solution Take 0.5 mL (1.25 mg total) by nebulization every 8 (eight) hours as needed for wheezing    levothyroxine 100 mcg tablet TAKE 1 TABLET DAILY IN THE EARLY MORNING    metoprolol tartrate (LOPRESSOR) 25 mg tablet Take 0.5 tablets (12.5 mg total) by mouth every 12 (twelve) hours    multivitamin (THERAGRAN) TABS Take 1 tablet by mouth daily    QUEtiapine (SEROquel) 25 mg tablet Take 25 mg by mouth daily at bedtime as needed (for sleep)    umeclidinium (Incruse Ellipta) 62.5 mcg/actuation AEPB inhaler Inhale 1 puff daily    Ventolin  (90 Base) MCG/ACT inhaler INHALE 2 PUFFS EVERY 6 (SIX) HOURS AS NEEDED FOR WHEEZING OR SHORTNESS OF BREATH    vitamin B-12 (VITAMIN B-12) 1,000 mcg tablet Take 1 tablet (1,000 mcg total) by mouth daily    docusate sodium (COLACE) 100 mg capsule Take 1 capsule (100 mg total) by mouth daily Pt verbalized she takes this once daily (Patient not taking: Reported on 12/1/2023)    polyethylene glycol (MIRALAX) 17 g packet Take 17 g by mouth daily as needed (constipation) (Patient not taking: Reported on 12/1/2023)    [DISCONTINUED] gabapentin (NEURONTIN) 300 mg capsule Take 1 capsule (300 mg total) by mouth 3 (three) times a day     No current facility-administered medications on file prior to visit. She is allergic to other. .    Review of Systems   Constitutional:  Negative for activity change, diaphoresis and fever. HENT:  Negative for congestion. Respiratory:  Negative for cough and wheezing. Gastrointestinal:  Negative for constipation, diarrhea and vomiting. Genitourinary:  Negative for dyspareunia, frequency, hematuria and vaginal discharge. Musculoskeletal:  Negative for back pain. Neurological:  Negative for weakness and headaches. Hematological:  Negative for adenopathy. Psychiatric/Behavioral:  Positive for agitation. Negative for behavioral problems and dysphoric mood. Objective:    Vitals:    12/05/23 1054   BP: 117/57   Pulse: 75   Resp: 16   SpO2: 90%   Weight: 51.3 kg (113 lb)            Physical Exam  Constitutional:       Appearance: Normal appearance. She is normal weight. HENT:      Head: Normocephalic and atraumatic. Right Ear: Tympanic membrane normal.      Left Ear: Tympanic membrane normal.      Nose: Nose normal.      Mouth/Throat:      Mouth: Mucous membranes are moist.   Eyes:      Extraocular Movements: Extraocular movements intact. Pupils: Pupils are equal, round, and reactive to light. Cardiovascular:      Rate and Rhythm: Normal rate and regular rhythm. Pulmonary:      Effort: Pulmonary effort is normal.      Breath sounds: Wheezing present. Abdominal:      General: Abdomen is flat. Palpations: Abdomen is soft. Musculoskeletal:         General: Normal range of motion. Skin:     General: Skin is warm. Capillary Refill: Capillary refill takes less than 2 seconds. Neurological:      General: No focal deficit present. Mental Status: She is alert and oriented to person, place, and time. Psychiatric:         Mood and Affect: Mood normal.         Behavior: Behavior normal.         Thought Content: Thought content normal.         Judgment: Judgment normal.            Colposcopy     Date/Time  12/5/2023 11:05 AM     Universal Protocol   Procedure performed by: ()  Consent: Verbal consent obtained. Risks and benefits: risks, benefits and alternatives were discussed  Consent given by: patient  Time out: Immediately prior to procedure a "time out" was called to verify the correct patient, procedure, equipment, support staff and site/side marked as required.   Patient understanding: patient states understanding of the procedure being performed  Patient consent: the patient's understanding of the procedure matches consent given  Procedure consent: procedure consent matches procedure scheduled  Patient identity confirmed: verbally with patient     Performed by  Becca Paris MD   Authorized by  Becca Paris MD     Pre-procedure details      Pre-procedure timeout performed: yes      Premeds:  Acetaminophen    Prepped with: acetic acid Indication    ASC-US (HR HPV 16)   Procedure Details   Procedure: Colposcopy w/ cervical biopsy and ECC      Under satisfactory analgesia the patient was prepped and draped in the dorsal lithotomy position: yes      Sells speculum was placed in the vagina: yes      Under colposcopic examination the transition zone was seen in entirety: no      Intracervical block was performed: no      Endocervix was curetted using a Kevorkian curette: yes      Cervical biopsy performed with a cervical biopsy punch: yes      Tampon inserted: no      Monsel's solution was applied: yes      Biopsy(s): yes      Location:  5, 10    Specimen to pathology: yes     Comments       Atrophic vagina and cervix. Cervix was stenotic. Cervix was short and almost flush with the vagina. Intially difficult to find the cervix with the speculum until bimanual exam was done first. Cervix was deviated posteriorly. Before and after the application of acetic acid there were no aceto white changes. Atrophic changes were noted. Two representative samples were taken at 5 and 10 O'clock. The location was predicated on the ability to take a  biopsy at that location because the cervix was so short. ECC was also difficult due to stenosis, with moderate pressure the Kevorkian curette was able to negotiate the External Os. Monsel solution was used for hemostasis. Pt tolerated the procedure well and was given appropriate instructions for post-op procedure care.

## 2023-12-08 ENCOUNTER — TELEPHONE (OUTPATIENT)
Dept: PULMONOLOGY | Facility: MEDICAL CENTER | Age: 63
End: 2023-12-08

## 2023-12-08 NOTE — TELEPHONE ENCOUNTER
Patient LM she needed to reschedule pulmonary appointment. Called patient back LM for patient to return call.
Never smoker

## 2023-12-11 LAB
PATH REPORT.COMMENTS IMP SPEC: NORMAL
PATH REPORT.SITE OF ORIGIN SPEC: NORMAL
PAYMENT PROCEDURE: NORMAL
SL AMB .: NORMAL

## 2023-12-12 ENCOUNTER — TELEPHONE (OUTPATIENT)
Dept: FAMILY MEDICINE CLINIC | Facility: CLINIC | Age: 63
End: 2023-12-12

## 2023-12-12 RX ORDER — SODIUM CHLORIDE, SODIUM LACTATE, POTASSIUM CHLORIDE, CALCIUM CHLORIDE 600; 310; 30; 20 MG/100ML; MG/100ML; MG/100ML; MG/100ML
75 INJECTION, SOLUTION INTRAVENOUS CONTINUOUS
Status: CANCELLED | OUTPATIENT
Start: 2023-12-12

## 2023-12-13 ENCOUNTER — ANESTHESIA EVENT (OUTPATIENT)
Dept: PERIOP | Facility: HOSPITAL | Age: 63
End: 2023-12-13

## 2023-12-13 ENCOUNTER — ANESTHESIA (OUTPATIENT)
Dept: PERIOP | Facility: HOSPITAL | Age: 63
End: 2023-12-13

## 2023-12-13 ENCOUNTER — HOSPITAL ENCOUNTER (OUTPATIENT)
Dept: PERIOP | Facility: HOSPITAL | Age: 63
Setting detail: OUTPATIENT SURGERY
Discharge: HOME | End: 2023-12-13
Attending: INTERNAL MEDICINE | Admitting: INTERNAL MEDICINE
Payer: COMMERCIAL

## 2023-12-13 VITALS
DIASTOLIC BLOOD PRESSURE: 56 MMHG | TEMPERATURE: 98.7 F | OXYGEN SATURATION: 99 % | SYSTOLIC BLOOD PRESSURE: 121 MMHG | RESPIRATION RATE: 18 BRPM | HEART RATE: 86 BPM

## 2023-12-13 DIAGNOSIS — Z86.010 HISTORY OF COLON POLYPS: ICD-10-CM

## 2023-12-13 DIAGNOSIS — Z80.0 FAMILY HISTORY OF COLON CANCER IN MOTHER: ICD-10-CM

## 2023-12-13 DIAGNOSIS — K29.70 GASTRITIS WITHOUT BLEEDING, UNSPECIFIED CHRONICITY, UNSPECIFIED GASTRITIS TYPE: Primary | ICD-10-CM

## 2023-12-13 DIAGNOSIS — D50.9 IRON DEFICIENCY ANEMIA, UNSPECIFIED IRON DEFICIENCY ANEMIA TYPE: ICD-10-CM

## 2023-12-13 PROBLEM — IMO0001 SMOKING: Status: ACTIVE | Noted: 2023-12-13

## 2023-12-13 PROBLEM — E78.5 HYPERLIPIDEMIA: Status: ACTIVE | Noted: 2023-12-13

## 2023-12-13 PROBLEM — I10 HTN (HYPERTENSION): Status: ACTIVE | Noted: 2023-12-13

## 2023-12-13 PROBLEM — Z99.81 OXYGEN DEPENDENT: Status: ACTIVE | Noted: 2023-12-13

## 2023-12-13 PROBLEM — F17.200 SMOKING: Status: ACTIVE | Noted: 2023-12-13

## 2023-12-13 PROCEDURE — 88313 SPECIAL STAINS GROUP 2: CPT | Performed by: PATHOLOGY

## 2023-12-13 PROCEDURE — 88341 IMHCHEM/IMCYTCHM EA ADD ANTB: CPT | Performed by: PATHOLOGY

## 2023-12-13 PROCEDURE — G0105 COLORECTAL SCRN; HI RISK IND: HCPCS | Performed by: INTERNAL MEDICINE

## 2023-12-13 PROCEDURE — 43239 EGD BIOPSY SINGLE/MULTIPLE: CPT | Performed by: INTERNAL MEDICINE

## 2023-12-13 PROCEDURE — 88360 TUMOR IMMUNOHISTOCHEM/MANUAL: CPT | Performed by: PATHOLOGY

## 2023-12-13 PROCEDURE — 45378 DIAGNOSTIC COLONOSCOPY: CPT | Performed by: INTERNAL MEDICINE

## 2023-12-13 PROCEDURE — 88305 TISSUE EXAM BY PATHOLOGIST: CPT | Performed by: PATHOLOGY

## 2023-12-13 PROCEDURE — 88342 IMHCHEM/IMCYTCHM 1ST ANTB: CPT | Performed by: PATHOLOGY

## 2023-12-13 RX ORDER — PROPOFOL 10 MG/ML
INJECTION, EMULSION INTRAVENOUS AS NEEDED
Status: DISCONTINUED | OUTPATIENT
Start: 2023-12-13 | End: 2023-12-13

## 2023-12-13 RX ORDER — PANTOPRAZOLE SODIUM 40 MG/1
40 TABLET, DELAYED RELEASE ORAL DAILY
Qty: 90 TABLET | Refills: 0 | Status: SHIPPED | OUTPATIENT
Start: 2023-12-13 | End: 2024-03-12

## 2023-12-13 RX ORDER — SODIUM CHLORIDE, SODIUM LACTATE, POTASSIUM CHLORIDE, CALCIUM CHLORIDE 600; 310; 30; 20 MG/100ML; MG/100ML; MG/100ML; MG/100ML
75 INJECTION, SOLUTION INTRAVENOUS CONTINUOUS
Status: DISCONTINUED | OUTPATIENT
Start: 2023-12-13 | End: 2023-12-17 | Stop reason: HOSPADM

## 2023-12-13 RX ORDER — LIDOCAINE HYDROCHLORIDE 20 MG/ML
INJECTION, SOLUTION EPIDURAL; INFILTRATION; INTRACAUDAL; PERINEURAL AS NEEDED
Status: DISCONTINUED | OUTPATIENT
Start: 2023-12-13 | End: 2023-12-13

## 2023-12-13 RX ADMIN — SODIUM CHLORIDE, SODIUM LACTATE, POTASSIUM CHLORIDE, AND CALCIUM CHLORIDE 75 ML/HR: .6; .31; .03; .02 INJECTION, SOLUTION INTRAVENOUS at 12:03

## 2023-12-13 RX ADMIN — PROPOFOL 50 MG: 10 INJECTION, EMULSION INTRAVENOUS at 13:29

## 2023-12-13 RX ADMIN — PROPOFOL 100 MG: 10 INJECTION, EMULSION INTRAVENOUS at 13:07

## 2023-12-13 RX ADMIN — PROPOFOL 50 MG: 10 INJECTION, EMULSION INTRAVENOUS at 13:19

## 2023-12-13 RX ADMIN — PROPOFOL 30 MG: 10 INJECTION, EMULSION INTRAVENOUS at 13:24

## 2023-12-13 RX ADMIN — PROPOFOL 30 MG: 10 INJECTION, EMULSION INTRAVENOUS at 13:14

## 2023-12-13 RX ADMIN — PROPOFOL 30 MG: 10 INJECTION, EMULSION INTRAVENOUS at 13:10

## 2023-12-13 RX ADMIN — LIDOCAINE HYDROCHLORIDE 100 MG: 20 INJECTION, SOLUTION EPIDURAL; INFILTRATION; INTRACAUDAL; PERINEURAL at 13:07

## 2023-12-13 NOTE — H&P
History and Physical -  [facility]  Iona Toribio 63 y.o. female MRN: [id number]    HPI: Iona Toribio is a 63 y.o. year old female who presents for high risk  colonoscopy and evaluation of anemia.      Review of Systems    Historical Information   Past Medical History:   Diagnosis Date    Anxiety     Depression     Disease of thyroid gland     Elevated troponin 5/31/2023    History of alcohol abuse 5/31/2023    Substance abuse (HCC)      Past Surgical History:   Procedure Laterality Date    BACK SURGERY  01/01/1973    scolosis rods    CARDIAC CATHETERIZATION N/A 07/25/2023    Procedure: Cardiac catheterization;  Surgeon: Lizet Ibarra MD;  Location: [facility];  Service: Cardiology    COLONOSCOPY      ELBOW SURGERY  05/01/2018    WISDOM TOOTH EXTRACTION       Social History   Social History     Substance and Sexual Activity   Alcohol Use Not Currently    Comment: none 7/2018-went to rehab     Social History     Substance and Sexual Activity   Drug Use Not Currently     Social History     Tobacco Use   Smoking Status Some Days    Current packs/day: 1.50    Average packs/day: 1.5 packs/day for 30.0 years (45.0 ttl pk-yrs)    Types: Cigarettes   Smokeless Tobacco Never   Tobacco Comments    Currently smokes 1 ppd 9/5/2023     Family History   Problem Relation Age of Onset    Colon cancer Mother     Breast cancer Mother     Cancer Mother     Leukemia Father        Meds/Allergies     (Not in a hospital admission)      Allergies   Allergen Reactions    Other Allergic Rhinitis     seasonal       Objective     /55   Pulse 86   Temp 98.7 °F (37.1 °C) (Tympanic)   Resp 16   SpO2 93%       PHYSICAL EXAM    Gen: NAD  CV: RRR  CHEST: Clear  ABD: soft, NT/ND  EXT: no edema  Neuro: AAO      ASSESSMENT/PLAN:  This is a 63 y.o. year old female here for high risk screening colonoscopy and evaluation of anemia.    PLAN:   Procedure: EGD and colonoscopy.

## 2023-12-13 NOTE — ANESTHESIA POSTPROCEDURE EVALUATION
Post-Op Assessment Note    CV Status:  Stable    Pain management: adequate       Mental Status:  Sleepy and arousable   Hydration Status:  Stable   PONV Controlled:  None   Airway Patency:  Patent and adequate     Post Op Vitals Reviewed: Yes      Staff: CRNA               BP      Temp      Pulse     Resp      SpO2

## 2023-12-13 NOTE — ANESTHESIA PREPROCEDURE EVALUATION
Procedure:  EGD  COLONOSCOPY    Relevant Problems   CARDIO   (+) HTN (hypertension)   (+) Hyperlipidemia   (+) SVT (supraventricular tachycardia)      ENDO   (+) Hypothyroid      HEMATOLOGY   (+) Anemia      MUSCULOSKELETAL  Lumbar spine rods, hardware left elbow   (+) Chronic low back pain      NEURO/PSYCH   (+) Chronic low back pain   (+) Depression with anxiety   (+) Numbness and tingling in both hands      PULMONARY   (+) Chronic obstructive pulmonary disease (HCC)   (+) Oxygen dependent - 2 lpm (as needed)   (+) Smoking        Physical Exam    Airway    Mallampati score: II  TM Distance: >3 FB  Neck ROM: full     Dental        Cardiovascular  Rhythm: regular, Rate: normal    Pulmonary   Breath sounds clear to auscultation    Other Findings  post-pubertal.      Anesthesia Plan  ASA Score- 3     Anesthesia Type- IV sedation with anesthesia with ASA Monitors. Additional Monitors:     Airway Plan:            Plan Factors-    Chart reviewed. Patient is a current smoker. Patient instructed to abstain from smoking on day of procedure. Patient smoked on day of surgery. Induction- intravenous. Postoperative Plan-     Informed Consent- Anesthetic plan and risks discussed with patient. I personally reviewed this patient with the CRNA. Discussed and agreed on the Anesthesia Plan with the CRNA. Zita Verduzco

## 2023-12-22 PROCEDURE — 88342 IMHCHEM/IMCYTCHM 1ST ANTB: CPT | Performed by: PATHOLOGY

## 2023-12-22 PROCEDURE — 88360 TUMOR IMMUNOHISTOCHEM/MANUAL: CPT | Performed by: PATHOLOGY

## 2023-12-22 PROCEDURE — 88341 IMHCHEM/IMCYTCHM EA ADD ANTB: CPT | Performed by: PATHOLOGY

## 2023-12-22 PROCEDURE — 88305 TISSUE EXAM BY PATHOLOGIST: CPT | Performed by: PATHOLOGY

## 2023-12-22 PROCEDURE — 88313 SPECIAL STAINS GROUP 2: CPT | Performed by: PATHOLOGY

## 2023-12-26 DIAGNOSIS — J44.9 CHRONIC OBSTRUCTIVE PULMONARY DISEASE, UNSPECIFIED COPD TYPE (HCC): ICD-10-CM

## 2023-12-26 RX ORDER — FLUTICASONE PROPIONATE AND SALMETEROL 50; 250 UG/1; UG/1
1 POWDER RESPIRATORY (INHALATION) 2 TIMES DAILY
Qty: 60 BLISTER | Refills: 1 | Status: SHIPPED | OUTPATIENT
Start: 2023-12-26

## 2023-12-27 ENCOUNTER — OFFICE VISIT (OUTPATIENT)
Dept: PULMONOLOGY | Facility: MEDICAL CENTER | Age: 63
End: 2023-12-27
Payer: COMMERCIAL

## 2023-12-27 VITALS
TEMPERATURE: 97.8 F | OXYGEN SATURATION: 97 % | HEIGHT: 58 IN | HEART RATE: 67 BPM | BODY MASS INDEX: 24.14 KG/M2 | WEIGHT: 115 LBS | DIASTOLIC BLOOD PRESSURE: 74 MMHG | RESPIRATION RATE: 12 BRPM | SYSTOLIC BLOOD PRESSURE: 120 MMHG

## 2023-12-27 DIAGNOSIS — J43.1 PANLOBULAR EMPHYSEMA (HCC): Primary | ICD-10-CM

## 2023-12-27 DIAGNOSIS — J96.11 CHRONIC RESPIRATORY FAILURE WITH HYPOXIA (HCC): ICD-10-CM

## 2023-12-27 DIAGNOSIS — R06.02 SHORTNESS OF BREATH: ICD-10-CM

## 2023-12-27 DIAGNOSIS — Z87.891 PERSONAL HISTORY OF NICOTINE DEPENDENCE: ICD-10-CM

## 2023-12-27 DIAGNOSIS — Z72.0 TOBACCO ABUSE: ICD-10-CM

## 2023-12-27 PROCEDURE — 99407 BEHAV CHNG SMOKING > 10 MIN: CPT | Performed by: STUDENT IN AN ORGANIZED HEALTH CARE EDUCATION/TRAINING PROGRAM

## 2023-12-27 PROCEDURE — 99215 OFFICE O/P EST HI 40 MIN: CPT | Performed by: STUDENT IN AN ORGANIZED HEALTH CARE EDUCATION/TRAINING PROGRAM

## 2023-12-27 PROCEDURE — 94618 PULMONARY STRESS TESTING: CPT | Performed by: STUDENT IN AN ORGANIZED HEALTH CARE EDUCATION/TRAINING PROGRAM

## 2023-12-27 RX ORDER — NICOTINE 21 MG/24HR
1 PATCH, TRANSDERMAL 24 HOURS TRANSDERMAL EVERY 24 HOURS
Qty: 28 PATCH | Refills: 0 | Status: SHIPPED | OUTPATIENT
Start: 2023-12-27

## 2023-12-27 NOTE — PROGRESS NOTES
Consultation - Pulmonary Medicine   Iona Toribio 63 y.o. female MRN: [id number]    Reason for Consult: COPD    Iona Toribio is a 63 y.o. female with a PMH of COPD, HTN, SVT, Scoliosis who presents  for follow up.         COPD - Severe - Minimal exacerbations. Eos 480 She has significant dyspnea on exertion but overall stable. She would benefit from pulmonary rehab.  - inhaler teaching done  - Continue Incruse, Advair  - Levalbuterol PRN and prior to exercise  - Pulm Rehab    Tobacco Use disorder - Currently cutting smoking down but still continues to have trouble with quitting. Plan to use patches and has lozenges for cravings.   - Lung Screening - Next CT 7/24  - Nicotine Patches    Chronic Hypoxic Respiratory - Desaturated 87% with ambulation - 2L Oxygen place   - Continue Oxygen replacement with ambulation 2L    Villa Beltran MD  [facility]  ______________________________________________________________________  Interval Hx: 12/27  Symptoms unchanged  Dyspnea with carrying groceries  Quit smoking from 9-5  - 1/2 PPD   Recent Colonoscopy/EGD - Stomach nodule    HPI:    Iona Toribio is a 63 y.o. female who presents follow up  Hospitalize x2 SVT and hypotension s/p Cath mild occlusion completed steroid burst  Tobacco 40-50pack year history - current 1 ppd smoker  Exercise tolerance not limited by breathing - back pain  Eos 400   Allergies Seasonal   No Asthma Hx   No previous exacerbations        PFT results:  The most recent pulmonary function tests were reviewed.  9/2023  FEV1/FVC Ratio: 60 %  Forced Vital Capacity: 1.21 L    48 % predicted  FEV1: 0.72 L     35 % predicted     After administration of bronchodilator   FEV1/FVC Ratio: 59%  FVC: 1.30 L, 51% predicted, 7% change  FEV1: 0.77 L, 38% predicted, 6% change     Lung volumes by body plethysmography:   Total Lung Capacity 115 % predicted   Residual volume 210 % predicted     DLCO corrected for patients hemoglobin level: 42  %    Imaging:  I personally reviewed the images on the PAC system pertinent to today's visit  CT Chest 7/26/23  Atelectasis, scarring in the posterior right lower lobe with some bronchial branch narrowing and occlusion and trace right effusion. Superimposed infection to be excluded clinically Mucous debris in the distal posterior trachea tracking into the bronchi bilaterally.      Review of Systems:  Aside from what is mentioned in the HPI, the review of systems otherwise negative.    Current Medications:    Current Outpatient Medications:     Advair Diskus 250-50 MCG/ACT inhaler, INHALE 1 PUFF 2 (TWO) TIMES A DAY RINSE MOUTH AFTER USE., Disp: 60 blister, Rfl: 1    atorvastatin (LIPITOR) 40 mg tablet, TAKE ONE TABLET BY MOUTH DAILY WITH DINNER AS DIRECTED, Disp: 30 tablet, Rfl: 0    Biotin 38502 MCG TABS, Take by mouth, Disp: , Rfl:     buPROPion (Wellbutrin SR) 150 mg 12 hr tablet, Take 1 tablet (150 mg total) by mouth 2 (two) times a day, Disp: 60 tablet, Rfl: 2    cetirizine (ZyrTEC) 10 MG chewable tablet, Chew 1 tablet (10 mg total) daily, Disp: 90 tablet, Rfl: 2    ferrous sulfate 324 (65 Fe) mg, Take 1 tablet (324 mg total) by mouth daily before breakfast, Disp: 30 tablet, Rfl: 0    gabapentin (NEURONTIN) 400 mg capsule, Take 400 mg by mouth 3 (three) times a day, Disp: , Rfl:     hydrOXYzine HCL (ATARAX) 25 mg tablet, Take 25 mg by mouth 3 (three) times a day as needed, Disp: , Rfl:     levalbuterol (XOPENEX) 1.25 mg/0.5 mL nebulizer solution, Take 0.5 mL (1.25 mg total) by nebulization every 8 (eight) hours as needed for wheezing, Disp: 180 mL, Rfl: 5    levothyroxine 100 mcg tablet, TAKE 1 TABLET DAILY IN THE EARLY MORNING, Disp: 30 tablet, Rfl: 0    metoprolol tartrate (LOPRESSOR) 25 mg tablet, Take 0.5 tablets (12.5 mg total) by mouth every 12 (twelve) hours, Disp: 60 tablet, Rfl: 0    multivitamin (THERAGRAN) TABS, Take 1 tablet by mouth daily, Disp: , Rfl:     pantoprazole (PROTONIX) 40 mg tablet, Take  1 tablet (40 mg total) by mouth daily, Disp: 90 tablet, Rfl: 0    QUEtiapine (SEROquel) 25 mg tablet, Take 25 mg by mouth daily at bedtime as needed (for sleep), Disp: , Rfl:     umeclidinium (Incruse Ellipta) 62.5 mcg/actuation AEPB inhaler, Inhale 1 puff daily, Disp: 30 each, Rfl: 2    Ventolin  (90 Base) MCG/ACT inhaler, INHALE 2 PUFFS EVERY 6 (SIX) HOURS AS NEEDED FOR WHEEZING OR SHORTNESS OF BREATH, Disp: 18 g, Rfl: 0    vitamin B-12 (VITAMIN B-12) 1,000 mcg tablet, Take 1 tablet (1,000 mcg total) by mouth daily, Disp: 30 tablet, Rfl: 0    docusate sodium (COLACE) 100 mg capsule, Take 1 capsule (100 mg total) by mouth daily Pt verbalized she takes this once daily (Patient not taking: Reported on 12/1/2023), Disp: , Rfl:     fluticasone (FLONASE) 50 mcg/act nasal spray, 1 SPRAY INTO EACH NOSTRIL DAILY (Patient not taking: Reported on 12/27/2023), Disp: 16 g, Rfl: 3    polyethylene glycol (MIRALAX) 17 g packet, Take 17 g by mouth daily as needed (constipation) (Patient not taking: Reported on 12/27/2023), Disp: 20 each, Rfl: 0    Historical Information   Past Medical History:   Diagnosis Date    Anxiety     Depression     Disease of thyroid gland     Elevated troponin 5/31/2023    History of alcohol abuse 5/31/2023    Substance abuse (HCC)      Past Surgical History:   Procedure Laterality Date    BACK SURGERY  01/01/1973    scolosis rods    CARDIAC CATHETERIZATION N/A 07/25/2023    Procedure: Cardiac catheterization;  Surgeon: Lizet Ibarra MD;  Location: [facility];  Service: Cardiology    COLONOSCOPY      ELBOW SURGERY  05/01/2018    WISDOM TOOTH EXTRACTION       Social History   Social History     Tobacco Use   Smoking Status Some Days    Current packs/day: 1.50    Average packs/day: 1.5 packs/day for 30.0 years (45.0 ttl pk-yrs)    Types: Cigarettes   Smokeless Tobacco Never   Tobacco Comments    Currently smokes 1 ppd 9/5/2023       Family History:   Family History   Problem Relation Age  "of Onset    Colon cancer Mother     Breast cancer Mother     Cancer Mother     Leukemia Father          PhysicalExamination:  Vitals:   /74 (BP Location: Left arm, Patient Position: Sitting, Cuff Size: Standard)   Pulse 67   Temp 97.8 °F (36.6 °C) (Temporal)   Resp 12   Ht 4' 10\" (1.473 m)   Wt 52.2 kg (115 lb)   SpO2 97%   BMI 24.04 kg/m²     Appearance -- NAD, speaking full sentences  HEENT -- anicteric sclera, clear OP, MMM  Neck -- no JVD  Heart -- RRR, no murmurs  Lungs -- CTAB  Abdomen -- soft, NTND, +bs  Extremities -- WWP, no LE edema  Skin -- no rash  Neuro -- A&Ox3, wnl  Psych -- no obvious depression or hallucination        Diagnostic Data:  Labs:  I personally reviewed the most recent laboratory data pertinent to today's visit    Lab Results   Component Value Date    WBC 12.64 (H) 07/27/2023    HGB 11.7 07/27/2023    HCT 36.3 07/27/2023    MCV 92 07/27/2023     07/27/2023     Lab Results   Component Value Date    CALCIUM 9.2 07/27/2023    K 3.6 07/27/2023    CO2 30 07/27/2023    CL 99 07/27/2023    BUN 15 07/27/2023    CREATININE 0.50 (L) 07/27/2023     No results found for: \"IGE\"  Lab Results   Component Value Date    ALT 8 07/27/2023    AST 8 (L) 07/27/2023    ALKPHOS 45 07/27/2023             Immunization History   Administered Date(s) Administered    COVID-19 MODERNA VACC 0.5 ML IM 04/27/2021    Influenza, injectable, quadrivalent, preservative free 0.5 mL 10/21/2020, 10/16/2023    Pneumococcal Conjugate Vaccine 20-valent (Pcv20), Polysace 06/01/2023    Pneumococcal Polysaccharide PPV23 01/09/2020    Td (adult), adsorbed 05/28/2018      Tobacco Cessation  Advised to quit and impact of smoking  Assessed willingness to attempt to quit  Provided methods and skills for cessation  Discussed Medication management of smoking session drugs   Resources and/or medications provided  Patient set quit date  Follow-up arranged  Amount of time spent counseling patient 11 minutes    I discussed " with her that she is a candidate for lung cancer CT screening.     The following Shared Decision-Making points were covered:  Benefits of screening were discussed, including the rates of reduction in death from lung cancer and other causes.  Harms of screening were reviewed, including false positive tests, radiation exposure levels, risks of invasive procedures, risks of complications of screening, and risk of overdiagnosis.  I counseled on the importance of adherence to annual lung cancer LDCT screening, impact of co-morbidities, and ability or willingness to undergo diagnosis and treatment.  I counseled on the importance of maintaining abstinence as a former smoker or was counseled on the importance of smoking cessation if a current smoker    Review of Eligibility Criteria: She meets all of the criteria for Lung Cancer Screening.   She is 63 y.o.   She has 20 pack year tobacco history and is a current smoker or has quit within the past 15 years  She presents no signs or symptoms of lung cancer    After discussion, the patient decided to elect lung cancer screening.      I have spent a total time of 40 minutes on 12/27/23 in caring for this patient including Diagnostic results, Prognosis, Risks and benefits of tx options, Instructions for management, Patient and family education, Importance of tx compliance, Risk factor reductions, Impressions, Counseling / Coordination of care, Documenting in the medical record, Reviewing / ordering tests, medicine, procedures  , Obtaining or reviewing history  , and Communicating with other healthcare professionals .

## 2023-12-28 ENCOUNTER — TELEPHONE (OUTPATIENT)
Dept: GASTROENTEROLOGY | Facility: CLINIC | Age: 63
End: 2023-12-28

## 2023-12-28 LAB
DME PARACHUTE DELIVERY DATE REQUESTED: NORMAL
DME PARACHUTE ITEM DESCRIPTION: NORMAL
DME PARACHUTE ORDER STATUS: NORMAL
DME PARACHUTE SUPPLIER NAME: NORMAL
DME PARACHUTE SUPPLIER PHONE: NORMAL

## 2023-12-28 NOTE — TELEPHONE ENCOUNTER
----- Message from William Tucker MD sent at 12/28/2023 10:29 AM EST -----  We do not do EUS's at [facility].    ----- Message -----  From: Khushi Navarro RN  Sent: 12/28/2023   9:35 AM EST  To: William Tucker MD; #    Hi, would this patient be able to be scheduled with Dr. Willson at [facility] for her endoscopic ultrasound to avoid insurance authorization issues? Thank you  ----- Message -----  From: William Tucker MD  Sent: 12/22/2023   3:24 PM EST  To: [facility]; #    She needs an endoscopic ultrasound for gastric nodule-however she may not be able to come to [place], if not able to come to [place] due to insurance, will place external referral.  She also needs a repeat EGD in 1 year for gastric intestinal metaplasia.  Advised patient to stop smoking.  I have already spoken to the patient.  Colonoscopy in 5 years.

## 2024-01-04 ENCOUNTER — TELEPHONE (OUTPATIENT)
Dept: GASTROENTEROLOGY | Facility: AMBULARY SURGERY CENTER | Age: 64
End: 2024-01-04

## 2024-01-04 DIAGNOSIS — K31.89 GASTRIC NODULE: Primary | ICD-10-CM

## 2024-01-08 DIAGNOSIS — E03.9 HYPOTHYROID: ICD-10-CM

## 2024-01-08 DIAGNOSIS — L82.0 SEBORRHEIC KERATOSES, INFLAMED: ICD-10-CM

## 2024-01-09 RX ORDER — LEVOTHYROXINE SODIUM 0.1 MG/1
TABLET ORAL
Qty: 30 TABLET | Refills: 0 | Status: SHIPPED | OUTPATIENT
Start: 2024-01-09

## 2024-01-16 ENCOUNTER — TELEPHONE (OUTPATIENT)
Age: 64
End: 2024-01-16

## 2024-01-16 NOTE — TELEPHONE ENCOUNTER
Patients GI provider: KRISSY Valentine    Number to return call: [phone]    Reason for call: Scheduling per referral - Spoke with patient told she needs a EUS but she has to speak with her insurance company first then cb to schedule making she things covered.   Patient then requested a copy of the actual referral be sent / resent to the provided number she gave a few weeks ago. Verified fax number to be used to forward KRISSY Vicente referral.  Patient also mentioned getting a cb once the referral faxed referral to [phone]     Scheduled procedure/appointment date if applicable: N/A

## 2024-01-24 ENCOUNTER — TELEPHONE (OUTPATIENT)
Dept: CARDIOLOGY CLINIC | Facility: CLINIC | Age: 64
End: 2024-01-24

## 2024-02-06 LAB
DME PARACHUTE DELIVERY DATE EXPECTED: NORMAL
DME PARACHUTE DELIVERY DATE REQUESTED: NORMAL
DME PARACHUTE ITEM DESCRIPTION: NORMAL
DME PARACHUTE ORDER STATUS: NORMAL
DME PARACHUTE SUPPLIER NAME: NORMAL
DME PARACHUTE SUPPLIER PHONE: NORMAL

## 2024-02-08 ENCOUNTER — TELEPHONE (OUTPATIENT)
Dept: FAMILY MEDICINE CLINIC | Facility: CLINIC | Age: 64
End: 2024-02-08

## 2024-02-08 DIAGNOSIS — E03.9 HYPOTHYROID: ICD-10-CM

## 2024-02-08 DIAGNOSIS — Z71.6 ENCOUNTER FOR TOBACCO USE CESSATION COUNSELING: ICD-10-CM

## 2024-02-08 DIAGNOSIS — E78.5 HLD (HYPERLIPIDEMIA): ICD-10-CM

## 2024-02-08 NOTE — TELEPHONE ENCOUNTER
Patient reports having recent labs done outside network at [facility], she was informed that her TSH levels were elevated. Patient was not provided actual  levels. Patient is currently taking Levothyroxine 100mcgs.   Informed patient she does have Active order for TSH 3rd generation with Free T4 reflex in her chart that she may to lab to have blood drawn, or she may contact [facility] to have recent results sent to out office. Patient agrees she will do one or the other.    Patient may be reached at the number listed below;  [phone]

## 2024-02-09 RX ORDER — BUPROPION HYDROCHLORIDE 150 MG/1
TABLET, EXTENDED RELEASE ORAL
Qty: 60 TABLET | Refills: 2 | Status: SHIPPED | OUTPATIENT
Start: 2024-02-09

## 2024-02-09 RX ORDER — LEVOTHYROXINE SODIUM 0.1 MG/1
TABLET ORAL
Qty: 30 TABLET | Refills: 0 | Status: SHIPPED | OUTPATIENT
Start: 2024-02-09

## 2024-02-09 RX ORDER — ATORVASTATIN CALCIUM 40 MG/1
TABLET, FILM COATED ORAL
Qty: 30 TABLET | Refills: 0 | Status: SHIPPED | OUTPATIENT
Start: 2024-02-09

## 2024-02-22 ENCOUNTER — TELEPHONE (OUTPATIENT)
Age: 64
End: 2024-02-22

## 2024-02-22 DIAGNOSIS — K31.89 GASTRIC NODULE: Primary | ICD-10-CM

## 2024-02-22 NOTE — TELEPHONE ENCOUNTER
Patients GI provider:  Dr. Tucker/Juan Luis ALVAREZ    Number to return call: [phone]    Reason for call: Pt calling because she needs a new referral for an external EUS. She states one was not available until June, and the other did not take her insurance. Please call the pt to advise who she should try to schedule with    Scheduled procedure/appointment date if applicable: nothing at this time

## 2024-02-22 NOTE — TELEPHONE ENCOUNTER
Called and spoke to patient; she is okay to wait until til Leslie she would like a call to get this scheduled when possible

## 2024-02-22 NOTE — TELEPHONE ENCOUNTER
Please let patient know there is a referral for gastroenterology ordered.  She will need to see a new provider prior to them being able to set up an EUS.  They would not likely set up an EUS without being seen in the office first.  She can call outside GI offices covered by her insurance, ensure they perform EUS and try to get appointment as soon as possible.  Otherwise, if she is able to get EUS done with us for the [place] date I suggest she take this date

## 2024-02-27 ENCOUNTER — TELEMEDICINE (OUTPATIENT)
Dept: FAMILY MEDICINE CLINIC | Facility: CLINIC | Age: 64
End: 2024-02-27
Payer: COMMERCIAL

## 2024-02-27 DIAGNOSIS — U07.1 TELEHEALTH ENCOUNTER FOR CONFIRMED COVID-19: Primary | ICD-10-CM

## 2024-02-27 PROCEDURE — 99213 OFFICE O/P EST LOW 20 MIN: CPT | Performed by: FAMILY MEDICINE

## 2024-02-27 PROCEDURE — G2211 COMPLEX E/M VISIT ADD ON: HCPCS | Performed by: FAMILY MEDICINE

## 2024-02-27 NOTE — LETTER
February 27, 2024    Patient: Iona Toribio  YOB: 1960  Date of Last Encounter: 2/8/2024      To whom it may concern:     Iona Toribio has tested positive for COVID-19 (Coronavirus). She may return to work on 02/29/24, which is 5 days from illness onset (provided symptoms are improving) and 24 hours without fever or use of antipyretics, with masking for an additional 5 days .    Sincerely,         Amol Rincon MD

## 2024-02-27 NOTE — PROGRESS NOTES
COVID-19 Outpatient Progress Note    Assessment/Plan:    Problem List Items Addressed This Visit    None  Visit Diagnoses       Telehealth encounter for confirmed COVID-19    -  Primary    Not a canidate for Plaxlovid. Mild symptoms. Risk factors COPD/Smoker. Patient advised on concervative managment and to take albuterol as needed.           Disposition:     Discussed symptom directed medication options with patient. Discussed vitamin D, vitamin C, and/or zinc supplementation with patient.     I have spent a total time of 20 minutes on the day of the encounter for this patient including discussing diagnostic results, discussing prognosis, risks and benefits of treatment options, instructions for management, importance of treatment compliance, impressions and documenting in the medical record.       Encounter provider: Amol Rincon MD     Provider located at: [address] [address]     Recent Visits  Date Type Provider Dept   02/27/24 Telemedicine Amol Rnicon MD [facility]   Showing recent visits within past 7 days and meeting all other requirements  Future Appointments  No visits were found meeting these conditions.  Showing future appointments within next 150 days and meeting all other requirements     This virtual check-in was done via [employer] and patient was informed that this is a secure, HIPAA-compliant platform. She agrees to proceed.    Patient agrees to participate in a virtual check in via telephone or video visit instead of presenting to the office to address urgent/immediate medical needs. Patient is aware this is a billable service. She acknowledged consent and understanding of privacy and security of the video platform. The patient has agreed to participate and understands they can discontinue the visit at any time.    After connecting through [employer], the patient was identified by name and  "date of birth. Iona Toribio was informed that this was a telemedicine visit and that the exam was being conducted confidentially over secure lines. My office door was closed. No one else was in the room. Iona Toribio acknowledged consent and understanding of privacy and security of the telemedicine visit. I informed the patient that I have reviewed her record in Epic and presented the opportunity for her to ask any questions regarding the visit today. The patient agreed to participate.     Verification of patient location:  Patient is located in the following state in which I hold an active license: [place]    Subjective:   Iona Toribio is a 63 y.o. female who is concerned about COVID-19. Patient's symptoms include chills, fatigue, malaise, nasal congestion, rhinorrhea, sore throat, cough, nausea, myalgias and headache. Patient denies fever, anosmia, loss of taste (different the baseline), shortness of breath (at baseline), chest tightness, abdominal pain, vomiting and diarrhea.     - Date of symptom onset: 2/24/2024      COVID-19 vaccination status: Fully vaccinated (primary series)    Patient describes feeling like she had a really bad cold on the 24th with muscle aches and tiredness however since that time she reports her symptoms have gradually decreased and not as bad as the 24th.  Patient reports the Tylenol still resolves the muscle aches and headaches and she is congested however is clearing the mucus.  Patient reports she works as a  for [employer] and wanted to know if it would be safe to go back to work on the 29th.  Patient was counseled on new CDC recommendations in fever free mild symptoms patients.    No results found for: \"SARSCOV2\", \"[id number]\", \"SARSCORONAVI\", \"CORONAVIRUSR\", \"SARSCOVAG\", \"SARSCOVAGH\"    Review of Systems   Constitutional:  Positive for chills and fatigue. Negative for appetite change and fever.   HENT:  Positive for congestion, rhinorrhea and sore throat. "    Eyes:  Negative for photophobia and visual disturbance.   Respiratory:  Positive for cough. Negative for chest tightness, shortness of breath (at baseline) and wheezing.    Cardiovascular:  Negative for chest pain and palpitations.   Gastrointestinal:  Positive for nausea. Negative for abdominal pain, diarrhea and vomiting.   Musculoskeletal:  Positive for myalgias. Negative for arthralgias.   Skin:  Negative for rash.   Neurological:  Positive for headaches.   Psychiatric/Behavioral:  Negative for confusion and sleep disturbance.      Current Outpatient Medications on File Prior to Visit   Medication Sig    Advair Diskus 250-50 MCG/ACT inhaler INHALE 1 PUFF 2 (TWO) TIMES A DAY RINSE MOUTH AFTER USE.    atorvastatin (LIPITOR) 40 mg tablet TAKE ONE TABLET DAILY WITH DINNER AS DIRECTED    Biotin 60932 MCG TABS Take by mouth    buPROPion (WELLBUTRIN SR) 150 mg 12 hr tablet TAKE 1 TABLET TWO TIMES A DAY    cetirizine (ZyrTEC) 10 MG chewable tablet Chew 1 tablet (10 mg total) daily    docusate sodium (COLACE) 100 mg capsule Take 1 capsule (100 mg total) by mouth daily Pt verbalized she takes this once daily (Patient not taking: Reported on 12/1/2023)    ferrous sulfate 324 (65 Fe) mg Take 1 tablet (324 mg total) by mouth daily before breakfast    fluticasone (FLONASE) 50 mcg/act nasal spray 1 SPRAY INTO EACH NOSTRIL DAILY (Patient not taking: Reported on 12/27/2023)    gabapentin (NEURONTIN) 400 mg capsule Take 400 mg by mouth 3 (three) times a day    hydrOXYzine HCL (ATARAX) 25 mg tablet Take 25 mg by mouth 3 (three) times a day as needed    levalbuterol (XOPENEX) 1.25 mg/0.5 mL nebulizer solution Take 0.5 mL (1.25 mg total) by nebulization every 8 (eight) hours as needed for wheezing    metoprolol tartrate (LOPRESSOR) 25 mg tablet TAKE A HALF TABLET BY MOUTH EVERY 12 HOURS    multivitamin (THERAGRAN) TABS Take 1 tablet by mouth daily    nicotine (NICODERM CQ) 14 mg/24hr TD 24 hr patch Place 1 patch on the skin over  24 hours every 24 hours    nicotine (NICODERM CQ) 7 mg/24hr TD 24 hr patch Place 1 patch on the skin over 24 hours every 24 hours    pantoprazole (PROTONIX) 40 mg tablet Take 1 tablet (40 mg total) by mouth daily    polyethylene glycol (MIRALAX) 17 g packet Take 17 g by mouth daily as needed (constipation) (Patient not taking: Reported on 12/27/2023)    QUEtiapine (SEROquel) 25 mg tablet Take 25 mg by mouth daily at bedtime as needed (for sleep)    umeclidinium (Incruse Ellipta) 62.5 mcg/actuation AEPB inhaler Inhale 1 puff daily    Ventolin  (90 Base) MCG/ACT inhaler INHALE 2 PUFFS EVERY 6 (SIX) HOURS AS NEEDED FOR WHEEZING OR SHORTNESS OF BREATH    vitamin B-12 (VITAMIN B-12) 1,000 mcg tablet Take 1 tablet (1,000 mcg total) by mouth daily       Objective:    There were no vitals taken for this visit.       Physical Exam  Vitals reviewed: Limited due to virtual visit.   Constitutional:       Appearance: Normal appearance.   Pulmonary:      Comments: No obvious abnormal breath sounds  Neurological:      Mental Status: She is alert and oriented to person, place, and time.   Psychiatric:         Mood and Affect: Mood normal.         Behavior: Behavior normal.       Amol Rincon MD

## 2024-02-28 DIAGNOSIS — E03.9 HYPOTHYROID: ICD-10-CM

## 2024-02-28 RX ORDER — LEVOTHYROXINE SODIUM 112 UG/1
112 TABLET ORAL DAILY
Qty: 60 TABLET | Refills: 0 | Status: SHIPPED | OUTPATIENT
Start: 2024-02-28

## 2024-02-28 NOTE — PROGRESS NOTES
Patient called the office on 2/8/24 about elevated TSH level.  At that time I was not able to review the TSH on Care Everywhere.  Result was updated on epic on 2/17 per chart review.    TSH 9.19, T4 0.89  Patient is currently on levothyroxine 100 mcg daily.  Recommended to increase to levothyroxine 112 mcg daily and we will repeat TSH in 6 weeks.

## 2024-03-13 ENCOUNTER — PREP FOR PROCEDURE (OUTPATIENT)
Dept: GASTROENTEROLOGY | Facility: CLINIC | Age: 64
End: 2024-03-13

## 2024-03-13 DIAGNOSIS — K31.89 GASTRIC NODULE: Primary | ICD-10-CM

## 2024-03-18 DIAGNOSIS — K29.70 GASTRITIS WITHOUT BLEEDING, UNSPECIFIED CHRONICITY, UNSPECIFIED GASTRITIS TYPE: ICD-10-CM

## 2024-03-18 DIAGNOSIS — E78.5 HLD (HYPERLIPIDEMIA): ICD-10-CM

## 2024-03-18 RX ORDER — ATORVASTATIN CALCIUM 40 MG/1
TABLET, FILM COATED ORAL
Qty: 30 TABLET | Refills: 0 | Status: SHIPPED | OUTPATIENT
Start: 2024-03-18

## 2024-03-18 RX ORDER — PANTOPRAZOLE SODIUM 40 MG/1
40 TABLET, DELAYED RELEASE ORAL DAILY
Qty: 90 TABLET | Refills: 1 | Status: SHIPPED | OUTPATIENT
Start: 2024-03-18

## 2024-03-20 NOTE — TELEPHONE ENCOUNTER
Patient called requesting refill for pantoprazole. Patient made aware medication was refilled on 3/18/24 for 90  with 1 refills to [facility] pharmacy. Patient instructed to contact the pharmacy to obtain refills of medication. Patient verbalized understanding.

## 2024-03-21 DIAGNOSIS — J20.9 ACUTE BRONCHITIS, UNSPECIFIED ORGANISM: ICD-10-CM

## 2024-03-21 NOTE — TELEPHONE ENCOUNTER
Vm on refill line:     Hi, my name is Iona Toribio. Date of birth August 1st, 1960 I need Ventolin HFA the inhaler thing refilled. I'm not sure which doctor wrote the prescription. My pharmacy is [facility] [phone] and my phone number My phone number is [phone]. Thank you. Bye, bye.

## 2024-03-24 RX ORDER — ALBUTEROL SULFATE 90 UG/1
2 AEROSOL, METERED RESPIRATORY (INHALATION) EVERY 6 HOURS PRN
Qty: 18 G | Refills: 1 | Status: SHIPPED | OUTPATIENT
Start: 2024-03-24

## 2024-03-26 DIAGNOSIS — J44.9 CHRONIC OBSTRUCTIVE PULMONARY DISEASE, UNSPECIFIED COPD TYPE (HCC): ICD-10-CM

## 2024-03-27 DIAGNOSIS — L82.0 SEBORRHEIC KERATOSES, INFLAMED: ICD-10-CM

## 2024-03-27 NOTE — TELEPHONE ENCOUNTER
VM left on Rx Line     My name is Iona Damian, August 1st, 1960. The prescription is [id number] metoprolol Tartrate and the pharmacy is [facility]. My phone number is [phone]. Thank you. Juan David.  You received a voice mail from IONA WHITE.

## 2024-03-29 RX ORDER — UMECLIDINIUM 62.5 UG/1
1 AEROSOL, POWDER ORAL DAILY
Qty: 30 EACH | Refills: 3 | Status: SHIPPED | OUTPATIENT
Start: 2024-03-29

## 2024-04-22 DIAGNOSIS — E78.5 HLD (HYPERLIPIDEMIA): ICD-10-CM

## 2024-04-22 LAB
DME PARACHUTE DELIVERY DATE ACTUAL: NORMAL
DME PARACHUTE DELIVERY DATE EXPECTED: NORMAL
DME PARACHUTE DELIVERY DATE REQUESTED: NORMAL
DME PARACHUTE ITEM DESCRIPTION: NORMAL
DME PARACHUTE ORDER STATUS: NORMAL
DME PARACHUTE SUPPLIER NAME: NORMAL
DME PARACHUTE SUPPLIER PHONE: NORMAL

## 2024-04-22 RX ORDER — ATORVASTATIN CALCIUM 40 MG/1
TABLET, FILM COATED ORAL
Qty: 30 TABLET | Refills: 0 | Status: SHIPPED | OUTPATIENT
Start: 2024-04-22

## 2024-04-22 NOTE — TELEPHONE ENCOUNTER
VM left on RX line:    Hi, this is Iona KWOK need a refill for Echo Bestatin 40 milligrams. My pharmacy is [facility]. Their number is [phone]. My phone number is [phone]. The quantity is 30 and it's from [facility]. Thank you. urszula Fall.    Called patient and confirmed that the RX was sent to the pharmacy

## 2024-05-05 DIAGNOSIS — L82.0 SEBORRHEIC KERATOSES, INFLAMED: ICD-10-CM

## 2024-05-05 DIAGNOSIS — J20.9 ACUTE BRONCHITIS, UNSPECIFIED ORGANISM: ICD-10-CM

## 2024-05-06 RX ORDER — ALBUTEROL SULFATE 90 UG/1
AEROSOL, METERED RESPIRATORY (INHALATION)
Qty: 18 G | Refills: 1 | Status: SHIPPED | OUTPATIENT
Start: 2024-05-06

## 2024-05-23 DIAGNOSIS — Z71.6 ENCOUNTER FOR TOBACCO USE CESSATION COUNSELING: ICD-10-CM

## 2024-05-23 DIAGNOSIS — E03.9 HYPOTHYROID: ICD-10-CM

## 2024-05-23 RX ORDER — LEVOTHYROXINE SODIUM 112 UG/1
112 TABLET ORAL DAILY
Qty: 60 TABLET | Refills: 0 | Status: SHIPPED | OUTPATIENT
Start: 2024-05-23

## 2024-05-23 RX ORDER — BUPROPION HYDROCHLORIDE 150 MG/1
TABLET, EXTENDED RELEASE ORAL
Qty: 60 TABLET | Refills: 2 | Status: SHIPPED | OUTPATIENT
Start: 2024-05-23

## 2024-05-30 DIAGNOSIS — E78.5 HLD (HYPERLIPIDEMIA): Primary | ICD-10-CM

## 2024-05-30 RX ORDER — ATORVASTATIN CALCIUM 40 MG/1
40 TABLET, FILM COATED ORAL DAILY
Qty: 90 TABLET | Refills: 3 | Status: SHIPPED | OUTPATIENT
Start: 2024-05-30

## 2024-05-30 NOTE — TELEPHONE ENCOUNTER
VM on rx line:    Hi, my name is Iona Toribio. My YOB: 1960 I'm calling for a refill of Atorvastatin 40 milligrams. My pharmacy is [facility]. Their number is [phone] and the quantity is 30 and I just don't have any real refills. Thank you. Bye bye.  You received a voice mail from IONA TORIBIO.

## 2024-05-31 ENCOUNTER — ANESTHESIA EVENT (OUTPATIENT)
Dept: ANESTHESIOLOGY | Facility: HOSPITAL | Age: 64
End: 2024-05-31

## 2024-05-31 ENCOUNTER — ANESTHESIA (OUTPATIENT)
Dept: ANESTHESIOLOGY | Facility: HOSPITAL | Age: 64
End: 2024-05-31

## 2024-05-31 NOTE — ANESTHESIA PREPROCEDURE EVALUATION
Procedure:  PRE-OP ONLY    Relevant Problems   CARDIO   (+) HTN (hypertension)   (+) Hyperlipidemia   (+) SVT (supraventricular tachycardia)      ENDO   (+) Hypothyroid      HEMATOLOGY   (+) Anemia      MUSCULOSKELETAL   (+) Chronic low back pain      NEURO/PSYCH   (+) Chronic low back pain   (+) Depression with anxiety   (+) Numbness and tingling in both hands      PULMONARY   (+) Chronic respiratory failure with hypoxia (HCC)   (+) Oxygen dependent - 2 lpm (as needed)   (+) Panlobular emphysema (HCC)   (+) Smoking      Gastric nodule    Cardiac Cath 7/2023:    Mid RCA lesion is 40% stenosed.  Mild calcification of proximal and mid RCA noted    The left ventricular systolic function is normal.  EF is 60%.  There is no gradient across aortic valve    Mild nonobstructive coronary artery disease with tortuous arteries of distal branches.    TTE 6/2023:    Left Ventricle: Left ventricular cavity size is normal. Wall thickness is normal. The left ventricular ejection fraction is 55% by visual estimation. Systolic function is normal. Wall motion is normal. Diastolic function is normal.    Right Ventricle: Systolic function is normal. Normal tricuspid annular plane systolic excursion (TAPSE) > 1.7 cm.    Left Atrium: The atrium is normal in size.    Right Atrium: The atrium is normal in size.    Mitral Valve: There is mild annular calcification.    IVC/SVC: The inferior vena cava is normal in size.    Lab Results   Component Value Date    WBC 12.64 (H) 07/27/2023    HGB 11.7 07/27/2023    HCT 36.3 07/27/2023    MCV 92 07/27/2023     07/27/2023     Lab Results   Component Value Date    SODIUM 135 07/27/2023    K 3.6 07/27/2023    CL 99 07/27/2023    CO2 30 07/27/2023    BUN 15 07/27/2023    CREATININE 0.50 (L) 07/27/2023    GLUC 163 (H) 07/27/2023    CALCIUM 9.2 07/27/2023     Lab Results   Component Value Date    INR 1.01 07/25/2023    INR 1.06 07/23/2023    INR 0.92 05/31/2023    PROTIME 13.4 07/25/2023    PROTIME  13.9 07/23/2023    PROTIME 12.5 05/31/2023     Lab Results   Component Value Date    HGBA1C 5.3 05/31/2023                 Anesthesia Plan  ASA Score- 4     Anesthesia Type- IV sedation with anesthesia with ASA Monitors.         Additional Monitors:     Airway Plan:            Plan Factors-    Chart reviewed. EKG reviewed.  Existing labs reviewed. Patient summary reviewed.                  Induction- intravenous.    Postoperative Plan-         Informed Consent-

## 2024-06-03 ENCOUNTER — HOSPITAL ENCOUNTER (OUTPATIENT)
Dept: GASTROENTEROLOGY | Facility: HOSPITAL | Age: 64
Setting detail: OUTPATIENT SURGERY
Discharge: HOME | End: 2024-06-03
Attending: INTERNAL MEDICINE | Admitting: INTERNAL MEDICINE
Payer: COMMERCIAL

## 2024-06-03 ENCOUNTER — ANESTHESIA EVENT (OUTPATIENT)
Dept: GASTROENTEROLOGY | Facility: HOSPITAL | Age: 64
End: 2024-06-03

## 2024-06-03 ENCOUNTER — ANESTHESIA (OUTPATIENT)
Dept: GASTROENTEROLOGY | Facility: HOSPITAL | Age: 64
End: 2024-06-03

## 2024-06-03 VITALS
SYSTOLIC BLOOD PRESSURE: 112 MMHG | OXYGEN SATURATION: 93 % | HEIGHT: 58 IN | TEMPERATURE: 97.7 F | RESPIRATION RATE: 16 BRPM | WEIGHT: 115 LBS | DIASTOLIC BLOOD PRESSURE: 56 MMHG | BODY MASS INDEX: 24.14 KG/M2 | HEART RATE: 75 BPM

## 2024-06-03 DIAGNOSIS — K31.89 GASTRIC NODULE: ICD-10-CM

## 2024-06-03 PROCEDURE — 88305 TISSUE EXAM BY PATHOLOGIST: CPT | Performed by: PATHOLOGY

## 2024-06-03 PROCEDURE — 43239 EGD BIOPSY SINGLE/MULTIPLE: CPT | Performed by: INTERNAL MEDICINE

## 2024-06-03 PROCEDURE — 43236 UPPR GI SCOPE W/SUBMUC INJ: CPT | Performed by: INTERNAL MEDICINE

## 2024-06-03 PROCEDURE — 88342 IMHCHEM/IMCYTCHM 1ST ANTB: CPT | Performed by: PATHOLOGY

## 2024-06-03 PROCEDURE — 88313 SPECIAL STAINS GROUP 2: CPT | Performed by: PATHOLOGY

## 2024-06-03 PROCEDURE — 88341 IMHCHEM/IMCYTCHM EA ADD ANTB: CPT | Performed by: PATHOLOGY

## 2024-06-03 PROCEDURE — 43238 EGD US FINE NEEDLE BX/ASPIR: CPT | Performed by: INTERNAL MEDICINE

## 2024-06-03 RX ORDER — PROPOFOL 10 MG/ML
INJECTION, EMULSION INTRAVENOUS AS NEEDED
Status: DISCONTINUED | OUTPATIENT
Start: 2024-06-03 | End: 2024-06-03

## 2024-06-03 RX ORDER — SODIUM CHLORIDE 9 MG/ML
INJECTION, SOLUTION INTRAVENOUS CONTINUOUS PRN
Status: DISCONTINUED | OUTPATIENT
Start: 2024-06-03 | End: 2024-06-03

## 2024-06-03 RX ADMIN — PROPOFOL 80 MG: 10 INJECTION, EMULSION INTRAVENOUS at 14:07

## 2024-06-03 RX ADMIN — SODIUM CHLORIDE: 0.9 INJECTION, SOLUTION INTRAVENOUS at 14:07

## 2024-06-03 RX ADMIN — PROPOFOL 50 MG: 10 INJECTION, EMULSION INTRAVENOUS at 14:11

## 2024-06-03 RX ADMIN — PROPOFOL 50 MG: 10 INJECTION, EMULSION INTRAVENOUS at 14:14

## 2024-06-03 RX ADMIN — PROPOFOL 50 MG: 10 INJECTION, EMULSION INTRAVENOUS at 14:26

## 2024-06-03 RX ADMIN — PROPOFOL 50 MG: 10 INJECTION, EMULSION INTRAVENOUS at 14:18

## 2024-06-03 NOTE — ANESTHESIA PREPROCEDURE EVALUATION
Procedure:  ENDOSCOPIC ULTRASOUND (UPPER)    Relevant Problems   ANESTHESIA (within normal limits)      CARDIO   (+) HTN (hypertension)   (+) Hyperlipidemia   (+) SVT (supraventricular tachycardia)      ENDO   (+) Hypothyroid      GI/HEPATIC (within normal limits)      /RENAL (within normal limits)      HEMATOLOGY   (+) Anemia      MUSCULOSKELETAL   (+) Chronic low back pain      NEURO/PSYCH   (+) Chronic low back pain   (+) Depression with anxiety   (+) Numbness and tingling in both hands      PULMONARY   (+) Chronic respiratory failure with hypoxia (HCC)   (+) Oxygen dependent - 2 lpm (as needed)   (+) Panlobular emphysema (HCC)   (+) Smoking      Gastric nodule    H/o SVT s/p ablation 2/2024  COPD, home O2 PRN    Cardiac Cath 7/2023:    Mid RCA lesion is 40% stenosed.  Mild calcification of proximal and mid RCA noted    The left ventricular systolic function is normal.  EF is 60%.  There is no gradient across aortic valve    Mild nonobstructive coronary artery disease with tortuous arteries of distal branches.    TTE 6/2023:    Left Ventricle: Left ventricular cavity size is normal. Wall thickness is normal. The left ventricular ejection fraction is 55% by visual estimation. Systolic function is normal. Wall motion is normal. Diastolic function is normal.    Right Ventricle: Systolic function is normal. Normal tricuspid annular plane systolic excursion (TAPSE) > 1.7 cm.    Left Atrium: The atrium is normal in size.    Right Atrium: The atrium is normal in size.    Mitral Valve: There is mild annular calcification.    IVC/SVC: The inferior vena cava is normal in size.    Lab Results   Component Value Date    WBC 12.64 (H) 07/27/2023    HGB 11.7 07/27/2023    HCT 36.3 07/27/2023    MCV 92 07/27/2023     07/27/2023     Lab Results   Component Value Date    SODIUM 135 07/27/2023    K 3.6 07/27/2023    CL 99 07/27/2023    CO2 30 07/27/2023    BUN 15 07/27/2023    CREATININE 0.50 (L) 07/27/2023    GLUC 163 (H)  07/27/2023    CALCIUM 9.2 07/27/2023     Lab Results   Component Value Date    INR 1.01 07/25/2023    INR 1.06 07/23/2023    INR 0.92 05/31/2023    PROTIME 13.4 07/25/2023    PROTIME 13.9 07/23/2023    PROTIME 12.5 05/31/2023     Lab Results   Component Value Date    HGBA1C 5.3 05/31/2023            Physical Exam    Airway    Mallampati score: II  TM Distance: >3 FB  Neck ROM: full     Dental        Cardiovascular  Cardiovascular exam normal    Pulmonary  Pulmonary exam normal     Other Findings  post-pubertal.      Anesthesia Plan  ASA Score- 3     Anesthesia Type- IV sedation with anesthesia with ASA Monitors.         Additional Monitors:     Airway Plan:            Plan Factors-Exercise tolerance (METS): >4 METS.    Chart reviewed. EKG reviewed.  Existing labs reviewed. Patient summary reviewed.                  Induction- intravenous.    Postoperative Plan-         Informed Consent- Anesthetic plan and risks discussed with patient.  I personally reviewed this patient with the CRNA. Discussed and agreed on the Anesthesia Plan with the CRNA..

## 2024-06-03 NOTE — H&P
"History and Physical -  [facility]  Iona Toribio 63 y.o. female MRN: [id number]    HPI: Iona Toribio is a 63 y.o. year old female who presents with gastric nodule.       Review of Systems    Historical Information   Past Medical History:   Diagnosis Date    Anxiety     Depression     Disease of thyroid gland     Elevated troponin 5/31/2023    History of alcohol abuse 5/31/2023    Substance abuse (HCC)      Past Surgical History:   Procedure Laterality Date    BACK SURGERY  01/01/1973    scolosis rods    CARDIAC CATHETERIZATION N/A 07/25/2023    Procedure: Cardiac catheterization;  Surgeon: Lizet Ibarra MD;  Location: [facility];  Service: Cardiology    COLONOSCOPY      ELBOW SURGERY  05/01/2018    WISDOM TOOTH EXTRACTION       Social History   Social History     Substance and Sexual Activity   Alcohol Use Not Currently    Comment: none 7/2018-went to rehab     Social History     Substance and Sexual Activity   Drug Use Not Currently     Social History     Tobacco Use   Smoking Status Some Days    Current packs/day: 1.50    Average packs/day: 1.5 packs/day for 30.0 years (45.0 ttl pk-yrs)    Types: Cigarettes   Smokeless Tobacco Never   Tobacco Comments    Currently smokes 1 ppd 9/5/2023     Family History   Problem Relation Age of Onset    Colon cancer Mother     Breast cancer Mother     Cancer Mother     Leukemia Father        Meds/Allergies     Not in a hospital admission.    Allergies   Allergen Reactions    Other Allergic Rhinitis     seasonal       Objective     /69   Pulse 72   Temp 99.1 °F (37.3 °C) (Tympanic)   Resp 17   Ht 4' 10\" (1.473 m)   Wt 52.2 kg (115 lb)   SpO2 96%   BMI 24.04 kg/m²       PHYSICAL EXAM    Gen: NAD  CV: RRR  CHEST: Clear  ABD: soft, NT/ND  EXT: no edema  Neuro: AAO      ASSESSMENT/PLAN:  This is a 63 y.o. year old female here for EGD/ EUS for gastric nodule.     PLAN:   Procedure: EGD/ EUS      "

## 2024-06-03 NOTE — ANESTHESIA POSTPROCEDURE EVALUATION
Post-Op Assessment Note    CV Status:  Stable  Pain Score: 0    Pain management: adequate       Mental Status:  Sleepy   Hydration Status:  Euvolemic   PONV Controlled:  Controlled   Airway Patency:  Patent     Post Op Vitals Reviewed: Yes    No anethesia notable event occurred.    Staff: Anesthesiologist, CRNA               BP (!) 84/45 (06/03/24 1440)    Temp 97.7 °F (36.5 °C) (06/03/24 1440)    Pulse 70 (06/03/24 1440)   Resp 16 (06/03/24 1440)    SpO2 96 % (06/03/24 1440)

## 2024-06-06 PROCEDURE — 88313 SPECIAL STAINS GROUP 2: CPT | Performed by: PATHOLOGY

## 2024-06-06 PROCEDURE — 88305 TISSUE EXAM BY PATHOLOGIST: CPT | Performed by: PATHOLOGY

## 2024-06-06 PROCEDURE — 88342 IMHCHEM/IMCYTCHM 1ST ANTB: CPT | Performed by: PATHOLOGY

## 2024-06-06 PROCEDURE — 88341 IMHCHEM/IMCYTCHM EA ADD ANTB: CPT | Performed by: PATHOLOGY

## 2024-06-17 ENCOUNTER — PROCEDURE VISIT (OUTPATIENT)
Age: 64
End: 2024-06-17

## 2024-06-17 VITALS
DIASTOLIC BLOOD PRESSURE: 82 MMHG | TEMPERATURE: 97.9 F | SYSTOLIC BLOOD PRESSURE: 146 MMHG | BODY MASS INDEX: 24.35 KG/M2 | WEIGHT: 116 LBS | OXYGEN SATURATION: 100 % | HEIGHT: 58 IN | HEART RATE: 66 BPM

## 2024-06-17 DIAGNOSIS — H61.23 BILATERAL IMPACTED CERUMEN: Primary | ICD-10-CM

## 2024-06-17 DIAGNOSIS — K31.83 ACHLORHYDRIA: ICD-10-CM

## 2024-06-17 DIAGNOSIS — K29.40 AUTOIMMUNE GASTRITIS: Primary | ICD-10-CM

## 2024-06-17 DIAGNOSIS — R09.81 NASAL CONGESTION: ICD-10-CM

## 2024-06-17 PROCEDURE — 99203 OFFICE O/P NEW LOW 30 MIN: CPT | Performed by: FAMILY MEDICINE

## 2024-06-17 PROCEDURE — 69210 REMOVE IMPACTED EAR WAX UNI: CPT | Performed by: FAMILY MEDICINE

## 2024-06-17 RX ORDER — ASPIRIN 81 MG/1
81 TABLET ORAL DAILY
COMMUNITY
Start: 2024-02-17

## 2024-06-17 NOTE — PROGRESS NOTES
"[facility] Office visit    Assessment/Plan:     1. Bilateral impacted cerumen  -     carbamide peroxide (DEBROX) 6.5 % otic solution; Administer 5 drops into both ears 2 (two) times a day      Ear cerumen removal    Date/Time: 6/17/2024 11:00 AM    Performed by: Jona Pennington MD  Authorized by: Jona Pennington MD  Universal Protocol:  Consent given by: patient  Patient understanding: patient states understanding of the procedure being performed  Patient identity confirmed: verbally with patient    Patient location:  Clinic  Procedure details:     Location:  R ear and L ear    Procedure type: irrigation with instrumentation      Instrumentation: curette    Post-procedure details:     Patient tolerance of procedure:  Tolerated well, no immediate complications  Comments:      Some superficial cerumen removed, though unable to visualize TM's due to residual cerumen. Will give Debrox drops and return for cerumen disimpaction      Return in about 1 week (around 6/24/2024) for Procedure ear wax removal.     Subjective:   HPI  Iona Toribio is a 63 y.o. female who presents with decreased hearing. States she usually needs her ears cleaned when this happens. She also feels congested.      Review of Systems   Constitutional:  Negative for chills and fever.   HENT:  Positive for congestion and hearing loss.    Respiratory:  Negative for cough and shortness of breath.    Cardiovascular:  Negative for chest pain.   Gastrointestinal:  Negative for abdominal pain.   Neurological:  Negative for dizziness.        Objective:     /82 (BP Location: Left arm, Patient Position: Sitting, Cuff Size: Adult)   Pulse 66   Temp 97.9 °F (36.6 °C) (Tympanic)   Ht 4' 10\" (1.473 m)   Wt 52.6 kg (116 lb)   SpO2 100%   BMI 24.24 kg/m²      Physical Exam  Vitals reviewed.   Constitutional:       Appearance: Normal appearance.   HENT:      Head: Normocephalic.      Right Ear: There is impacted cerumen.      Left Ear: There is impacted " cerumen.      Nose: No rhinorrhea.      Mouth/Throat:      Mouth: Mucous membranes are moist.      Pharynx: No oropharyngeal exudate.   Cardiovascular:      Rate and Rhythm: Normal rate.      Pulses: Normal pulses.   Pulmonary:      Effort: No respiratory distress.      Breath sounds: Normal breath sounds. No wheezing.   Abdominal:      General: There is no distension.      Palpations: Abdomen is soft.   Neurological:      Mental Status: She is alert.   Psychiatric:         Mood and Affect: Mood normal.          ** Please Note: This note has been constructed using a voice recognition system **     Jona Pennington MD  06/17/24  5:13 PM

## 2024-06-17 NOTE — ASSESSMENT & PLAN NOTE
Home meds Flonase and Zyrtec  - Advised cerumen impaction may be playing a role  - No copious discharge, fever/chills  - Continue Flonase, Zyrtec (taking Loratidine generic)  - Reassess after cerumen disimpaction

## 2024-06-24 ENCOUNTER — OFFICE VISIT (OUTPATIENT)
Dept: GASTROENTEROLOGY | Facility: CLINIC | Age: 64
End: 2024-06-24
Payer: COMMERCIAL

## 2024-06-24 VITALS
BODY MASS INDEX: 24.43 KG/M2 | DIASTOLIC BLOOD PRESSURE: 82 MMHG | SYSTOLIC BLOOD PRESSURE: 117 MMHG | WEIGHT: 116.4 LBS | HEIGHT: 58 IN | HEART RATE: 75 BPM

## 2024-06-24 DIAGNOSIS — K31.89 GASTRIC NODULE: Primary | ICD-10-CM

## 2024-06-24 DIAGNOSIS — K29.40 AUTOIMMUNE GASTRITIS: ICD-10-CM

## 2024-06-24 PROCEDURE — 99204 OFFICE O/P NEW MOD 45 MIN: CPT | Performed by: PHYSICIAN ASSISTANT

## 2024-06-24 NOTE — ASSESSMENT & PLAN NOTE
Suspected AMAG based on gastric biopsy results, appears asymptomatic on PPI therapy    -Advised patient about biopsy results, will likely benefit from regular surveillance, EGD every 1 to 2 years    -Also check antiintrinsic factor antibody level and antiparietal cell antibody level, assess for possibility of pernicious anemia.  Hemoglobin appears stable on most recent CBC without significant macrocytosis

## 2024-06-24 NOTE — ASSESSMENT & PLAN NOTE
Gastric nodule originating in muscularis, patient had been recommended for endoscopic submucosal dissection at the time of recent EUS    -EUS findings discussed in detail with the patient at this time and questions were answered    -Will set patient up for EUS with ESD, ideally in [facility] with Dr. Moore, spoke with Dr. Moore, this could also be done at tertiary care facility such as [place] if cannot be coordinated in timely fashion on our side    -Discussed with patient about risks and benefits of the procedure at this time

## 2024-06-24 NOTE — PROGRESS NOTES
Follow-up Note -  [facility]  Iona Toribio 1960 63 y.o. female         ASSESSMENT & PLAN:    Gastric nodule  Gastric nodule originating in muscularis, patient had been recommended for endoscopic submucosal dissection at the time of recent EUS    -EUS findings discussed in detail with the patient at this time and questions were answered    -Will set patient up for EUS with ESD, ideally in [facility] with Dr. Moore, spoke with Dr. Moore, this could also be done at tertiary care facility such as [place] if cannot be coordinated in timely fashion on our side    -Discussed with patient about risks and benefits of the procedure at this time    Autoimmune gastritis  Suspected AMAG based on gastric biopsy results, appears asymptomatic on PPI therapy    -Advised patient about biopsy results, will likely benefit from regular surveillance, EGD every 1 to 2 years    -Also check antiintrinsic factor antibody level and antiparietal cell antibody level, assess for possibility of pernicious anemia.  Hemoglobin appears stable on most recent CBC without significant macrocytosis      Reason: Follow-up; question for autoimmune gastritis, gastric nodule    HPI: 63-year-old female with past history of substance abuse, patient reports 5 to 6 years sober from alcohol at this point who presents for follow-up.  We had performed EGD and colonoscopy in December 2023 regarding anemia and at the time noted 20 mm submucosal nodule epigastric incision, biopsies of the stomach also showed findings consistent with AMAG.  She underwent EUS with Dr. Maradiaga earlier this month on 6/6/2024 and lesion was found to originate in the muscularis; no deep tissue biopsies were taken and she was recommended to be set up for EUS with ESD, gastric biopsies again appeared consistent with AMAG and she was ordered for lab work including intrinsic factor antibody and antiparietal cell antibody which it appears she has not had  done yet.    At this time the patient says she is feeling relatively well, reports relatively regular bowel movements and stable weight.  She is taking Protonix daily and is not having any significant acid reflux or heartburn at this time.  No nausea or vomiting.    REVIEW OF SYSTEMS:      CONSTITUTIONAL: Denies any fever, chills, or rigors. Good appetite, and no recent weight loss.  HEENT: No earache or tinnitus. Denies hearing loss or visual disturbances.  CARDIOVASCULAR: No chest pain or palpitations.   RESPIRATORY: Denies any cough, hemoptysis, shortness of breath or dyspnea on exertion.  GASTROINTESTINAL: As noted in the History of Present Illness.   GENITOURINARY: No problems with urination. Denies any hematuria or dysuria.  NEUROLOGIC: No dizziness or vertigo, denies headaches.   MUSCULOSKELETAL: Denies any muscle or joint pain.   SKIN: Denies skin rashes or itching.   ENDOCRINE: Denies excessive thirst. Denies intolerance to heat or cold.  PSYCHOSOCIAL: Denies depression or anxiety. Denies any recent memory loss.     Past Medical History:   Diagnosis Date    Anxiety     Depression     Disease of thyroid gland     Elevated troponin 5/31/2023    History of alcohol abuse 5/31/2023    Substance abuse (HCC)       Past Surgical History:   Procedure Laterality Date    BACK SURGERY  01/01/1973    scolosis rods    CARDIAC CATHETERIZATION N/A 07/25/2023    Procedure: Cardiac catheterization;  Surgeon: Lizet Ibarra MD;  Location: [facility];  Service: Cardiology    COLONOSCOPY      ELBOW SURGERY  05/01/2018    UPPER GASTROINTESTINAL ENDOSCOPY      WISDOM TOOTH EXTRACTION       Social History     Socioeconomic History    Marital status:      Spouse name: Not on file    Number of children: Not on file    Years of education: Not on file    Highest education level: Not on file   Occupational History    Not on file   Tobacco Use    Smoking status: Some Days     Current packs/day: 1.50     Average  "packs/day: 1.5 packs/day for 30.0 years (45.0 ttl pk-yrs)     Types: Cigarettes    Smokeless tobacco: Never    Tobacco comments:     Currently smokes 1 ppd 9/5/2023   Vaping Use    Vaping status: Some Days    Substances: Nicotine   Substance and Sexual Activity    Alcohol use: Not Currently     Comment: none 7/2018-went to rehab    Drug use: Not Currently     Comment: \"Could have been anything\" p/ pt 06/24/24    Sexual activity: Not on file   Other Topics Concern    Not on file   Social History Narrative    Not on file     Social Determinants of Health     Financial Resource Strain: Low Risk  (10/30/2023)    Overall Financial Resource Strain (CARDIA)     Difficulty of Paying Living Expenses: Not very hard   Food Insecurity: No Food Insecurity (10/30/2023)    Hunger Vital Sign     Worried About Running Out of Food in the Last Year: Never true     Ran Out of Food in the Last Year: Never true   Transportation Needs: No Transportation Needs (10/30/2023)    PRAPARE - Transportation     Lack of Transportation (Medical): No     Lack of Transportation (Non-Medical): No   Physical Activity: Not on file   Stress: No Stress Concern Present (10/30/2023)    [employer] [employer] - Occupational Stress Questionnaire     Feeling of Stress : Not at all   Social Connections: Not on file   Intimate Partner Violence: Not on file   Housing Stability: Low Risk  (7/24/2023)    Housing Stability Vital Sign     Unable to Pay for Housing in the Last Year: No     Number of Times Moved in the Last Year: 1     Homeless in the Last Year: No     Family History   Problem Relation Age of Onset    Colon cancer Mother     Breast cancer Mother     Cancer Mother     Leukemia Father      Other  Current Outpatient Medications   Medication Sig Dispense Refill    Advair Diskus 250-50 MCG/ACT inhaler INHALE 1 PUFF 2 (TWO) TIMES A DAY RINSE MOUTH AFTER USE. 60 blister 1    aspirin (ECOTRIN LOW STRENGTH) 81 mg EC tablet Take 81 mg by mouth " daily      atorvastatin (LIPITOR) 40 mg tablet Take 1 tablet (40 mg total) by mouth daily 90 tablet 3    Biotin 81146 MCG TABS Take by mouth      buPROPion (WELLBUTRIN SR) 150 mg 12 hr tablet TAKE 1 TABLET TWO TIMES A DAY 60 tablet 2    carbamide peroxide (DEBROX) 6.5 % otic solution Administer 5 drops into both ears 2 (two) times a day 15 mL 0    cetirizine (ZyrTEC) 10 MG chewable tablet Chew 1 tablet (10 mg total) daily 90 tablet 2    docusate sodium (COLACE) 100 mg capsule Take 1 capsule (100 mg total) by mouth daily Pt verbalized she takes this once daily      ferrous sulfate 324 (65 Fe) mg Take 1 tablet (324 mg total) by mouth daily before breakfast 30 tablet 0    fluticasone (FLONASE) 50 mcg/act nasal spray 1 SPRAY INTO EACH NOSTRIL DAILY 16 g 3    gabapentin (NEURONTIN) 400 mg capsule Take 400 mg by mouth 3 (three) times a day      hydrOXYzine HCL (ATARAX) 25 mg tablet Take 25 mg by mouth 3 (three) times a day as needed      Incruse Ellipta 62.5 MCG/ACT AEPB inhaler INHALE 1 PUFF DAILY 30 each 3    levalbuterol (XOPENEX) 1.25 mg/0.5 mL nebulizer solution Take 0.5 mL (1.25 mg total) by nebulization every 8 (eight) hours as needed for wheezing 180 mL 5    levothyroxine 112 mcg tablet TAKE 1 TABLET BY MOUTH DAILY 60 tablet 0    metoprolol tartrate (LOPRESSOR) 25 mg tablet TAKE HALF TABLET EVERY 12 HOURS 60 tablet 1    multivitamin (THERAGRAN) TABS Take 1 tablet by mouth daily      nicotine (NICODERM CQ) 14 mg/24hr TD 24 hr patch Place 1 patch on the skin over 24 hours every 24 hours 28 patch 0    nicotine (NICODERM CQ) 7 mg/24hr TD 24 hr patch Place 1 patch on the skin over 24 hours every 24 hours 28 patch 0    pantoprazole (PROTONIX) 40 mg tablet TAKE 1 TABLET BY MOUTH DAILY 90 tablet 1    polyethylene glycol (MIRALAX) 17 g packet Take 17 g by mouth daily as needed (constipation) 20 each 0    QUEtiapine (SEROquel) 25 mg tablet Take 25 mg by mouth daily at bedtime as needed (for sleep)      Ventolin  (90  "Base) MCG/ACT inhaler INHALE 2 PUFFS EVERY 6 HOURS AS NEEDED FOR WHEEZING 18 g 1    vitamin B-12 (VITAMIN B-12) 1,000 mcg tablet Take 1 tablet (1,000 mcg total) by mouth daily 30 tablet 0     No current facility-administered medications for this visit.       Blood pressure 117/82, pulse 75, height 4' 10\" (1.473 m), weight 52.8 kg (116 lb 6.4 oz), not currently breastfeeding.    PHYSICAL EXAM:      General Appearance:   Alert, cooperative, no distress, appears stated age    HEENT:   Normocephalic, atraumatic, anicteric.     Neck:  Supple, symmetrical, trachea midline, no adenopathy;    thyroid: no enlargement/tenderness/nodules; no carotid  bruit or JVD    Lungs:   Clear to auscultation bilaterally; no rales, rhonchi or wheezing; respirations unlabored    Heart::   S1 and S2 normal; regular rate and rhythm; no murmur, rub, or gallop.   Abdomen:   Soft, non-tender, non-distended; normal bowel sounds; no masses, no organomegaly    Extremities: No edema, erythema, wounds, rashes   Rectal:   Deferred                      Lab Results   Component Value Date    WBC 12.64 (H) 07/27/2023    HGB 11.7 07/27/2023    HCT 36.3 07/27/2023    MCV 92 07/27/2023     07/27/2023     Lab Results   Component Value Date    CALCIUM 9.2 07/27/2023    K 3.6 07/27/2023    CO2 30 07/27/2023    CL 99 07/27/2023    BUN 15 07/27/2023    CREATININE 0.50 (L) 07/27/2023     Lab Results   Component Value Date    ALT 8 07/27/2023    AST 8 (L) 07/27/2023    ALKPHOS 45 07/27/2023     Lab Results   Component Value Date    INR 1.01 07/25/2023    INR 1.06 07/23/2023    INR 0.92 05/31/2023    PROTIME 13.4 07/25/2023    PROTIME 13.9 07/23/2023    PROTIME 12.5 05/31/2023       Endoscopic ultrasonography, GI (Upper)    Result Date: 6/3/2024  Impression: Hypoechoic and oval mass measuring 16 mm x 12 mm with well-defined margins was visualized in the incisura and lesser curve of the stomach, originating in the muscularis mucosa. Mild abnormal/ atrophic " mucosa; performed cold forceps biopsies. Antral gastritis - biopsies done. Duodenal mucosal irregularity - biopsies done. RECOMMENDATION: Await pathology results Plan to do endoscopic submucosal dissection. Will inform patient of scheduling.   Sarabjit Maradiaga MD

## 2024-07-17 DIAGNOSIS — J44.9 CHRONIC OBSTRUCTIVE PULMONARY DISEASE, UNSPECIFIED COPD TYPE (HCC): ICD-10-CM

## 2024-07-17 RX ORDER — UMECLIDINIUM 62.5 UG/1
1 AEROSOL, POWDER ORAL DAILY
Qty: 30 EACH | Refills: 3 | Status: SHIPPED | OUTPATIENT
Start: 2024-07-17

## 2024-07-17 NOTE — TELEPHONE ENCOUNTER
Vm on rx line:    Morning my name is Iona Toribio. My YOB: 1960. I need a refill for the incruse Ellipta. I think the doctor has to OK it Doctor son. It goes to [facility] for delivery. My number is [phone]. Thank you. Bye bye.    You received a voice mail from IONA TORIBIO.

## 2024-07-19 DIAGNOSIS — E03.9 HYPOTHYROID: ICD-10-CM

## 2024-07-19 RX ORDER — LEVOTHYROXINE SODIUM 112 UG/1
112 TABLET ORAL DAILY
Qty: 30 TABLET | Refills: 0 | Status: SHIPPED | OUTPATIENT
Start: 2024-07-19

## 2024-07-19 NOTE — TELEPHONE ENCOUNTER
Hi, my name is Iona Toribio. My YOB: 1960. I need a refill on level levothyroxine Synthroid 112 MCG. My pharmacy is [facility]. Their number is [phone] and my telephone number, if there's a problem is [phone]. Thank you. Juan David seaman

## 2024-07-26 ENCOUNTER — TELEPHONE (OUTPATIENT)
Dept: GASTROENTEROLOGY | Facility: CLINIC | Age: 64
End: 2024-07-26

## 2024-08-21 ENCOUNTER — LAB (OUTPATIENT)
Dept: LAB | Facility: CLINIC | Age: 64
End: 2024-08-21
Payer: COMMERCIAL

## 2024-08-21 ENCOUNTER — TRANSCRIBE ORDERS (OUTPATIENT)
Dept: LAB | Facility: CLINIC | Age: 64
End: 2024-08-21

## 2024-08-21 DIAGNOSIS — E03.9 HYPOTHYROIDISM, UNSPECIFIED TYPE: Primary | ICD-10-CM

## 2024-08-21 DIAGNOSIS — K29.40 AUTOIMMUNE GASTRITIS: ICD-10-CM

## 2024-08-21 DIAGNOSIS — E03.9 HYPOTHYROID: ICD-10-CM

## 2024-08-21 DIAGNOSIS — E03.9 ACQUIRED HYPOTHYROIDISM: ICD-10-CM

## 2024-08-21 DIAGNOSIS — Z11.59 NEED FOR HEPATITIS C SCREENING TEST: ICD-10-CM

## 2024-08-21 DIAGNOSIS — K31.83 ACHLORHYDRIA: ICD-10-CM

## 2024-08-21 PROCEDURE — 86340 INTRINSIC FACTOR ANTIBODY: CPT

## 2024-08-21 PROCEDURE — 82941 ASSAY OF GASTRIN: CPT

## 2024-08-21 PROCEDURE — 84443 ASSAY THYROID STIM HORMONE: CPT

## 2024-08-21 PROCEDURE — 83516 IMMUNOASSAY NONANTIBODY: CPT

## 2024-08-21 PROCEDURE — 84439 ASSAY OF FREE THYROXINE: CPT

## 2024-08-21 PROCEDURE — 86803 HEPATITIS C AB TEST: CPT

## 2024-08-21 PROCEDURE — 36415 COLL VENOUS BLD VENIPUNCTURE: CPT

## 2024-08-21 PROCEDURE — 82607 VITAMIN B-12: CPT

## 2024-08-22 ENCOUNTER — TELEPHONE (OUTPATIENT)
Age: 64
End: 2024-08-22

## 2024-08-22 DIAGNOSIS — Z71.6 ENCOUNTER FOR TOBACCO USE CESSATION COUNSELING: ICD-10-CM

## 2024-08-22 DIAGNOSIS — E03.9 ACQUIRED HYPOTHYROIDISM: Primary | ICD-10-CM

## 2024-08-22 LAB
HCV AB SER QL: NORMAL
T4 FREE SERPL-MCNC: 1.54 NG/DL (ref 0.61–1.12)
TSH SERPL DL<=0.05 MIU/L-ACNC: 0.29 UIU/ML (ref 0.45–4.5)
VIT B12 SERPL-MCNC: 916 PG/ML (ref 180–914)

## 2024-08-22 RX ORDER — LEVOTHYROXINE SODIUM 100 UG/1
100 TABLET ORAL
Qty: 100 TABLET | Refills: 0 | Status: SHIPPED | OUTPATIENT
Start: 2024-08-22

## 2024-08-22 RX ORDER — BUPROPION HYDROCHLORIDE 150 MG/1
TABLET, EXTENDED RELEASE ORAL
Qty: 60 TABLET | Refills: 2 | Status: SHIPPED | OUTPATIENT
Start: 2024-08-22

## 2024-08-22 NOTE — TELEPHONE ENCOUNTER
Pt was called to notify that her Hepatitis C screening was negative (normal). However, I could not reach the Pt on the phone after trying multiple times. VM was left giving the result and Pt was advised to call back if she has any questions.

## 2024-08-22 NOTE — TELEPHONE ENCOUNTER
Pt was called to notify that her TSH is low and T4 is high which means her levothyroxine 112 mcg dosage is too high. Will lower levothyroxine to 100 mcg and recheck TSH and T4 in 6-8 weeks. Pt acknowledged her understanding and answered all her questions.

## 2024-08-22 NOTE — RESULT ENCOUNTER NOTE
Inform patient via [employer].  Please review the pathology/lab result of further discussion.    Copied from [employer] message :       Rosalinda Monson,     Your vitamin B12 was normal.   The thyroid level was high too - follow up with your primary physician.     Best regards,     Sarabjit Maradiaga MD

## 2024-08-23 LAB — PCA AB SER-ACNC: 99.1 UNITS (ref 0–20)

## 2024-08-24 LAB — IF BLOCK AB SER QL RIA: 1 AU/ML (ref 0–1.1)

## 2024-08-26 LAB — GASTRIN SERPL-MCNC: 59 PG/ML (ref 0–115)

## 2024-08-27 ENCOUNTER — TELEPHONE (OUTPATIENT)
Age: 64
End: 2024-08-27

## 2024-08-27 NOTE — TELEPHONE ENCOUNTER
----- Message from Alice Handley MD sent at 8/22/2024  7:45 AM EDT -----  Please make medicare [facility] appointment for this patient. Thank you.

## 2024-08-30 NOTE — RESULT ENCOUNTER NOTE
Inform patient via [employer].  Please review the pathology/lab result of further discussion.    Copied from [employer] message :       Roslainda Monson,     Your gastrin levels were normal.    Best regards,     Sarabjit Maradiaga MD

## 2024-09-04 ENCOUNTER — OFFICE VISIT (OUTPATIENT)
Dept: PULMONOLOGY | Facility: MEDICAL CENTER | Age: 64
End: 2024-09-04
Payer: COMMERCIAL

## 2024-09-04 VITALS
HEIGHT: 58 IN | TEMPERATURE: 97.1 F | HEART RATE: 66 BPM | DIASTOLIC BLOOD PRESSURE: 84 MMHG | SYSTOLIC BLOOD PRESSURE: 138 MMHG | OXYGEN SATURATION: 91 % | BODY MASS INDEX: 24.86 KG/M2 | RESPIRATION RATE: 12 BRPM | WEIGHT: 118.4 LBS

## 2024-09-04 DIAGNOSIS — J44.9 CHRONIC OBSTRUCTIVE PULMONARY DISEASE, UNSPECIFIED COPD TYPE (HCC): ICD-10-CM

## 2024-09-04 DIAGNOSIS — J96.11 CHRONIC RESPIRATORY FAILURE WITH HYPOXIA (HCC): ICD-10-CM

## 2024-09-04 PROCEDURE — 99214 OFFICE O/P EST MOD 30 MIN: CPT | Performed by: STUDENT IN AN ORGANIZED HEALTH CARE EDUCATION/TRAINING PROGRAM

## 2024-09-04 PROCEDURE — 99406 BEHAV CHNG SMOKING 3-10 MIN: CPT | Performed by: STUDENT IN AN ORGANIZED HEALTH CARE EDUCATION/TRAINING PROGRAM

## 2024-09-04 RX ORDER — LEVALBUTEROL INHALATION SOLUTION 1.25 MG/3ML
SOLUTION RESPIRATORY (INHALATION)
COMMUNITY
Start: 2024-07-19

## 2024-09-04 RX ORDER — FLUTICASONE FUROATE, UMECLIDINIUM BROMIDE AND VILANTEROL TRIFENATATE 100; 62.5; 25 UG/1; UG/1; UG/1
1 POWDER RESPIRATORY (INHALATION) DAILY
Qty: 180 BLISTER | Refills: 11 | Status: SHIPPED | OUTPATIENT
Start: 2024-09-04 | End: 2024-09-11 | Stop reason: SDUPTHER

## 2024-09-04 NOTE — PROGRESS NOTES
Consultation - Pulmonary Medicine   Iona Toribio 64 y.o. female MRN: [id number]    Reason for Consult: COPD    Iona Toribio is a 64 y.o. female with a PMH of COPD, HTN, SVT, Scoliosis who presents  for follow up.         COPD - Severe - Minimal exacerbations. Eos 480 - Overall symptoms have improved with decreased tobacco use and adherence to inhaler therapy. I will transition her to once daily Trelegy due to insurance and to increase adherence.   - Transition to Trelegy  - Levalbuterol/Ventolin PRN and prior to exercise    Tobacco Use disorder - Down to 3-4 cigarettes per day, patient will continue to try to decrease use  - Tobacco cessation counseling   - Lung Screening - Patient missed most recent CT, she will obtain it this month    Chronic Hypoxic Respiratory - Desaturated 87% with ambulation -    - Continue  Oxygen replacement with ambulation 2L    Villa Beltran MD  [facility]  ______________________________________________________________________  Interval Hx 09/04/24:   Quit smoking at work, 3-4 per night  No recent steroids/ or exacerbations   Unable to do rehab due to work  Breathing overall improved  Use oxygen at night   Uses inhalers more with humidity       Interval Hx: 12/27  Symptoms unchanged  Dyspnea with carrying groceries  Quit smoking from 9-5  - 1/2 PPD   Recent Colonoscopy/EGD - Stomach nodule    HPI:    Iona Toribio is a 64 y.o. female who presents follow up  Hospitalize x2 SVT and hypotension s/p Cath mild occlusion completed steroid burst  Tobacco 40-50pack year history - current 1 ppd smoker  Exercise tolerance not limited by breathing - back pain  Eos 400   Allergies Seasonal   No Asthma Hx   No previous exacerbations        PFT results:  The most recent pulmonary function tests were reviewed.  9/2023  FEV1/FVC Ratio: 60 %  Forced Vital Capacity: 1.21 L    48 % predicted  FEV1: 0.72 L     35 % predicted     After administration of bronchodilator   FEV1/FVC  Ratio: 59%  FVC: 1.30 L, 51% predicted, 7% change  FEV1: 0.77 L, 38% predicted, 6% change     Lung volumes by body plethysmography:   Total Lung Capacity 115 % predicted   Residual volume 210 % predicted     DLCO corrected for patients hemoglobin level: 42 %    Imaging:  I personally reviewed the images on the PAC system pertinent to today's visit  CT Chest 7/26/23  Atelectasis, scarring in the posterior right lower lobe with some bronchial branch narrowing and occlusion and trace right effusion. Superimposed infection to be excluded clinically Mucous debris in the distal posterior trachea tracking into the bronchi bilaterally.      Review of Systems:  Aside from what is mentioned in the HPI, the review of systems otherwise negative.    Current Medications:    Current Outpatient Medications:     Advair Diskus 250-50 MCG/ACT inhaler, INHALE 1 PUFF 2 (TWO) TIMES A DAY RINSE MOUTH AFTER USE., Disp: 60 blister, Rfl: 1    atorvastatin (LIPITOR) 40 mg tablet, Take 1 tablet (40 mg total) by mouth daily, Disp: 90 tablet, Rfl: 3    Biotin 82158 MCG TABS, Take by mouth, Disp: , Rfl:     buPROPion (WELLBUTRIN SR) 150 mg 12 hr tablet, TAKE 1 TABLET TWO TIMES A DAY, Disp: 60 tablet, Rfl: 2    carbamide peroxide (DEBROX) 6.5 % otic solution, Administer 5 drops into both ears 2 (two) times a day, Disp: 15 mL, Rfl: 0    cetirizine (ZyrTEC) 10 MG chewable tablet, Chew 1 tablet (10 mg total) daily, Disp: 90 tablet, Rfl: 2    docusate sodium (COLACE) 100 mg capsule, Take 1 capsule (100 mg total) by mouth daily Pt verbalized she takes this once daily, Disp: , Rfl:     ferrous sulfate 324 (65 Fe) mg, Take 1 tablet (324 mg total) by mouth daily before breakfast, Disp: 30 tablet, Rfl: 0    fluticasone (FLONASE) 50 mcg/act nasal spray, 1 SPRAY INTO EACH NOSTRIL DAILY, Disp: 16 g, Rfl: 3    gabapentin (NEURONTIN) 400 mg capsule, Take 400 mg by mouth 3 (three) times a day, Disp: , Rfl:     hydrOXYzine HCL (ATARAX) 25 mg tablet, Take 25 mg by  mouth 3 (three) times a day as needed, Disp: , Rfl:     levalbuterol (XOPENEX) 1.25 mg/0.5 mL nebulizer solution, Take 0.5 mL (1.25 mg total) by nebulization every 8 (eight) hours as needed for wheezing, Disp: 180 mL, Rfl: 5    levalbuterol (XOPENEX) 1.25 mg/3 mL nebulizer solution, , Disp: , Rfl:     levothyroxine 100 mcg tablet, Take 1 tablet (100 mcg total) by mouth daily in the early morning, Disp: 100 tablet, Rfl: 0    metoprolol tartrate (LOPRESSOR) 25 mg tablet, TAKE HALF TABLET EVERY 12 HOURS, Disp: 60 tablet, Rfl: 1    multivitamin (THERAGRAN) TABS, Take 1 tablet by mouth daily, Disp: , Rfl:     nicotine (NICODERM CQ) 14 mg/24hr TD 24 hr patch, Place 1 patch on the skin over 24 hours every 24 hours, Disp: 28 patch, Rfl: 0    nicotine (NICODERM CQ) 7 mg/24hr TD 24 hr patch, Place 1 patch on the skin over 24 hours every 24 hours, Disp: 28 patch, Rfl: 0    pantoprazole (PROTONIX) 40 mg tablet, TAKE 1 TABLET BY MOUTH DAILY, Disp: 90 tablet, Rfl: 1    polyethylene glycol (MIRALAX) 17 g packet, Take 17 g by mouth daily as needed (constipation), Disp: 20 each, Rfl: 0    QUEtiapine (SEROquel) 25 mg tablet, Take 25 mg by mouth daily at bedtime as needed (for sleep), Disp: , Rfl:     umeclidinium (Incruse Ellipta) 62.5 mcg/actuation AEPB inhaler, Inhale 1 puff daily, Disp: 30 each, Rfl: 3    Ventolin  (90 Base) MCG/ACT inhaler, INHALE 2 PUFFS EVERY 6 HOURS AS NEEDED FOR WHEEZING, Disp: 18 g, Rfl: 1    vitamin B-12 (VITAMIN B-12) 1,000 mcg tablet, Take 1 tablet (1,000 mcg total) by mouth daily, Disp: 30 tablet, Rfl: 0    aspirin (ECOTRIN LOW STRENGTH) 81 mg EC tablet, Take 81 mg by mouth daily (Patient not taking: Reported on 9/4/2024), Disp: , Rfl:     Historical Information   Past Medical History:   Diagnosis Date    Anxiety     Depression     Disease of thyroid gland     Elevated troponin 5/31/2023    History of alcohol abuse 5/31/2023    Substance abuse (HCC)      Past Surgical History:   Procedure  "Laterality Date    BACK SURGERY  01/01/1973    scolosis rods    CARDIAC CATHETERIZATION N/A 07/25/2023    Procedure: Cardiac catheterization;  Surgeon: Lizet Ibarra MD;  Location: [facility];  Service: Cardiology    COLONOSCOPY      ELBOW SURGERY  05/01/2018    UPPER GASTROINTESTINAL ENDOSCOPY      WISDOM TOOTH EXTRACTION       Social History   Social History     Tobacco Use   Smoking Status Some Days    Current packs/day: 1.50    Average packs/day: 1.5 packs/day for 30.0 years (45.0 ttl pk-yrs)    Types: Cigarettes   Smokeless Tobacco Never   Tobacco Comments    Currently smokes 1 ppd 9/5/2023       Family History:   Family History   Problem Relation Age of Onset    Colon cancer Mother     Breast cancer Mother     Cancer Mother     Leukemia Father          PhysicalExamination:  Vitals:   /84 (BP Location: Left arm, Patient Position: Sitting, Cuff Size: Extra-Large)   Pulse 66   Temp (!) 97.1 °F (36.2 °C) (Temporal)   Resp 12   Ht 4' 10\" (1.473 m)   Wt 53.7 kg (118 lb 6.4 oz)   SpO2 91%   BMI 24.75 kg/m²   Physical Exam: Unchanged  Appearance -- NAD, speaking full sentences  HEENT -- anicteric sclera, clear OP, MMM  Neck -- no JVD  Lungs -- CTAB  Abdomen -- soft, NTND, +bs  Extremities -- WWP, no LE edema  Skin -- no rash  Neuro -- A&Ox3, wnl  Psych -- no obvious depression or hallucination        Diagnostic Data:  Labs:  I personally reviewed the most recent laboratory data pertinent to today's visit    Lab Results   Component Value Date    WBC 12.64 (H) 07/27/2023    HGB 11.7 07/27/2023    HCT 36.3 07/27/2023    MCV 92 07/27/2023     07/27/2023     Lab Results   Component Value Date    CALCIUM 9.2 07/27/2023    K 3.6 07/27/2023    CO2 30 07/27/2023    CL 99 07/27/2023    BUN 15 07/27/2023    CREATININE 0.50 (L) 07/27/2023     No results found for: \"IGE\"  Lab Results   Component Value Date    ALT 8 07/27/2023    AST 8 (L) 07/27/2023    ALKPHOS 45 07/27/2023             Immunization " History   Administered Date(s) Administered    COVID-19 MODERNA VACC 0.5 ML IM 04/27/2021    Influenza, injectable, quadrivalent, preservative free 0.5 mL 10/21/2020, 10/16/2023    Pneumococcal Conjugate Vaccine 20-valent (Pcv20), Polysace 06/01/2023    Pneumococcal Polysaccharide PPV23 01/09/2020    Td (adult), adsorbed 05/28/2018      Tobacco Cessation  Tobacco Cessation Counselling    Advised to quit and impact of smoking  Assessed willingness to attempt to quit  Provided methods and skills for cessation  Discussed Medication management of smoking session drugs   Resources and/or medications provided  Patient set quit date  Follow-up arranged  Amount of time spent counseling patient 5 minutes      I discussed with her that she is a candidate for lung cancer CT screening.     The following Shared Decision-Making points were covered:  Benefits of screening were discussed, including the rates of reduction in death from lung cancer and other causes.  Harms of screening were reviewed, including false positive tests, radiation exposure levels, risks of invasive procedures, risks of complications of screening, and risk of overdiagnosis.  I counseled on the importance of adherence to annual lung cancer LDCT screening, impact of co-morbidities, and ability or willingness to undergo diagnosis and treatment.  I counseled on the importance of maintaining abstinence as a former smoker or was counseled on the importance of smoking cessation if a current smoker    Review of Eligibility Criteria: She meets all of the criteria for Lung Cancer Screening.   She is 64 y.o.   She has 20 pack year tobacco history and is a current smoker or has quit within the past 15 years  She presents no signs or symptoms of lung cancer    After discussion, the patient decided to elect lung cancer screening.      I have spent a total time of 20 minutes on 09/04/24 in caring for this patient including Diagnostic results, Prognosis, Risks and benefits  of tx options, Instructions for management, Patient and family education, Importance of tx compliance, Risk factor reductions, Impressions, Counseling / Coordination of care, Documenting in the medical record, Reviewing / ordering tests, medicine, procedures  , Obtaining or reviewing history  , and Communicating with other healthcare professionals .

## 2024-09-10 ENCOUNTER — TELEPHONE (OUTPATIENT)
Age: 64
End: 2024-09-10

## 2024-09-10 DIAGNOSIS — J44.9 CHRONIC OBSTRUCTIVE PULMONARY DISEASE, UNSPECIFIED COPD TYPE (HCC): ICD-10-CM

## 2024-09-10 NOTE — TELEPHONE ENCOUNTER
Pt calling as she would like her Trelegy script that was sent to [facility] to be cancelled and sent to the [facility] on [address]

## 2024-09-11 RX ORDER — FLUTICASONE FUROATE, UMECLIDINIUM BROMIDE AND VILANTEROL TRIFENATATE 100; 62.5; 25 UG/1; UG/1; UG/1
1 POWDER RESPIRATORY (INHALATION) DAILY
Qty: 180 BLISTER | Refills: 9 | Status: SHIPPED | OUTPATIENT
Start: 2024-09-11

## 2024-09-16 ENCOUNTER — HOSPITAL ENCOUNTER (OUTPATIENT)
Dept: RADIOLOGY | Facility: HOSPITAL | Age: 64
Discharge: HOME | End: 2024-09-16
Attending: STUDENT IN AN ORGANIZED HEALTH CARE EDUCATION/TRAINING PROGRAM
Payer: COMMERCIAL

## 2024-09-16 DIAGNOSIS — J43.1 PANLOBULAR EMPHYSEMA (HCC): ICD-10-CM

## 2024-09-16 DIAGNOSIS — Z72.0 TOBACCO ABUSE: ICD-10-CM

## 2024-09-16 DIAGNOSIS — Z87.891 PERSONAL HISTORY OF NICOTINE DEPENDENCE: ICD-10-CM

## 2024-09-16 PROCEDURE — 71271 CT THORAX LUNG CANCER SCR C-: CPT

## 2024-09-23 ENCOUNTER — OFFICE VISIT (OUTPATIENT)
Age: 64
End: 2024-09-23

## 2024-09-23 VITALS
WEIGHT: 121.1 LBS | HEIGHT: 59 IN | BODY MASS INDEX: 24.41 KG/M2 | DIASTOLIC BLOOD PRESSURE: 76 MMHG | SYSTOLIC BLOOD PRESSURE: 125 MMHG | RESPIRATION RATE: 20 BRPM | OXYGEN SATURATION: 97 % | TEMPERATURE: 97.5 F | HEART RATE: 69 BPM

## 2024-09-23 DIAGNOSIS — Z12.31 ENCOUNTER FOR SCREENING MAMMOGRAM FOR BREAST CANCER: ICD-10-CM

## 2024-09-23 DIAGNOSIS — E03.9 ACQUIRED HYPOTHYROIDISM: ICD-10-CM

## 2024-09-23 DIAGNOSIS — F17.200 SMOKING: ICD-10-CM

## 2024-09-23 DIAGNOSIS — H61.23 BILATERAL IMPACTED CERUMEN: ICD-10-CM

## 2024-09-23 DIAGNOSIS — Z11.4 SCREENING FOR HIV (HUMAN IMMUNODEFICIENCY VIRUS): ICD-10-CM

## 2024-09-23 DIAGNOSIS — M79.645 PAIN OF LEFT THUMB: ICD-10-CM

## 2024-09-23 DIAGNOSIS — E78.00 PURE HYPERCHOLESTEROLEMIA: ICD-10-CM

## 2024-09-23 DIAGNOSIS — Z23 NEED FOR COVID-19 VACCINE: ICD-10-CM

## 2024-09-23 DIAGNOSIS — Z00.00 MEDICARE ANNUAL WELLNESS VISIT, SUBSEQUENT: Primary | ICD-10-CM

## 2024-09-23 PROCEDURE — G0439 PPPS, SUBSEQ VISIT: HCPCS | Performed by: FAMILY MEDICINE

## 2024-09-23 PROCEDURE — 91320 SARSCV2 VAC 30MCG TRS-SUC IM: CPT | Performed by: FAMILY MEDICINE

## 2024-09-23 PROCEDURE — 90480 ADMN SARSCOV2 VAC 1/ONLY CMP: CPT | Performed by: FAMILY MEDICINE

## 2024-09-23 PROCEDURE — 99213 OFFICE O/P EST LOW 20 MIN: CPT | Performed by: FAMILY MEDICINE

## 2024-09-23 NOTE — ASSESSMENT & PLAN NOTE
We reduced levothyroxine to 100 mcg from 112 mcg since Pt's T4 was high about 2 months ago.    We will repeat TSH and T4 to see if 100 mcg is optimal dosage.    Orders:    TSH + Free T4; Future    TSH + Free T4

## 2024-09-23 NOTE — PROGRESS NOTES
Ambulatory Visit  Name: Iona Toribio      : 1960      MRN: [id number]  Encounter Provider: Alice Handley MD  Encounter Date: 2024   Encounter department: [facility]    Assessment & Plan  Medicare annual wellness visit, subsequent         Acquired hypothyroidism  We reduced levothyroxine to 100 mcg from 112 mcg since Pt's T4 was high about 2 months ago.    We will repeat TSH and T4 to see if 100 mcg is optimal dosage.    Orders:    TSH + Free T4; Future    TSH + Free T4    Screening for HIV (human immunodeficiency virus)    Orders:    HIV 1/2 AG/AB w Reflex [facility] for 2 yr old and above; Future    Encounter for screening mammogram for breast cancer    Orders:    Mammo screening bilateral w 3d and cad; Future    Pure hypercholesterolemia    Orders:    Lipid panel; Future    Lipid panel    Pain of left thumb  Pt has locking thumb on left hand and clicks and painful when bending.     Will get left hand Xray and send to sports medicine after getting result from xray.   Orders:    XR hand 3+ vw left; Future    Need for COVID-19 vaccine    Orders:    COVID-19 Pfizer mRNA vaccine 12 yr and older (Comirnaty pre-filled syringe)    Bilateral impacted cerumen  Pt has bilateral impacted cerumen and TM cannot be visualized    Pt was advised to use debrox for 1 week and come back in 1-2 weeks for ear irrigation.       Smoking  Extensive smoke cessation education given. Pt is not ready to quit. Last CT lung screening negative for nodules.    Pt follows pulmonology for her COPD.         Depression Screening and Follow-up Plan: Patient was screened for depression during today's encounter. They screened negative with a PHQ-9 score of 2.      Preventive health issues were discussed with patient, and age appropriate screening tests were ordered as noted in patient's After Visit Summary. Personalized health advice and appropriate referrals for health education or preventive services given  if needed, as noted in patient's After Visit Summary.    History of Present Illness     Pt is doing well. Pt is here for medicare AWV.  Patient Care Team:  Alice Handley MD as PCP - General  Juan Rowland MD as PCP - PCP-[facility] ([facility])  Juan Rowland MD as PCP - [facility]CP-[facility] ([facility])    Review of Systems   Constitutional:  Negative for chills and fever.   HENT:  Negative for ear pain and sore throat.    Eyes:  Negative for pain and visual disturbance.   Respiratory:  Negative for cough and shortness of breath.    Cardiovascular:  Negative for chest pain and palpitations.   Gastrointestinal:  Negative for abdominal pain, constipation, diarrhea, nausea and vomiting.   Genitourinary:  Negative for dysuria and hematuria.   Musculoskeletal:  Positive for arthralgias (left thumb pain). Negative for back pain, neck pain and neck stiffness.   Skin:  Negative for color change and rash.   Neurological:  Negative for dizziness, weakness and headaches.   Psychiatric/Behavioral:  Negative for agitation and confusion. The patient is not nervous/anxious.    All other systems reviewed and are negative.    Medical History Reviewed by provider this encounter:  Tobacco  Allergies  Meds  Problems  Med Hx  Surg Hx  Fam Hx       Annual Wellness Visit Questionnaire       Health Risk Assessment:   Patient rates overall health as fair. Patient feels that their physical health rating is slightly worse. Patient is satisfied with their life. Eyesight was rated as same. Hearing was rated as same. Patient feels that their emotional and mental health rating is same. Patients states they are never, rarely angry. Patient states they are sometimes unusually tired/fatigued. Pain experienced in the last 7 days has been a lot. Patient's pain rating has been 7/10. Patient states that she has experienced no weight loss or gain in last 6 months.     Depression Screening:   PHQ-9 Score: 2      Fall Risk Screening:   In the past year,  patient has experienced: no history of falling in past year      Urinary Incontinence Screening:   Patient has not leaked urine accidently in the last six months.     Home Safety:  Patient does not have trouble with stairs inside or outside of their home. Patient has working smoke alarms and has working carbon monoxide detector. Home safety hazards include: none.     Nutrition:   Current diet is Regular and Limited junk food.     Medications:   Patient is currently taking over-the-counter supplements. OTC medications include: see medication list. Patient is able to manage medications.     Activities of Daily Living (ADLs)/Instrumental Activities of Daily Living (IADLs):   Walk and transfer into and out of bed and chair?: Yes  Dress and groom yourself?: Yes    Bathe or shower yourself?: Yes    Feed yourself? Yes  Do your laundry/housekeeping?: Yes  Manage your money, pay your bills and track your expenses?: Yes  Make your own meals?: Yes    Do your own shopping?: Yes    Previous Hospitalizations:   Any hospitalizations or ED visits within the last 12 months?: No      Advance Care Planning:   Living will: No    Durable POA for healthcare: No    Advanced directive: No    Advanced directive counseling given: Yes    ACP document given: Yes    Patient declined ACP directive: No      Comments: ACP paper given to fill out and bring back    PREVENTIVE SCREENINGS      Cardiovascular Screening:    General: Screening Not Indicated and History Lipid Disorder      Colorectal Cancer Screening:     General: Screening Current      Breast Cancer Screening:     General: Screening Current      Cervical Cancer Screening:    General: Screening Current      Lung Cancer Screening:     General: Screening Current      Hepatitis C Screening:    General: Screening Current    Screening, Brief Intervention, and Referral to Treatment (SBIRT)    Screening  Typical number of drinks in a day: 0  Typical number of drinks in a week: 0  Interpretation:  "Low risk drinking behavior.    Single Item Drug Screening:  How often have you used an illegal drug (including marijuana) or a prescription medication for non-medical reasons in the past year? never    Single Item Drug Screen Score: 0  Interpretation: Negative screen for possible drug use disorder    Social Determinants of Health     Financial Resource Strain: Low Risk  (9/23/2024)    Overall Financial Resource Strain (CARDIA)     Difficulty of Paying Living Expenses: Not very hard   Food Insecurity: No Food Insecurity (9/23/2024)    Hunger Vital Sign     Worried About Running Out of Food in the Last Year: Never true     Ran Out of Food in the Last Year: Never true   Transportation Needs: No Transportation Needs (9/23/2024)    PRAPARE - Transportation     Lack of Transportation (Medical): No     Lack of Transportation (Non-Medical): No   Housing Stability: Low Risk  (9/23/2024)    Housing Stability Vital Sign     Unable to Pay for Housing in the Last Year: No     Number of Times Moved in the Last Year: 0     Homeless in the Last Year: No   Utilities: Not At Risk (9/23/2024)    [facility] Utilities     Threatened with loss of utilities: No     No results found.    Objective     /76 (BP Location: Left arm, Patient Position: Sitting, Cuff Size: Standard)   Pulse 69   Temp 97.5 °F (36.4 °C) (Axillary)   Resp 20   Ht 4' 11\" (1.499 m)   Wt 54.9 kg (121 lb 1.6 oz)   SpO2 97%   BMI 24.46 kg/m²     Physical Exam  Vitals reviewed.   Constitutional:       General: She is not in acute distress.     Appearance: Normal appearance. She is not ill-appearing.   HENT:      Head: Normocephalic and atraumatic.      Right Ear: Ear canal and external ear normal. There is impacted cerumen.      Left Ear: Ear canal and external ear normal. There is impacted cerumen.      Nose: Nose normal. No congestion or rhinorrhea.      Mouth/Throat:      Mouth: Mucous membranes are moist.      Pharynx: Oropharynx is clear. No oropharyngeal " exudate or posterior oropharyngeal erythema.   Eyes:      General:         Right eye: No discharge.         Left eye: No discharge.      Extraocular Movements: Extraocular movements intact.      Conjunctiva/sclera: Conjunctivae normal.      Pupils: Pupils are equal, round, and reactive to light.   Cardiovascular:      Rate and Rhythm: Normal rate and regular rhythm.      Pulses: Normal pulses.      Heart sounds: Normal heart sounds. No murmur heard.     No friction rub. No gallop.   Pulmonary:      Effort: Pulmonary effort is normal. No respiratory distress.      Breath sounds: Normal breath sounds. No stridor. No wheezing, rhonchi or rales.   Chest:      Chest wall: No tenderness.   Abdominal:      General: Abdomen is flat. Bowel sounds are normal. There is no distension.      Palpations: Abdomen is soft.      Tenderness: There is no abdominal tenderness. There is no guarding.   Musculoskeletal:         General: No swelling, tenderness, deformity or signs of injury. Normal range of motion.      Cervical back: Normal range of motion and neck supple. No deformity, signs of trauma, rigidity or tenderness.      Thoracic back: No deformity or signs of trauma. No scoliosis.      Lumbar back: No deformity or signs of trauma. No scoliosis.      Right lower leg: No edema.      Left lower leg: No edema.      Comments: Left thumb gets caught and clicks when flexing and extending   Lymphadenopathy:      Cervical: No cervical adenopathy.   Skin:     General: Skin is warm and dry.      Capillary Refill: Capillary refill takes less than 2 seconds.      Findings: No bruising, erythema, lesion or rash.   Neurological:      General: No focal deficit present.      Mental Status: She is alert and oriented to person, place, and time. Mental status is at baseline.      Motor: No weakness.      Gait: Gait normal.      Deep Tendon Reflexes: Reflexes normal.   Psychiatric:         Mood and Affect: Mood normal.         Behavior: Behavior  normal.         Thought Content: Thought content normal.

## 2024-09-23 NOTE — ASSESSMENT & PLAN NOTE
Extensive smoke cessation education given. Pt is not ready to quit. Last CT lung screening negative for nodules.    Pt follows pulmonology for her COPD.

## 2024-09-23 NOTE — PATIENT INSTRUCTIONS
Medicare Preventive Visit Patient Instructions  Thank you for completing your Welcome to Medicare Visit or Medicare Annual Wellness Visit today. Your next wellness visit will be due in one year (9/24/2025).  The screening/preventive services that you may require over the next 5-10 years are detailed below. Some tests may not apply to you based off risk factors and/or age. Screening tests ordered at today's visit but not completed yet may show as past due. Also, please note that scanned in results may not display below.  Preventive Screenings:  Service Recommendations Previous Testing/Comments   Colorectal Cancer Screening  * Colonoscopy    * Fecal Occult Blood Test (FOBT)/Fecal Immunochemical Test (FIT)  * Fecal DNA/Cologuard Test  * Flexible Sigmoidoscopy Age: 45-75 years old   Colonoscopy: every 10 years (may be performed more frequently if at higher risk)  OR  FOBT/FIT: every 1 year  OR  Cologuard: every 3 years  OR  Sigmoidoscopy: every 5 years  Screening may be recommended earlier than age 45 if at higher risk for colorectal cancer. Also, an individualized decision between you and your healthcare provider will decide whether screening between the ages of 76-85 would be appropriate. Colonoscopy: 12/13/2023  FOBT/FIT: Not on file  Cologuard: Not on file  Sigmoidoscopy: Not on file    Screening Current     Breast Cancer Screening Age: 40+ years old  Frequency: every 1-2 years  Not required if history of left and right mastectomy Mammogram: 11/27/2023    Screening Current   Cervical Cancer Screening Between the ages of 21-29, pap smear recommended once every 3 years.   Between the ages of 30-65, can perform pap smear with HPV co-testing every 5 years.   Recommendations may differ for women with a history of total hysterectomy, cervical cancer, or abnormal pap smears in past. Pap Smear: 10/20/2023    Screening Current   Hepatitis C Screening Once for adults born between 1945 and 1965  More frequently in patients at  high risk for Hepatitis C Hep C Antibody: 08/21/2024    Screening Current   Diabetes Screening 1-2 times per year if you're at risk for diabetes or have pre-diabetes Fasting glucose: No results in last 5 years (No results in last 5 years)  A1C: 5.3 % (5/31/2023)      Cholesterol Screening Once every 5 years if you don't have a lipid disorder. May order more often based on risk factors. Lipid panel: 06/01/2023    Screening Not Indicated  History Lipid Disorder     Other Preventive Screenings Covered by Medicare:  Abdominal Aortic Aneurysm (AAA) Screening: covered once if your at risk. You're considered to be at risk if you have a family history of AAA.  Lung Cancer Screening: covers low dose CT scan once per year if you meet all of the following conditions: (1) Age 55-77; (2) No signs or symptoms of lung cancer; (3) Current smoker or have quit smoking within the last 15 years; (4) You have a tobacco smoking history of at least 20 pack years (packs per day multiplied by number of years you smoked); (5) You get a written order from a healthcare provider.  Glaucoma Screening: covered annually if you're considered high risk: (1) You have diabetes OR (2) Family history of glaucoma OR (3)  aged 50 and older OR (4)  American aged 65 and older  Osteoporosis Screening: covered every 2 years if you meet one of the following conditions: (1) You're estrogen deficient and at risk for osteoporosis based off medical history and other findings; (2) Have a vertebral abnormality; (3) On glucocorticoid therapy for more than 3 months; (4) Have primary hyperparathyroidism; (5) On osteoporosis medications and need to assess response to drug therapy.   Last bone density test (DXA Scan): Not on file.  HIV Screening: covered annually if you're between the age of 15-65. Also covered annually if you are younger than 15 and older than 65 with risk factors for HIV infection. For pregnant patients, it is covered up to 3  times per pregnancy.    Immunizations:  Immunization Recommendations   Influenza Vaccine Annual influenza vaccination during flu season is recommended for all persons aged >= 6 months who do not have contraindications   Pneumococcal Vaccine   * Pneumococcal conjugate vaccine = PCV13 (Prevnar 13), PCV15 (Vaxneuvance), PCV20 (Prevnar 20)  * Pneumococcal polysaccharide vaccine = PPSV23 (Pneumovax) Adults 19-65 yo with certain risk factors or if 65+ yo  If never received any pneumonia vaccine: recommend Prevnar 20 (PCV20)  Give PCV20 if previously received 1 dose of PCV13 or PPSV23   Hepatitis B Vaccine 3 dose series if at intermediate or high risk (ex: diabetes, end stage renal disease, liver disease)   Respiratory syncytial virus (RSV) Vaccine - COVERED BY MEDICARE PART D  * RSVPreF3 (Arexvy) CDC recommends that adults 60 years of age and older may receive a single dose of RSV vaccine using shared clinical decision-making (SCDM)   Tetanus (Td) Vaccine - COST NOT COVERED BY MEDICARE PART B Following completion of primary series, a booster dose should be given every 10 years to maintain immunity against tetanus. Td may also be given as tetanus wound prophylaxis.   Tdap Vaccine - COST NOT COVERED BY MEDICARE PART B Recommended at least once for all adults. For pregnant patients, recommended with each pregnancy.   Shingles Vaccine (Shingrix) - COST NOT COVERED BY MEDICARE PART B  2 shot series recommended in those 19 years and older who have or will have weakened immune systems or those 50 years and older     Health Maintenance Due:      Topic Date Due   • HIV Screening  Never done   • Breast Cancer Screening: Mammogram  11/27/2024   • Lung Cancer Screening  09/16/2025   • Cervical Cancer Screening  10/16/2028   • Colorectal Cancer Screening  12/11/2028   • Hepatitis C Screening  Completed     Immunizations Due:      Topic Date Due   • Hepatitis A Vaccine (1 of 2 - Risk 2-dose series) Never done   • COVID-19 Vaccine (2 -  2023-24 season) 09/01/2024   • Influenza Vaccine (1) 09/01/2024     Advance Directives   What are advance directives?  Advance directives are legal documents that state your wishes and plans for medical care. These plans are made ahead of time in case you lose your ability to make decisions for yourself. Advance directives can apply to any medical decision, such as the treatments you want, and if you want to donate organs.   What are the types of advance directives?  There are many types of advance directives, and each state has rules about how to use them. You may choose a combination of any of the following:  Living will:  This is a written record of the treatment you want. You can also choose which treatments you do not want, which to limit, and which to stop at a certain time. This includes surgery, medicine, IV fluid, and tube feedings.   Durable power of  for healthcare (DPAHC):  This is a written record that states who you want to make healthcare choices for you when you are unable to make them for yourself. This person, called a proxy, is usually a family member or a friend. You may choose more than 1 proxy.  Do not resuscitate (DNR) order:  A DNR order is used in case your heart stops beating or you stop breathing. It is a request not to have certain forms of treatment, such as CPR. A DNR order may be included in other types of advance directives.  Medical directive:  This covers the care that you want if you are in a coma, near death, or unable to make decisions for yourself. You can list the treatments you want for each condition. Treatment may include pain medicine, surgery, blood transfusions, dialysis, IV or tube feedings, and a ventilator (breathing machine).  Values history:  This document has questions about your views, beliefs, and how you feel and think about life. This information can help others choose the care that you would choose.  Why are advance directives important?  An advance  directive helps you control your care. Although spoken wishes may be used, it is better to have your wishes written down. Spoken wishes can be misunderstood, or not followed. Treatments may be given even if you do not want them. An advance directive may make it easier for your family to make difficult choices about your care.   Cigarette Smoking and Your Health   Risks to your health if you smoke:  Nicotine and other chemicals found in tobacco damage every cell in your body. Even if you are a light smoker, you have an increased risk for cancer, heart disease, and lung disease. If you are pregnant or have diabetes, smoking increases your risk for complications.   Benefits to your health if you stop smoking:   You decrease respiratory symptoms such as coughing, wheezing, and shortness of breath.   You reduce your risk for cancers of the lung, mouth, throat, kidney, bladder, pancreas, stomach, and cervix. If you already have cancer, you increase the benefits of chemotherapy. You also reduce your risk for cancer returning or a second cancer from developing.   You reduce your risk for heart disease, blood clots, heart attack, and stroke.   You reduce your risk for lung infections, and diseases such as pneumonia, asthma, chronic bronchitis, and emphysema.  Your circulation improves. More oxygen can be delivered to your body. If you have diabetes, you lower your risk for complications, such as kidney, artery, and eye diseases. You also lower your risk for nerve damage. Nerve damage can lead to amputations, poor vision, and blindness.  You improve your body's ability to heal and to fight infections.  For more information and support to stop smoking:   [place]  Phone: 4- 408 - 884-4394  Web Address: www.[employer]     © Copyright [employer] 2018 Information is for End User's use only and may not be sold, redistributed or otherwise used for commercial purposes. All illustrations and images included in CareNotes®  are the copyrighted property of A.D.A.M., Inc. or [employer]

## 2024-09-25 ENCOUNTER — TELEPHONE (OUTPATIENT)
Age: 64
End: 2024-09-25

## 2024-09-25 NOTE — TELEPHONE ENCOUNTER
left on Rx Line       Hi Iona Toribio date of birth August 1st 1960. I need a refill for bupropion number number [id number]. I don't know the quantity but it goes to [facility]. You can reach me at [phone]. Thank you. Bye bye.  You received a voice mail from IONA TORIBIO.         Returned patients call notifying her that 2 refills are at the pharmacy.

## 2024-09-26 RX ORDER — SODIUM CHLORIDE, SODIUM LACTATE, POTASSIUM CHLORIDE, CALCIUM CHLORIDE 600; 310; 30; 20 MG/100ML; MG/100ML; MG/100ML; MG/100ML
50 INJECTION, SOLUTION INTRAVENOUS CONTINUOUS
OUTPATIENT
Start: 2024-09-26

## 2024-09-26 RX ORDER — LIDOCAINE HYDROCHLORIDE 10 MG/ML
0.5 INJECTION, SOLUTION EPIDURAL; INFILTRATION; INTRACAUDAL; PERINEURAL ONCE AS NEEDED
OUTPATIENT
Start: 2024-09-26

## 2024-09-27 ENCOUNTER — HOSPITAL ENCOUNTER (OUTPATIENT)
Dept: GASTROENTEROLOGY | Facility: HOSPITAL | Age: 64
Setting detail: OUTPATIENT SURGERY
Discharge: HOME | End: 2024-09-27
Attending: INTERNAL MEDICINE

## 2024-09-27 ENCOUNTER — TELEPHONE (OUTPATIENT)
Dept: GASTROENTEROLOGY | Facility: CLINIC | Age: 64
End: 2024-09-27

## 2024-09-27 VITALS
SYSTOLIC BLOOD PRESSURE: 133 MMHG | HEART RATE: 77 BPM | DIASTOLIC BLOOD PRESSURE: 63 MMHG | OXYGEN SATURATION: 95 % | RESPIRATION RATE: 18 BRPM

## 2024-09-27 DIAGNOSIS — K31.89 GASTRIC NODULE: ICD-10-CM

## 2024-09-27 DIAGNOSIS — K29.40 AUTOIMMUNE GASTRITIS: ICD-10-CM

## 2024-09-27 NOTE — TELEPHONE ENCOUNTER
Patient's EUS procedure has been rescheduled to 10/8/24 with Dr. Maradiaga at the [facility] as procedure is with ESD (which only [facility] does). Patient was mistakenly scheduled with Dr. Ibarra for today by another office.  Office note from 6/24/24 clearly states patient should have been scheduled with Dr. Maradiaga only  Patient was very understanding of the situation.

## 2024-09-27 NOTE — H&P
"History and Physical -  [facility]  Iona Toribio 64 y.o. female MRN: [id number]                  HPI: Iona Toribio is a 64 y.o. year old female who presents for EGD with eFTR for gastric nodule (hypoechoic on EUS)      REVIEW OF SYSTEMS: Per the HPI, and otherwise unremarkable.    Historical Information   Past Medical History:   Diagnosis Date    Anxiety     Depression     Disease of thyroid gland     Elevated troponin 5/31/2023    History of alcohol abuse 5/31/2023    Substance abuse (HCC)      Past Surgical History:   Procedure Laterality Date    BACK SURGERY  01/01/1973    scolosis rods    CARDIAC CATHETERIZATION N/A 07/25/2023    Procedure: Cardiac catheterization;  Surgeon: Lizet Ibarra MD;  Location: [facility];  Service: Cardiology    COLONOSCOPY      ELBOW SURGERY  05/01/2018    UPPER GASTROINTESTINAL ENDOSCOPY      WISDOM TOOTH EXTRACTION       Social History   Social History     Substance and Sexual Activity   Alcohol Use Not Currently    Comment: none 7/2018-went to rehab     Social History     Substance and Sexual Activity   Drug Use Not Currently    Comment: \"Could have been anything\" p/ pt 06/24/24     Social History     Tobacco Use   Smoking Status Some Days    Current packs/day: 1.50    Average packs/day: 1.5 packs/day for 30.0 years (45.0 ttl pk-yrs)    Types: Cigarettes   Smokeless Tobacco Never   Tobacco Comments    Currently smokes 1 ppd 9/5/2023     Family History   Problem Relation Age of Onset    Colon cancer Mother     Breast cancer Mother     Cancer Mother     Leukemia Father        Meds/Allergies       Current Outpatient Medications:     atorvastatin (LIPITOR) 40 mg tablet    Biotin 30083 MCG TABS    buPROPion (WELLBUTRIN SR) 150 mg 12 hr tablet    carbamide peroxide (DEBROX) 6.5 % otic solution    cetirizine (ZyrTEC) 10 MG chewable tablet    docusate sodium (COLACE) 100 mg capsule    ferrous sulfate 324 (65 Fe) mg    fluticasone (FLONASE) 50 mcg/act nasal " spray    fluticasone-umeclidinium-vilanterol (Trelegy Ellipta) 100-62.5-25 mcg/actuation inhaler    gabapentin (NEURONTIN) 400 mg capsule    hydrOXYzine HCL (ATARAX) 25 mg tablet    levalbuterol (XOPENEX) 1.25 mg/0.5 mL nebulizer solution    levalbuterol (XOPENEX) 1.25 mg/3 mL nebulizer solution    levothyroxine 100 mcg tablet    metoprolol tartrate (LOPRESSOR) 25 mg tablet    multivitamin (THERAGRAN) TABS    pantoprazole (PROTONIX) 40 mg tablet    polyethylene glycol (MIRALAX) 17 g packet    QUEtiapine (SEROquel) 25 mg tablet    Ventolin  (90 Base) MCG/ACT inhaler    vitamin B-12 (VITAMIN B-12) 1,000 mcg tablet    Allergies   Allergen Reactions    Other Allergic Rhinitis     seasonal       Objective     There were no vitals taken for this visit.      PHYSICAL EXAM    Gen: NAD  Head: NCAT  CV: RRR  CHEST: Clear  ABD: soft, NT/ND  EXT: no edema      ASSESSMENT/PLAN:  This is a 64 y.o. year old female here for EGD with FTRD, and she is stable and optimized for her procedure.

## 2024-09-28 DIAGNOSIS — J44.9 CHRONIC OBSTRUCTIVE PULMONARY DISEASE, UNSPECIFIED COPD TYPE (HCC): ICD-10-CM

## 2024-09-28 DIAGNOSIS — K29.70 GASTRITIS WITHOUT BLEEDING, UNSPECIFIED CHRONICITY, UNSPECIFIED GASTRITIS TYPE: ICD-10-CM

## 2024-09-28 RX ORDER — PANTOPRAZOLE SODIUM 40 MG/1
40 TABLET, DELAYED RELEASE ORAL DAILY
Qty: 90 TABLET | Refills: 0 | Status: SHIPPED | OUTPATIENT
Start: 2024-09-28

## 2024-09-30 RX ORDER — LEVALBUTEROL 1.25 MG/.5ML
1.25 SOLUTION, CONCENTRATE RESPIRATORY (INHALATION) EVERY 8 HOURS PRN
Qty: 180 ML | Refills: 5 | Status: SHIPPED | OUTPATIENT
Start: 2024-09-30

## 2024-10-06 DIAGNOSIS — L82.0 SEBORRHEIC KERATOSES, INFLAMED: ICD-10-CM

## 2024-10-07 ENCOUNTER — OFFICE VISIT (OUTPATIENT)
Age: 64
End: 2024-10-07

## 2024-10-07 VITALS
TEMPERATURE: 98.2 F | SYSTOLIC BLOOD PRESSURE: 126 MMHG | DIASTOLIC BLOOD PRESSURE: 77 MMHG | BODY MASS INDEX: 24.44 KG/M2 | HEART RATE: 78 BPM | WEIGHT: 121 LBS | OXYGEN SATURATION: 95 % | RESPIRATION RATE: 18 BRPM

## 2024-10-07 DIAGNOSIS — H93.8X3 IRRITATION OF EAR, BILATERAL: Primary | ICD-10-CM

## 2024-10-07 PROCEDURE — G2211 COMPLEX E/M VISIT ADD ON: HCPCS | Performed by: FAMILY MEDICINE

## 2024-10-07 PROCEDURE — 99212 OFFICE O/P EST SF 10 MIN: CPT | Performed by: FAMILY MEDICINE

## 2024-10-07 RX ORDER — METOPROLOL TARTRATE 25 MG/1
TABLET, FILM COATED ORAL
Qty: 60 TABLET | Refills: 1 | Status: SHIPPED | OUTPATIENT
Start: 2024-10-07

## 2024-10-07 NOTE — PROGRESS NOTES
Name: Iona Toribio      : 1960      MRN: [id number]  Encounter Provider: Betty Ahumada MD  Encounter Date: 10/7/2024   Encounter department: [facility]    Assessment & Plan    1. Irritation of ear, bilateral  Reminded patient to continue to avoid using Q-tips since it can push the ear wax back into the ear.       Subjective    HPI   Pt presents today for bilateral ear irrigation    Objective    /77 (BP Location: Left arm, Patient Position: Sitting, Cuff Size: Standard)   Pulse 78   Temp 98.2 °F (36.8 °C) (Tympanic)   Resp 18   Wt 54.9 kg (121 lb)   SpO2 95%   BMI 24.44 kg/m²      Procedures  Betty Ahumada MD

## 2024-10-14 ENCOUNTER — TELEPHONE (OUTPATIENT)
Age: 64
End: 2024-10-14

## 2024-10-14 ENCOUNTER — APPOINTMENT (OUTPATIENT)
Dept: LAB | Facility: HOSPITAL | Age: 64
End: 2024-10-14
Payer: COMMERCIAL

## 2024-10-14 ENCOUNTER — HOSPITAL ENCOUNTER (OUTPATIENT)
Dept: RADIOLOGY | Facility: HOSPITAL | Age: 64
Discharge: HOME | End: 2024-10-14
Payer: COMMERCIAL

## 2024-10-14 DIAGNOSIS — M79.645 PAIN OF LEFT THUMB: ICD-10-CM

## 2024-10-14 DIAGNOSIS — M19.042 PRIMARY OSTEOARTHRITIS OF LEFT HAND: Primary | ICD-10-CM

## 2024-10-14 PROCEDURE — 73130 X-RAY EXAM OF HAND: CPT

## 2024-10-14 NOTE — TELEPHONE ENCOUNTER
Pt was called to notify that her left hand xray showed Multifocal osteoarthritis, severe at the first carpometacarpal joint. Pt was referred orthopedic surgery and advised to make appointment with a hand specialist. Pt acknowledged her understanding and answered all her questions.

## 2024-10-21 ENCOUNTER — OFFICE VISIT (OUTPATIENT)
Dept: OBGYN CLINIC | Facility: CLINIC | Age: 64
End: 2024-10-21
Payer: COMMERCIAL

## 2024-10-21 ENCOUNTER — TRANSCRIBE ORDERS (OUTPATIENT)
Dept: LAB | Facility: CLINIC | Age: 64
End: 2024-10-21

## 2024-10-21 ENCOUNTER — LAB (OUTPATIENT)
Dept: LAB | Facility: CLINIC | Age: 64
End: 2024-10-21
Payer: COMMERCIAL

## 2024-10-21 VITALS
SYSTOLIC BLOOD PRESSURE: 126 MMHG | WEIGHT: 121 LBS | HEART RATE: 72 BPM | DIASTOLIC BLOOD PRESSURE: 76 MMHG | BODY MASS INDEX: 24.39 KG/M2 | HEIGHT: 59 IN

## 2024-10-21 DIAGNOSIS — M18.12 PRIMARY OSTEOARTHRITIS OF FIRST CARPOMETACARPAL JOINT OF LEFT HAND: ICD-10-CM

## 2024-10-21 DIAGNOSIS — E78.00 PURE HYPERCHOLESTEROLEMIA: Primary | ICD-10-CM

## 2024-10-21 DIAGNOSIS — E78.00 PURE HYPERCHOLESTEROLEMIA: ICD-10-CM

## 2024-10-21 DIAGNOSIS — Z11.4 SCREENING FOR HIV (HUMAN IMMUNODEFICIENCY VIRUS): ICD-10-CM

## 2024-10-21 DIAGNOSIS — M65.312 TRIGGER THUMB OF LEFT HAND: Primary | ICD-10-CM

## 2024-10-21 LAB
CHOLEST SERPL-MCNC: 164 MG/DL
HDLC SERPL-MCNC: 70 MG/DL
LDLC SERPL CALC-MCNC: 80 MG/DL (ref 0–100)
NONHDLC SERPL-MCNC: 94 MG/DL
T4 FREE SERPL-MCNC: 1.26 NG/DL (ref 0.61–1.12)
TRIGL SERPL-MCNC: 69 MG/DL
TSH SERPL DL<=0.05 MIU/L-ACNC: 1.02 UIU/ML (ref 0.45–4.5)

## 2024-10-21 PROCEDURE — 80061 LIPID PANEL: CPT

## 2024-10-21 PROCEDURE — 87389 HIV-1 AG W/HIV-1&-2 AB AG IA: CPT

## 2024-10-21 PROCEDURE — 36415 COLL VENOUS BLD VENIPUNCTURE: CPT

## 2024-10-21 PROCEDURE — 99204 OFFICE O/P NEW MOD 45 MIN: CPT | Performed by: STUDENT IN AN ORGANIZED HEALTH CARE EDUCATION/TRAINING PROGRAM

## 2024-10-21 PROCEDURE — 84443 ASSAY THYROID STIM HORMONE: CPT

## 2024-10-21 PROCEDURE — 20550 NJX 1 TENDON SHEATH/LIGAMENT: CPT | Performed by: STUDENT IN AN ORGANIZED HEALTH CARE EDUCATION/TRAINING PROGRAM

## 2024-10-21 PROCEDURE — 84439 ASSAY OF FREE THYROXINE: CPT

## 2024-10-21 RX ORDER — BETAMETHASONE SODIUM PHOSPHATE AND BETAMETHASONE ACETATE 3; 3 MG/ML; MG/ML
3 INJECTION, SUSPENSION INTRA-ARTICULAR; INTRALESIONAL; INTRAMUSCULAR; SOFT TISSUE
Status: COMPLETED | OUTPATIENT
Start: 2024-10-21 | End: 2024-10-21

## 2024-10-21 RX ORDER — LIDOCAINE HYDROCHLORIDE 10 MG/ML
1 INJECTION, SOLUTION INFILTRATION; PERINEURAL
Status: COMPLETED | OUTPATIENT
Start: 2024-10-21 | End: 2024-10-21

## 2024-10-21 RX ADMIN — BETAMETHASONE SODIUM PHOSPHATE AND BETAMETHASONE ACETATE 3 MG: 3; 3 INJECTION, SUSPENSION INTRA-ARTICULAR; INTRALESIONAL; INTRAMUSCULAR; SOFT TISSUE at 11:00

## 2024-10-21 RX ADMIN — LIDOCAINE HYDROCHLORIDE 1 ML: 10 INJECTION, SOLUTION INFILTRATION; PERINEURAL at 11:00

## 2024-10-21 NOTE — PROGRESS NOTES
ORTHOPAEDIC HAND, WRIST, AND ELBOW OFFICE  VISIT     ASSESSMENT/PLAN:    Iona Toribio is a 64 y.o. RHD female who presents with left trigger thumb and severe CMC osteoarthritis     The patient verbalized understanding of exam findings and treatment plan. We engaged in the shared decision-making process and treatment options were discussed at length with the patient. Surgical and conservative management discussed today along with risks and benefits.  A left thumb trigger finger corticosteroid injection was offered, accepted, and administered in clinic. Procedure tolerated well, out lined below. Post injection protocol advised.   Discussed with patient that she can follow up in 6 weeks for repeat CSI if symptoms do not improve.      Follow Up:  If symptoms worsen or fail to improve      General Discussions:       Trigger FInger: The anatomy and physiology of trigger finger was discussed with the patient today in the office.  Edema and increased contact pressure within the flexor tendons at the A1 pulley can cause pain, crepitation, and limitation of function.  Treatment options include resting MP blocking splints to decrease edema, oral anti-inflammatory medications, home or formal therapy exercises, up to 2 steroid injections within the tendon sheath, or surgical release.  While majority of patients do respond to conservative treatment, up to 20% may require surgical release.       ____________________________________________________________________________________________________________________________________________      CHIEF COMPLAINT:  Left thumb pain     SUBJECTIVE:  Iona Toribio is a 64 y.o. female who presents for initial evaluation of left thumb pain. Pain has been on going for some times, denies injury or trauma. Pain is localized to the A1 pulley, patient notes triggering through out the day. She has not previously tried corticosteroid injections.   Previous Treatments: None   Associated symptoms:  "Locking  Handedness: right  Work status: retired    I have personally reviewed all the relevant PMH, PSH, SH, FH, Medications and allergies      PAST MEDICAL HISTORY:  Past Medical History:   Diagnosis Date    Anxiety     Depression     Disease of thyroid gland     Elevated troponin 5/31/2023    History of alcohol abuse 5/31/2023    Substance abuse (HCC)        PAST SURGICAL HISTORY:  Past Surgical History:   Procedure Laterality Date    BACK SURGERY  01/01/1973    scolosis rods    CARDIAC CATHETERIZATION N/A 07/25/2023    Procedure: Cardiac catheterization;  Surgeon: Lizet Ibarra MD;  Location: [facility];  Service: Cardiology    CARDIAC ELECTROPHYSIOLOGY STUDY AND ABLATION      COLONOSCOPY      ELBOW SURGERY  05/01/2018    UPPER GASTROINTESTINAL ENDOSCOPY      WISDOM TOOTH EXTRACTION         FAMILY HISTORY:  Family History   Problem Relation Age of Onset    Colon cancer Mother     Breast cancer Mother     Cancer Mother     Leukemia Father        SOCIAL HISTORY:  Social History     Tobacco Use    Smoking status: Some Days     Current packs/day: 1.50     Average packs/day: 1.5 packs/day for 30.0 years (45.0 ttl pk-yrs)     Types: Cigarettes    Smokeless tobacco: Never    Tobacco comments:     Currently smokes 1 ppd 9/5/2023   Vaping Use    Vaping status: Some Days    Substances: Nicotine   Substance Use Topics    Alcohol use: Not Currently     Comment: none 7/2018-went to rehab    Drug use: Not Currently     Comment: \"Could have been anything\" p/ pt 06/24/24       MEDICATIONS:    Current Outpatient Medications:     atorvastatin (LIPITOR) 40 mg tablet, Take 1 tablet (40 mg total) by mouth daily, Disp: 90 tablet, Rfl: 3    Biotin 98923 MCG TABS, Take by mouth, Disp: , Rfl:     buPROPion (WELLBUTRIN SR) 150 mg 12 hr tablet, TAKE 1 TABLET TWO TIMES A DAY, Disp: 60 tablet, Rfl: 2    carbamide peroxide (DEBROX) 6.5 % otic solution, Administer 5 drops into both ears 2 (two) times a day, Disp: 15 mL, Rfl: 0    " cetirizine (ZyrTEC) 10 MG chewable tablet, Chew 1 tablet (10 mg total) daily, Disp: 90 tablet, Rfl: 2    docusate sodium (COLACE) 100 mg capsule, Take 1 capsule (100 mg total) by mouth daily Pt verbalized she takes this once daily, Disp: , Rfl:     ferrous sulfate 324 (65 Fe) mg, Take 1 tablet (324 mg total) by mouth daily before breakfast, Disp: 30 tablet, Rfl: 0    fluticasone (FLONASE) 50 mcg/act nasal spray, 1 SPRAY INTO EACH NOSTRIL DAILY, Disp: 16 g, Rfl: 3    gabapentin (NEURONTIN) 400 mg capsule, Take 400 mg by mouth 3 (three) times a day, Disp: , Rfl:     hydrOXYzine HCL (ATARAX) 25 mg tablet, Take 25 mg by mouth 3 (three) times a day as needed, Disp: , Rfl:     levalbuterol (XOPENEX) 1.25 mg/0.5 mL nebulizer solution, Take 0.5 mL (1.25 mg total) by nebulization every 8 (eight) hours as needed for wheezing, Disp: 180 mL, Rfl: 5    levalbuterol (XOPENEX) 1.25 mg/3 mL nebulizer solution, , Disp: , Rfl:     levothyroxine 100 mcg tablet, Take 1 tablet (100 mcg total) by mouth daily in the early morning, Disp: 100 tablet, Rfl: 0    metoprolol tartrate (LOPRESSOR) 25 mg tablet, TAKE HALF TABLET EVERY 12 HOURS, Disp: 60 tablet, Rfl: 1    multivitamin (THERAGRAN) TABS, Take 1 tablet by mouth daily, Disp: , Rfl:     pantoprazole (PROTONIX) 40 mg tablet, Take 1 tablet (40 mg total) by mouth daily, Disp: 90 tablet, Rfl: 0    QUEtiapine (SEROquel) 25 mg tablet, Take 25 mg by mouth daily at bedtime as needed (for sleep), Disp: , Rfl:     Ventolin  (90 Base) MCG/ACT inhaler, INHALE 2 PUFFS EVERY 6 HOURS AS NEEDED FOR WHEEZING, Disp: 18 g, Rfl: 1    vitamin B-12 (VITAMIN B-12) 1,000 mcg tablet, Take 1 tablet (1,000 mcg total) by mouth daily, Disp: 30 tablet, Rfl: 0    fluticasone-umeclidinium-vilanterol (Trelegy Ellipta) 100-62.5-25 mcg/actuation inhaler, Inhale 1 puff daily Rinse mouth after use., Disp: 180 blister, Rfl: 9    polyethylene glycol (MIRALAX) 17 g packet, Take 17 g by mouth daily as needed  (constipation) (Patient not taking: Reported on 9/23/2024), Disp: 20 each, Rfl: 0    ALLERGIES:  Allergies   Allergen Reactions    Other Allergic Rhinitis     seasonal           REVIEW OF SYSTEMS:  Pertinent items are noted in HPI.  A comprehensive review of systems was negative.    VITALS:  Vitals:    10/21/24 1044   BP: 126/76   Pulse: 72       LABS:  HgA1c:   Lab Results   Component Value Date    HGBA1C 5.3 05/31/2023     BMP:   Lab Results   Component Value Date    CALCIUM 9.2 07/27/2023    K 3.6 07/27/2023    CO2 30 07/27/2023    CL 99 07/27/2023    BUN 15 07/27/2023    CREATININE 0.50 (L) 07/27/2023       _____________________________________________________  PHYSICAL EXAMINATION:  General: well developed and well nourished, alert, oriented times 3, and appears comfortable  Psychiatric: Normal  HEENT: Normocephalic, Atraumatic Trachea Midline, No torticollis  Pulmonary: No audible wheezing or respiratory distress   Abdomen/GI: Non tender, non distended   Cardiovascular: No pitting edema, 2+ radial pulse   Skin: No masses, erythema, lacerations, fluctation, ulcerations  Neurovascular: Sensation Intact to the Median, Ulnar, Radial Nerve, Motor Intact to the Median, Ulnar, Radial Nerve, and Pulses Intact  Musculoskeletal: Normal, except as noted in detailed exam and in HPI.        FOCUSED MUSCULOSKELETAL EXAMINATION:    Left Upper Extremity  Inspection: skin intact, no notable deformity   Palpation: TTP A1 pulley thumb, CMC   Neurologic: 5/5 elbow flexion, 5/5 elbow extension, 5/5 wrist extension, 5/5 wrist flexion, 5/5 finger flexion, 5/5 finger extension, 5/5 FPL, 5/5 EPL, 5/5 APB, 5/5 intrinsics, sensation intact to median, radial, and ulnar nerve distributions  Vascular: Palpable radial pulse, brisk cap refill <2sec, hand warm and well perfused  MSK:   + CMC grind   Full FDS, FDP, extensor mechanisms are intact  No rotational deformity with composite finger flexion  TTP with palpable nodule in the thumb  flexor tendon local to A1 Pulley  Reproducible triggering on exam  Demonstrates normal wrist, elbow, and shoulder motion  Forearm compartments are soft and supple  2+ distal radial pulse with brisk capillary refill to the fingers  Radial, median, and ulnar motor and sensory distribution intact  Sensations light to touch intact distally    ___________________________________________________  STUDIES REVIEWED:  Xrays of the left hand were obtained on 10/14/24 were independently reviewed which demonstrates severe CMC osteoarthritis, degenerative changes at the triscaphe joint.    LABS REVIEWED:    HgA1c:   Lab Results   Component Value Date    HGBA1C 5.3 05/31/2023     BMP:   Lab Results   Component Value Date    CALCIUM 9.2 07/27/2023    K 3.6 07/27/2023    CO2 30 07/27/2023    CL 99 07/27/2023    BUN 15 07/27/2023    CREATININE 0.50 (L) 07/27/2023               PROCEDURES PERFORMED:  Hand/upper extremity injection: L thumb A1  Universal Protocol:  procedure performed by consultantConsent: Verbal consent obtained.  Risks and benefits: risks, benefits and alternatives were discussed  Consent given by: patient  Timeout called at: 10/21/2024 10:54 AM.  Patient understanding: patient states understanding of the procedure being performed  Patient identity confirmed: verbally with patient  Supporting Documentation  Indications: diagnostic, tendon swelling and pain   Procedure Details  Condition:trigger finger Location: thumb - L thumb A1   Preparation: Patient was prepped and draped in the usual sterile fashion  Needle size: 25 G  Ultrasound guidance: no  Medications administered: 1 mL lidocaine 1 %; 3 mg betamethasone acetate-betamethasone sodium phosphate 6 (3-3) mg/mL  Patient tolerance: patient tolerated the procedure well with no immediate complications  Dressing:  Sterile dressing applied             _____________________________________________________      Elizabeth Attestation      I,:  Betsy Nguyen am acting as a  scribe while in the presence of the attending physician.:       I,:  Hardik Dixon MD personally performed the services described in this documentation    as scribed in my presence.:               I agree with the history, physical examination, assessment and plan of care as documented above.    Hardik Dixon M.D.  Attending, Orthopaedic Surgery  Hand, Wrist, and Elbow Surgery  [facility]

## 2024-10-22 ENCOUNTER — TELEPHONE (OUTPATIENT)
Dept: OTHER | Facility: HOSPITAL | Age: 64
End: 2024-10-22

## 2024-10-22 DIAGNOSIS — E03.9 ACQUIRED HYPOTHYROIDISM: ICD-10-CM

## 2024-10-22 LAB
HIV 1+2 AB+HIV1 P24 AG SERPL QL IA: NORMAL
HIV 2 AB SERPL QL IA: NORMAL
HIV1 AB SERPL QL IA: NORMAL
HIV1 P24 AG SERPL QL IA: NORMAL

## 2024-10-22 RX ORDER — LEVOTHYROXINE SODIUM 88 UG/1
88 TABLET ORAL
Qty: 90 TABLET | Refills: 0 | Status: SHIPPED | OUTPATIENT
Start: 2024-10-22

## 2024-10-22 NOTE — RESULT ENCOUNTER NOTE
Pt was called to notify that her HIV screening was negative and lipid panel was normal. Pt's TSH was normal but free T4 was still high. We lowered Pt's levothyroxine last time from 112mcg to 100mcg. Will further lower levothyroxine to 88mcg and will recheck TSH and T4 in 6-8 weeks. Pt acknowledged her understanding and answered all her questions.

## 2024-12-02 ENCOUNTER — HOSPITAL ENCOUNTER (OUTPATIENT)
Dept: RADIOLOGY | Facility: HOSPITAL | Age: 64
Discharge: HOME | End: 2024-12-02
Payer: COMMERCIAL

## 2024-12-02 VITALS — BODY MASS INDEX: 24.19 KG/M2 | HEIGHT: 59 IN | WEIGHT: 120 LBS

## 2024-12-02 DIAGNOSIS — Z12.31 ENCOUNTER FOR SCREENING MAMMOGRAM FOR BREAST CANCER: ICD-10-CM

## 2024-12-02 PROCEDURE — 77067 SCR MAMMO BI INCL CAD: CPT

## 2024-12-02 PROCEDURE — 77063 BREAST TOMOSYNTHESIS BI: CPT

## 2024-12-05 ENCOUNTER — RESULTS FOLLOW-UP (OUTPATIENT)
Age: 64
End: 2024-12-05

## 2024-12-05 NOTE — TELEPHONE ENCOUNTER
Pt was called to notify that her mammogram was benign (normal). However, I could not reach the Pt on the phone after trying multiple times. VM was left giving the result and Pt was advised to repeat her yearly mammogram next year.

## 2024-12-06 ENCOUNTER — DOCUMENTATION (OUTPATIENT)
Dept: GASTROENTEROLOGY | Facility: MEDICAL CENTER | Age: 64
End: 2024-12-06

## 2024-12-06 DIAGNOSIS — K31.89 GASTRIC NODULE: Primary | ICD-10-CM

## 2024-12-06 NOTE — PROGRESS NOTES
The patient has a gastric nodule.  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  This biopsy may not have been completely representative of the lesion.  On repeat endoscopic evaluation it appeared to b similar size.  An ESD wase being planned however it is not offered here at this time.  I have referred the patient to Dr. Bruce Dawkins at [facility].

## 2024-12-11 DIAGNOSIS — J20.9 ACUTE BRONCHITIS, UNSPECIFIED ORGANISM: ICD-10-CM

## 2024-12-11 RX ORDER — ALBUTEROL SULFATE 90 UG/1
AEROSOL, METERED RESPIRATORY (INHALATION)
Qty: 18 G | Refills: 1 | Status: SHIPPED | OUTPATIENT
Start: 2024-12-11

## 2025-01-03 DIAGNOSIS — Z71.6 ENCOUNTER FOR TOBACCO USE CESSATION COUNSELING: ICD-10-CM

## 2025-01-03 RX ORDER — BUPROPION HYDROCHLORIDE 150 MG/1
150 TABLET, EXTENDED RELEASE ORAL 2 TIMES DAILY
Qty: 180 TABLET | Refills: 0 | Status: SHIPPED | OUTPATIENT
Start: 2025-01-03

## 2025-01-03 NOTE — TELEPHONE ENCOUNTER
Vm on rx line:    This is for Iona Toribio, date of birth August 1st, 1960. I need a refill from Bupropion HCL SR-150 milligrams milligram tablet from Doctor Emmanuelle. Doctor Emmanuelle has to authorize it 'cause there's no refills. Pharmacies. [facility], their number [phone]. My phone is [phone]. Thank you.  You received a voice mail from IONA TORIBIO.

## 2025-01-05 DIAGNOSIS — K29.70 GASTRITIS WITHOUT BLEEDING, UNSPECIFIED CHRONICITY, UNSPECIFIED GASTRITIS TYPE: ICD-10-CM

## 2025-01-07 RX ORDER — PANTOPRAZOLE SODIUM 40 MG/1
40 TABLET, DELAYED RELEASE ORAL DAILY
Qty: 90 TABLET | Refills: 1 | Status: SHIPPED | OUTPATIENT
Start: 2025-01-07

## 2025-01-15 ENCOUNTER — TELEMEDICINE (OUTPATIENT)
Age: 65
End: 2025-01-15

## 2025-01-15 DIAGNOSIS — R09.81 NASAL CONGESTION: ICD-10-CM

## 2025-01-15 DIAGNOSIS — J43.1 PANLOBULAR EMPHYSEMA (HCC): ICD-10-CM

## 2025-01-15 DIAGNOSIS — J34.89 SINUS PRESSURE: Primary | ICD-10-CM

## 2025-01-15 PROCEDURE — 98008 SYNCH AUDIO-ONLY NEW SF 15: CPT | Performed by: FAMILY MEDICINE

## 2025-01-15 RX ORDER — ALBUTEROL SULFATE 90 UG/1
2 INHALANT RESPIRATORY (INHALATION) EVERY 6 HOURS PRN
Qty: 18 G | Refills: 1 | Status: SHIPPED | OUTPATIENT
Start: 2025-01-15

## 2025-01-15 RX ORDER — CETIRIZINE HYDROCHLORIDE 10 MG/1
10 TABLET, CHEWABLE ORAL DAILY
Qty: 90 TABLET | Refills: 2 | Status: SHIPPED | OUTPATIENT
Start: 2025-01-15

## 2025-01-15 NOTE — PROGRESS NOTES
Virtual Regular Visit  Name: Iona Toribio      : 1960      MRN: [id number]  Encounter Provider: Jona Pennington MD  Encounter Date: 1/15/2025   Encounter department: [facility]      Verification of patient location:  Patient is located at Home in the following state in which I hold an active license [place] :  Assessment & Plan  Sinus pressure  Sinus pressure, nasal congestion x 2 days. No fever/chills, sore throat, or cough  - Ran out of Zyrtec. Refilled.  - Continue saline nasal spray which is helping  - Will monitor for now given recent onset of symptoms and mild severity. Advised to call if worsening fever/chills, pain       Nasal congestion  Refilled  Orders:  •  cetirizine (ZyrTEC) 10 MG chewable tablet; Chew 1 tablet (10 mg total) daily    Panlobular emphysema (HCC)  Refilled  Orders:  •  albuterol (Ventolin HFA) 90 mcg/act inhaler; Inhale 2 puffs every 6 (six) hours as needed for wheezing        Encounter provider Jona Pennington MD    The patient was identified by name and date of birth. Iona Toribio was informed that this is a telemedicine visit and that the visit is being conducted through Telephone.  My office door was closed. No one else was in the room.  She acknowledged consent and understanding of privacy and security of the video platform. The patient has agreed to participate and understands they can discontinue the visit at any time.    Patient is aware this is a billable service.     History of Present Illness {?Quick Links Encounters * My Last Note * Last Note in Specialty * Snapshot * Since Last Visit * History :47502}    Sinus Problem  This is a recurrent problem. The current episode started in the past 7 days. The problem is unchanged. There has been no fever. Associated symptoms include congestion, headaches and a hoarse voice. Pertinent negatives include no chills, ear pain, shortness of breath or sore throat. Past treatments include acetaminophen (Mucinex,  Saline).     Review of Systems   Constitutional:  Negative for chills.   HENT:  Positive for congestion and hoarse voice. Negative for ear pain and sore throat.    Respiratory:  Negative for shortness of breath.    Neurological:  Positive for headaches.       Objective {?Quick Links Trend Vitals * Enter New Vitals * Results Review * Timeline (Adult) * Labs * Imaging * Cardiology * Procedures * Lung Cancer Screening * Surgical eConsent :10637}  There were no vitals taken for this visit.    Physical Exam  Constitutional:       Comments: Virtual   Pulmonary:      Comments: Speaking in complete sentences in no apparent respiratory distress  Psychiatric:         Mood and Affect: Mood normal.         Behavior: Behavior normal.      Comments: Pleasant elderly female         Visit Time  Total Visit Duration: 8 mins

## 2025-01-15 NOTE — ASSESSMENT & PLAN NOTE
Refilled  Orders:  •  cetirizine (ZyrTEC) 10 MG chewable tablet; Chew 1 tablet (10 mg total) daily

## 2025-01-15 NOTE — ASSESSMENT & PLAN NOTE
Refilled  Orders:  •  albuterol (Ventolin HFA) 90 mcg/act inhaler; Inhale 2 puffs every 6 (six) hours as needed for wheezing

## 2025-01-27 DIAGNOSIS — E03.9 ACQUIRED HYPOTHYROIDISM: ICD-10-CM

## 2025-01-27 RX ORDER — LEVOTHYROXINE SODIUM 88 UG/1
88 TABLET ORAL
Qty: 90 TABLET | Refills: 0 | Status: SHIPPED | OUTPATIENT
Start: 2025-01-27

## 2025-01-27 NOTE — TELEPHONE ENCOUNTER
VM left on Rx Line     Hi, my name is Iona Catarino. My YOB: 1960. My home phone is [phone]. I need a refill of Levothyroxine 88 microgram tablet. The amount is 90 and it goes to [facility] and their number is [phone]. Thank you. Bye bye.    You received a voice mail from IONA WHITE.

## 2025-01-28 ENCOUNTER — TELEPHONE (OUTPATIENT)
Age: 65
End: 2025-01-28

## 2025-01-28 NOTE — TELEPHONE ENCOUNTER
Patient called because she received notice that her albuterol inhaler will alea longer be covered by her insurance. I advised her that this inhaler was prescribed by her family doctor and she would need to contact their office for further assistance. I also advised patient that if she wants to have pulmonary take over her inhalers for her she would need to establish care with a new provider as Dr. Beltran left our practice. Patient will call back another time to schedule an appointment with another provider.      Thank you.

## 2025-02-03 ENCOUNTER — OFFICE VISIT (OUTPATIENT)
Age: 65
End: 2025-02-03

## 2025-02-03 VITALS
BODY MASS INDEX: 26.21 KG/M2 | WEIGHT: 130 LBS | DIASTOLIC BLOOD PRESSURE: 78 MMHG | HEIGHT: 59 IN | HEART RATE: 66 BPM | SYSTOLIC BLOOD PRESSURE: 130 MMHG

## 2025-02-03 DIAGNOSIS — Z01.818 PREOPERATIVE EXAMINATION: Primary | ICD-10-CM

## 2025-02-03 DIAGNOSIS — J44.9 CHRONIC OBSTRUCTIVE PULMONARY DISEASE, UNSPECIFIED COPD TYPE (HCC): ICD-10-CM

## 2025-02-03 PROCEDURE — 99214 OFFICE O/P EST MOD 30 MIN: CPT | Performed by: FAMILY MEDICINE

## 2025-02-03 PROCEDURE — G2211 COMPLEX E/M VISIT ADD ON: HCPCS | Performed by: FAMILY MEDICINE

## 2025-02-03 PROCEDURE — 93000 ELECTROCARDIOGRAM COMPLETE: CPT | Performed by: FAMILY MEDICINE

## 2025-02-03 RX ORDER — HYDROXYZINE HYDROCHLORIDE 50 MG/1
50 TABLET, FILM COATED ORAL 3 TIMES DAILY
COMMUNITY
Start: 2025-01-20

## 2025-02-03 RX ORDER — ALBUTEROL SULFATE 90 UG/1
2 INHALANT RESPIRATORY (INHALATION) EVERY 6 HOURS PRN
Qty: 6.7 G | Refills: 5 | Status: SHIPPED | OUTPATIENT
Start: 2025-02-03

## 2025-02-03 NOTE — PROGRESS NOTES
Pre-operative Clearance  Name: Iona Toribio      : 1960      MRN: [id number]  Encounter Provider: Salas Marcial MD  Encounter Date: 2/3/2025   Encounter department: [facility]    Assessment & Plan  Preoperative examination  Patient was seen for a preoperative exam and clearance for tooth surgery that has not been scheduled yet.  She was seen by the dentist on 25 who requested preoperative clearance for removal of 6 maxillary teeth.  Patient is not on any anticoagulation.  Past medical history significant for SVTs (s/p ablation in ) last PCI done in  showed 40% blockage in RCA, ECHO showed normal systolic function with EF of 60%  Patient does not have any chest pain, shortness of breath, palpitations, headache, blurry vision, PND, nausea, vomiting, heartburn. She does indorse some pain in her bones but that is chronic. Compliant with medications. Can continue Metoprolol. EKG done in office WNL. She is medically optimized for the surgery provided her heart rate is monitored closely.       Orders:    POCT ECG    Chronic obstructive pulmonary disease, unspecified COPD type (HCC)    Orders:    albuterol (Proventil HFA) 90 mcg/act inhaler; Inhale 2 puffs every 6 (six) hours as needed for wheezing    Pre-operative Clearance:     Revised Cardiac Risk Index:  RCI RISK CLASS I (0 risk factors, risk of major cardiac complications approximately 0.5%)    Clearance:  Patient is medically optimized (CLEARED) for proposed surgery without any additional cardiac testing.       Patient has past medical history COPD, Trigger finger, acquired hypothyroidism, pure hypercholesterolemia,  COPD, HTN, SVT, Scoliosis, hospitalize x2 SVT and hypotension s/p Cath mild occlusion, Tobacco 40-50pack year history.  Last lung screening in  normal, no concerns of nodules.Patient's EKG done shows NSR, nonspecific  ST and T wave abnormality, heart rate 60 bpm.    Medication  Instructions:   - Avoid herbs or non-directed vitamins one week prior to surgery    - Avoid aspirin containing medications or non-steroidal anti-inflammatory drugs one week preceding surgery    - May take tylenol for pain up until the night before surgery    - Antidepressants: Continue to take this medication on your normal schedule.  - Antiepileptic meds: Continue to take this medication on your normal schedule.  - Antipsychotic meds: Continue to take this medication on your normal schedule.  - Beta blockers:  Continue to take this medication on your normal schedule.  - Hyperlipidemia meds: Continue to take this medication on your normal schedule.  - Thyroid meds: Continue to take this medication on your normal schedule.        BMI Counseling: Body mass index is 26.26 kg/m². The BMI is above normal. Nutrition recommendations include decreasing portion sizes, encouraging healthy choices of fruits and vegetables, decreasing fast food intake, limiting drinks that contain sugar and reducing intake of saturated and trans fat. Exercise recommendations include exercising 3-5 times per week. No pharmacotherapy was ordered. Rationale for BMI follow-up plan is due to patient being overweight or obese.       History of Present Illness     64-year-old female with past medical history as below came to the office for preoperative clearance for tooth surgery. She currently works as a  at [employer] in [place]. Uses a cane to walk if she has to walk at a distant, painful     Pre-op Exam  Surgery: tooth extraction  Anticipated Date of Surgery: Pending  Surgeon: Leon Bolton in [facility] in [place]    Patient is a 64 year old with the following medical conditions:    Trigger finger, acquired hypothyroidism, pure hypercholesterolemia,  smoker 1/2 pack a day, COPD, HTN, SVT, Scoliosis  Hospitalized x2 SVT and hypotension s/p Cath mild occlusion  Tobacco 40-50pack  year history  COPD - Severe - Minimal exacerbations. Overall symptoms have improved with decreased tobacco use and adherence to inhaler therapy.   -  Trelegy  - Levalbuterol/Ventolin PRN and prior to exercise     Tobacco Use disorder - Down to 3-4 cigarettes per day, patient will continue to try to decrease use  - Tobacco cessation counseling   - Lung Screening - no concerns of nodules    2023 silvano  Mid RCA lesion is 40% stenosed.  Mild calcification of proximal and mid RCA noted  ·  The left ventricular systolic function is normal.  EF is 60%.  There is no gradient across aortic valve  ·  Mild nonobstructive coronary artery disease with tortuous arteries of distal branches.    2024: ablation  2 episodes of svt in the past last in 2023             Previous history of bleeding disorders or clots?: No  Previous Anesthesia reaction?: No  Prolonged steroid use in the last 6 months?: No    Assessment of Cardiac Risk:   - Unstable or severe angina or MI in the last 6 weeks or history of stent placement in the last year?: No   - Decompensated heart failure (e.g. New onset heart failure, NYHA  Class IV heart failure, or worsening existing heart failure)?: No  - Significant arrhythmias such as high grade AV block, symptomatic ventricular arrhythmia, newly recognized ventricular tachycardia, supraventricular tachycardia with resting heart rate >100, or symptomatic bradycardia?: No  - Severe heart valve disease including aortic stenosis or symptomatic mitral stenosis?: No      Pre-operative Risk Factors:  Elevated-risk surgery: No    History of cerebrovascular disease: No    History of ischemic heart disease: No  Pre-operative treatment with insulin: No  Pre-operative creatinine >2 mg/dL: No    History of congestive heart failure: No    Duke Activity Status Index (DASI):   DASI Total Score: 18.95  METs: 5.1    Medications of Perioperative Concern:   Anti-platelet (aspirin, clopidogrel, ticagrelor, prasugrel, cilostazol,  dipyridamole), Anti-coagulants (Coumadin, Xarelto, Pradaxa, Eliquis, Lixiana), NSAIDs, Corticosteroids, Calcium channel blockers, Beta blockers, ACE inhibitors/ARBs, Metformin, Insulin, GLP agonists and SGLT2 inhibitors    Review of Systems   Constitutional:  Negative for chills and fever.   HENT:  Positive for sinus pressure. Negative for ear pain and sore throat.    Eyes:  Negative for pain and visual disturbance.   Respiratory:  Negative for cough and shortness of breath.    Cardiovascular:  Negative for chest pain and palpitations.   Gastrointestinal:  Negative for abdominal pain and vomiting.   Endocrine: Negative.    Genitourinary:  Negative for dysuria and hematuria.   Musculoskeletal:  Positive for back pain. Negative for arthralgias.   Skin:  Negative for color change and rash.   Allergic/Immunologic: Negative.    Neurological:  Negative for seizures and syncope.   All other systems reviewed and are negative.    Past Medical History   Past Medical History:   Diagnosis Date    Anxiety     Depression     Disease of thyroid gland     Elevated troponin 05/31/2023    History of alcohol abuse 05/31/2023    Scoliosis     Substance abuse (HCC)      Past Surgical History:   Procedure Laterality Date    BACK SURGERY  01/01/1973    scolosis rods    CARDIAC CATHETERIZATION N/A 07/25/2023    Procedure: Cardiac catheterization;  Surgeon: Lizet Ibarra MD;  Location: [facility];  Service: Cardiology    CARDIAC ELECTROPHYSIOLOGY STUDY AND ABLATION      COLONOSCOPY      ELBOW SURGERY  05/01/2018    UPPER GASTROINTESTINAL ENDOSCOPY      WISDOM TOOTH EXTRACTION       Family History   Problem Relation Age of Onset    Colon cancer Mother     Breast cancer Mother         UNKNOWN    Cancer Mother     Leukemia Father      Social History     Tobacco Use    Smoking status: Some Days     Current packs/day: 1.50     Average packs/day: 1.5 packs/day for 30.0 years (45.0 ttl pk-yrs)     Types: Cigarettes    Smokeless  "tobacco: Never    Tobacco comments:     Currently smokes 1 ppd 9/5/2023   Vaping Use    Vaping status: Some Days    Substances: Nicotine   Substance and Sexual Activity    Alcohol use: Not Currently     Comment: none 7/2018-went to rehab    Drug use: Not Currently     Comment: \"Could have been anything\" p/ pt 06/24/24    Sexual activity: Not on file     Current Outpatient Medications on File Prior to Visit   Medication Sig    atorvastatin (LIPITOR) 40 mg tablet Take 1 tablet (40 mg total) by mouth daily    Biotin 25188 MCG TABS Take by mouth    buPROPion (WELLBUTRIN SR) 150 mg 12 hr tablet Take 1 tablet (150 mg total) by mouth 2 (two) times a day    carbamide peroxide (DEBROX) 6.5 % otic solution Administer 5 drops into both ears 2 (two) times a day    cetirizine (ZyrTEC) 10 MG chewable tablet Chew 1 tablet (10 mg total) daily    docusate sodium (COLACE) 100 mg capsule Take 1 capsule (100 mg total) by mouth daily Pt verbalized she takes this once daily    ferrous sulfate 324 (65 Fe) mg Take 1 tablet (324 mg total) by mouth daily before breakfast    fluticasone (FLONASE) 50 mcg/act nasal spray 1 SPRAY INTO EACH NOSTRIL DAILY    fluticasone-umeclidinium-vilanterol (Trelegy Ellipta) 100-62.5-25 mcg/actuation inhaler Inhale 1 puff daily Rinse mouth after use.    gabapentin (NEURONTIN) 400 mg capsule Take 400 mg by mouth 3 (three) times a day    hydrOXYzine HCL (ATARAX) 50 mg tablet Take 50 mg by mouth 3 (three) times a day    levalbuterol (XOPENEX) 1.25 mg/0.5 mL nebulizer solution Take 0.5 mL (1.25 mg total) by nebulization every 8 (eight) hours as needed for wheezing    levalbuterol (XOPENEX) 1.25 mg/3 mL nebulizer solution     levothyroxine 88 mcg tablet Take 1 tablet (88 mcg total) by mouth daily in the early morning    metoprolol tartrate (LOPRESSOR) 25 mg tablet TAKE HALF TABLET EVERY 12 HOURS    multivitamin (THERAGRAN) TABS Take 1 tablet by mouth daily    pantoprazole (PROTONIX) 40 mg tablet TAKE 1 TABLET (40 MG " "TOTAL) BY MOUTH DAILY    QUEtiapine (SEROquel) 25 mg tablet Take 25 mg by mouth daily at bedtime as needed (for sleep)    vitamin B-12 (VITAMIN B-12) 1,000 mcg tablet Take 1 tablet (1,000 mcg total) by mouth daily    [DISCONTINUED] albuterol (Ventolin HFA) 90 mcg/act inhaler Inhale 2 puffs every 6 (six) hours as needed for wheezing    hydrOXYzine HCL (ATARAX) 25 mg tablet Take 25 mg by mouth 3 (three) times a day as needed (Patient not taking: Reported on 2/3/2025)    polyethylene glycol (MIRALAX) 17 g packet Take 17 g by mouth daily as needed (constipation) (Patient not taking: Reported on 9/23/2024)     Allergies   Allergen Reactions    Other Allergic Rhinitis     seasonal     Objective   /78 (BP Location: Right arm, Patient Position: Sitting, Cuff Size: Standard)   Pulse 66   Ht 4' 11\" (1.499 m)   Wt 59 kg (130 lb)   BMI 26.26 kg/m²     Physical Exam  Vitals and nursing note reviewed.   Constitutional:       General: She is not in acute distress.     Appearance: She is well-developed. She is not toxic-appearing.   HENT:      Head: Normocephalic and atraumatic.      Right Ear: External ear normal.      Left Ear: External ear normal.      Nose: Nose normal.      Mouth/Throat:      Mouth: Mucous membranes are moist.      Pharynx: Oropharynx is clear.   Eyes:      Extraocular Movements: Extraocular movements intact.      Conjunctiva/sclera: Conjunctivae normal.   Cardiovascular:      Rate and Rhythm: Normal rate and regular rhythm.      Pulses: Normal pulses.      Heart sounds: Normal heart sounds. No murmur heard.     No friction rub. No gallop.   Pulmonary:      Effort: Pulmonary effort is normal. No respiratory distress.      Breath sounds: Rales present. No wheezing (wheezes heard bilaterally on expiration) or rhonchi.   Abdominal:      Palpations: Abdomen is soft.      Tenderness: There is no abdominal tenderness. There is no right CVA tenderness or left CVA tenderness.   Musculoskeletal:         " General: No swelling or tenderness.      Cervical back: Normal range of motion and neck supple. No rigidity.      Right lower leg: No edema.      Left lower leg: No edema.   Skin:     General: Skin is warm and dry.      Capillary Refill: Capillary refill takes less than 2 seconds.      Findings: No bruising or rash.   Neurological:      Mental Status: She is alert and oriented to person, place, and time.      Cranial Nerves: No cranial nerve deficit.      Motor: No weakness.      Gait: Gait normal.   Psychiatric:         Mood and Affect: Mood normal.         Thought Content: Thought content normal.         Judgment: Judgment normal.           Salas Marcial MD

## 2025-02-03 NOTE — PATIENT INSTRUCTIONS
PLEASE STOP all over the counter medication (including supplements and vitamins) one week prior to surgery. Tylenol is safe to take if needed.     How to prepare for surgery:  Arrange for a responsible person to drive you to and from the hospital  Arrange for your children to be taken care of at home, children are not permitted in the surgical recovery areas of the hospital  Wear comfortable, casual clothing that is easy to take on and off, loose, button up or zippered tops will be the easiest to get in and out of after your operation  We recommend that you stop smoking four weeks prior to your surgery date, try not to smoke at least 24 hours prior to surgery  Do not drink alcohol in the 24 hours before your surgery  Do not eat or drink anything after midnight the night before your surgery. This includes candy, mints, lifesavers, chewing gum and water  Remove nail polish, including any artificial, gel, or acrylic nails, if possible, remove all jewelry including rings and body piercing jewelry  Leave all medicine, money, valuables, electronics, and jewelry at home     MAGNESIUM TYPES: Mag L threonate (for memory), Mag Malate ( cramps, nerve pain) Mag Glycinate (Stress, anxiety and sleep), Mag citrate: (constipation)    Magnesium is an essential mineral used for proper body function including stability of the electrical conduction system of the heart, nerve/brain function and musculoskeletal health.  Almost 1/2 of the [place] population does not get enough magnesium!  People especially at high risk of magnesium deficiency include:   1. Poor diet consisting mainly of processed food or fast food without adequate veggies and whole grains, which especially includes adolescents and elderly.   2. Chronic diseases such as diabetes, malabsorption syndromes  3. Alcohol regular use   4. Use of Proton pump inhibitors (PPIs) like omeprazole, pantoprazole, etc. Sometimes the PPI needs to be weaned off to correct Mg deficiency, even  if supplements are given.   5. Use of diuretics like hydrochlorothiazide (HCTZ), furosemide (Lasix), torsemide (Demadex, Soaanz), etc.       Symptoms and consequences of deficiency:   Mild magnesium deficiency often will not have any specific symptoms but over time can cause health problems  Moderate magnesium deficiency may cause loss of appetite, nausea, vomiting, fatigue, weakness, cramps or worsen anxiety.   Severe magnesium deficiency can lead to severe muscle spasms, seizures and irregular heart beat.    Restoring magnesium status in those who are deficient can help the followin. maintain Vit. D levels and lower risk of osteoporosis   2. Menstrual pains   3. Blood sugar control and insulin sensitivity in metabolic syndrome and diabetes   4. Normal heart rhythm and heart and vascular (blood vessel) health in general   5. Slightly lower BP in hypertensive patients with Mg deficiency   6. Anxiety, stress and mood   7. Brain and cognitive (thinking ability) function   8. Migraines   9. Reduce seizures in those with a seizure disorder      Good natural sources of magnesium include: veggies such as dark leafy greens, nuts and seeds, whole grains.   The recommended daily allowance (RDA) is as follows:  Adolescent females: 360 mg/day, males: 410 mg/d   Adult females: 310 - 320 mg/day, males:  400 - 420 mg/d    Doses of elemental Mg are generally about 200 to 400 mg a day (the higher dose only used to correct deficiency, not for maintenance in most cases). This will be better absorbed in divided doses, e.g. twice a day. In general keep any calcium/Mg supplement ratio about 2:1. Choose a water soluble form for better absorption, such as Mg chloride, citrate or an amino acid chelate such as bisglycinate, gluconate, others). Mg oxide, though cheaper, is less well absorbed and more prone to cause diarrhea.  Be sure to dose according to the elemental Mg content, not the total milligrams of the pill. For example, 500  mg magnesium gluconate = 27 mg of elemental magnesium.     For your clinical situation, please take 400 mg of elemental magnesium daily    Cautions: Taking too much magnesium per dose can lead to diarrhea or GI upset, since the body can only absorb a limited amount at a time.   Patients with severe kidney dysfunction (chronic kidney disease stages 4 or 5, or end stage renal disease) may more easily get too high magnesium levels from supplements, so they should be used cautiously in this setting.   Mg supplements could interfere with absorption of some medications, so take them at least an hour apart from other medications if this is a concern.   Unless correcting deficiency, do not generally take > 350 mg a day of elemental magnesium as a maintenance supplement, since this would be excessive and could lead to adverse effects.     Good magnesium supplements include:  [employer]'s Magnesium Citrate 100 mg elemental magnesium per cap (cost about 4 cents/cap). Usual dose to correct deficiency is one cap 4 times daily, preferably with meals or a snack. This will yield 400 mg of elemental magnesium daily. Take half this dose (i.e. 1 tab twice a day) for maintenance  2. [place] ([employer] brand) Magnesium Citrate. 100 mg elemental magnesium per cap. Usual dose to correct deficiency is one cap 4 times daily, preferably with meals or a snack.  This will yield 400 mg of elemental magnesium daily. Take half this dose (i.e. 1 tab twice a day) for maintenance   3. Solgar Magnesium citrate. 1 tab contains 210 mg of elemental magnesium. Usual dose is 1 cap twice a day to correct deficiency or once a day for maintenance.  4. [employer] Magnesium Chloride liquid. 1/2 teaspoon contains 66.5 mg of elemental magnesium. More expensive but a good choice if need a higher dose to correct deficiency, since it is less likely to cause diarrhea.

## 2025-02-09 DIAGNOSIS — I10 PRIMARY HYPERTENSION: Primary | ICD-10-CM

## 2025-02-10 RX ORDER — METOPROLOL TARTRATE 25 MG/1
TABLET, FILM COATED ORAL
Qty: 180 TABLET | Refills: 1 | Status: SHIPPED | OUTPATIENT
Start: 2025-02-10

## 2025-03-10 ENCOUNTER — OFFICE VISIT (OUTPATIENT)
Dept: OBGYN CLINIC | Facility: CLINIC | Age: 65
End: 2025-03-10
Payer: COMMERCIAL

## 2025-03-10 DIAGNOSIS — M65.312 TRIGGER THUMB OF LEFT HAND: ICD-10-CM

## 2025-03-10 DIAGNOSIS — M18.12 PRIMARY OSTEOARTHRITIS OF FIRST CARPOMETACARPAL JOINT OF LEFT HAND: Primary | ICD-10-CM

## 2025-03-10 PROCEDURE — 20550 NJX 1 TENDON SHEATH/LIGAMENT: CPT | Performed by: STUDENT IN AN ORGANIZED HEALTH CARE EDUCATION/TRAINING PROGRAM

## 2025-03-10 PROCEDURE — 99213 OFFICE O/P EST LOW 20 MIN: CPT | Performed by: STUDENT IN AN ORGANIZED HEALTH CARE EDUCATION/TRAINING PROGRAM

## 2025-03-10 RX ORDER — BETAMETHASONE SODIUM PHOSPHATE AND BETAMETHASONE ACETATE 3; 3 MG/ML; MG/ML
3 INJECTION, SUSPENSION INTRA-ARTICULAR; INTRALESIONAL; INTRAMUSCULAR; SOFT TISSUE
Status: COMPLETED | OUTPATIENT
Start: 2025-03-10 | End: 2025-03-10

## 2025-03-10 RX ORDER — LIDOCAINE HYDROCHLORIDE 10 MG/ML
1 INJECTION, SOLUTION INFILTRATION; PERINEURAL
Status: COMPLETED | OUTPATIENT
Start: 2025-03-10 | End: 2025-03-10

## 2025-03-10 RX ADMIN — BETAMETHASONE SODIUM PHOSPHATE AND BETAMETHASONE ACETATE 3 MG: 3; 3 INJECTION, SUSPENSION INTRA-ARTICULAR; INTRALESIONAL; INTRAMUSCULAR; SOFT TISSUE at 11:15

## 2025-03-10 RX ADMIN — LIDOCAINE HYDROCHLORIDE 1 ML: 10 INJECTION, SOLUTION INFILTRATION; PERINEURAL at 11:15

## 2025-03-10 NOTE — PROGRESS NOTES
ORTHOPAEDIC HAND, WRIST, AND ELBOW OFFICE  VISIT     ASSESSMENT/PLAN:    Iona Toribio is a 64 y.o. RHD female who presents with left trigger thumb s/p CSI x 2 most recent 3/10/25 and severe CMC osteoarthritis     The patient verbalized understanding of exam findings and treatment plan. We engaged in the shared decision-making process and treatment options were discussed at length with the patient. Surgical and conservative management discussed today along with risks and benefits.  A repeat left thumb trigger finger corticosteroid injection was offered, accepted, and administered in clinic. Procedure tolerated well, out lined below. Post injection protocol advised.   Patient understands that if triggering returns after this injection we would need to discuss possible surgical release. Patient expressed understanding.        Follow Up:  3 months if symptoms fail to improve or worsen      General Discussions:       Trigger FInger: The anatomy and physiology of trigger finger was discussed with the patient today in the office.  Edema and increased contact pressure within the flexor tendons at the A1 pulley can cause pain, crepitation, and limitation of function.  Treatment options include resting MP blocking splints to decrease edema, oral anti-inflammatory medications, home or formal therapy exercises, up to 2 steroid injections within the tendon sheath, or surgical release.  While majority of patients do respond to conservative treatment, up to 20% may require surgical release.       ____________________________________________________________________________________________________________________________________________      CHIEF COMPLAINT:  Left thumb pain     SUBJECTIVE:  Iona Toribio is a 64 y.o. female who presents for repeat evaluation of left thumb pain. Pain has been on going for some times, denies injury or trauma. Pain is localized to the A1 pulley, patient notes triggering through out the day. She has  "good relief from prior injection 3 months ago, but says it has recently returned. She is interested in trying one more steroid injection. Otherwise no new complaints.    I have personally reviewed all the relevant PMH, PSH, SH, FH, Medications and allergies      PAST MEDICAL HISTORY:  Past Medical History:   Diagnosis Date    Anxiety     Depression     Disease of thyroid gland     Elevated troponin 05/31/2023    History of alcohol abuse 05/31/2023    Scoliosis     Substance abuse (HCC)        PAST SURGICAL HISTORY:  Past Surgical History:   Procedure Laterality Date    BACK SURGERY  01/01/1973    scolosis rods    CARDIAC CATHETERIZATION N/A 07/25/2023    Procedure: Cardiac catheterization;  Surgeon: Lizet Ibarra MD;  Location: [facility];  Service: Cardiology    CARDIAC ELECTROPHYSIOLOGY STUDY AND ABLATION      COLONOSCOPY      ELBOW SURGERY  05/01/2018    UPPER GASTROINTESTINAL ENDOSCOPY      WISDOM TOOTH EXTRACTION         FAMILY HISTORY:  Family History   Problem Relation Age of Onset    Colon cancer Mother     Breast cancer Mother         UNKNOWN    Cancer Mother     Leukemia Father        SOCIAL HISTORY:  Social History     Tobacco Use    Smoking status: Some Days     Current packs/day: 1.50     Average packs/day: 1.5 packs/day for 30.0 years (45.0 ttl pk-yrs)     Types: Cigarettes    Smokeless tobacco: Never    Tobacco comments:     Currently smokes 1 ppd 9/5/2023   Vaping Use    Vaping status: Some Days    Substances: Nicotine   Substance Use Topics    Alcohol use: Not Currently     Comment: none 7/2018-went to rehab    Drug use: Not Currently     Comment: \"Could have been anything\" p/ pt 06/24/24       MEDICATIONS:    Current Outpatient Medications:     albuterol (Proventil HFA) 90 mcg/act inhaler, Inhale 2 puffs every 6 (six) hours as needed for wheezing, Disp: 6.7 g, Rfl: 5    atorvastatin (LIPITOR) 40 mg tablet, Take 1 tablet (40 mg total) by mouth daily, Disp: 90 tablet, Rfl: 3    Biotin " 31249 MCG TABS, Take by mouth, Disp: , Rfl:     buPROPion (WELLBUTRIN SR) 150 mg 12 hr tablet, Take 1 tablet (150 mg total) by mouth 2 (two) times a day, Disp: 180 tablet, Rfl: 0    carbamide peroxide (DEBROX) 6.5 % otic solution, Administer 5 drops into both ears 2 (two) times a day, Disp: 15 mL, Rfl: 0    cetirizine (ZyrTEC) 10 MG chewable tablet, Chew 1 tablet (10 mg total) daily, Disp: 90 tablet, Rfl: 2    docusate sodium (COLACE) 100 mg capsule, Take 1 capsule (100 mg total) by mouth daily Pt verbalized she takes this once daily, Disp: , Rfl:     ferrous sulfate 324 (65 Fe) mg, Take 1 tablet (324 mg total) by mouth daily before breakfast, Disp: 30 tablet, Rfl: 0    fluticasone (FLONASE) 50 mcg/act nasal spray, 1 SPRAY INTO EACH NOSTRIL DAILY, Disp: 16 g, Rfl: 3    fluticasone-umeclidinium-vilanterol (Trelegy Ellipta) 100-62.5-25 mcg/actuation inhaler, Inhale 1 puff daily Rinse mouth after use., Disp: 180 blister, Rfl: 9    gabapentin (NEURONTIN) 400 mg capsule, Take 400 mg by mouth 3 (three) times a day, Disp: , Rfl:     hydrOXYzine HCL (ATARAX) 50 mg tablet, Take 50 mg by mouth 3 (three) times a day, Disp: , Rfl:     levalbuterol (XOPENEX) 1.25 mg/0.5 mL nebulizer solution, Take 0.5 mL (1.25 mg total) by nebulization every 8 (eight) hours as needed for wheezing, Disp: 180 mL, Rfl: 5    levalbuterol (XOPENEX) 1.25 mg/3 mL nebulizer solution, , Disp: , Rfl:     levothyroxine 88 mcg tablet, Take 1 tablet (88 mcg total) by mouth daily in the early morning, Disp: 90 tablet, Rfl: 0    metoprolol tartrate (LOPRESSOR) 25 mg tablet, TAKE HALF TABLET EVERY 12 HOURS, Disp: 180 tablet, Rfl: 1    multivitamin (THERAGRAN) TABS, Take 1 tablet by mouth daily, Disp: , Rfl:     pantoprazole (PROTONIX) 40 mg tablet, TAKE 1 TABLET (40 MG TOTAL) BY MOUTH DAILY, Disp: 90 tablet, Rfl: 1    QUEtiapine (SEROquel) 25 mg tablet, Take 25 mg by mouth daily at bedtime as needed (for sleep), Disp: , Rfl:     vitamin B-12 (VITAMIN B-12)  1,000 mcg tablet, Take 1 tablet (1,000 mcg total) by mouth daily, Disp: 30 tablet, Rfl: 0    hydrOXYzine HCL (ATARAX) 25 mg tablet, Take 25 mg by mouth 3 (three) times a day as needed (Patient not taking: Reported on 2/3/2025), Disp: , Rfl:     polyethylene glycol (MIRALAX) 17 g packet, Take 17 g by mouth daily as needed (constipation) (Patient not taking: Reported on 9/23/2024), Disp: 20 each, Rfl: 0    ALLERGIES:  Allergies   Allergen Reactions    Other Allergic Rhinitis     seasonal           REVIEW OF SYSTEMS:  Pertinent items are noted in HPI.  A comprehensive review of systems was negative.    VITALS:  There were no vitals filed for this visit.      LABS:  HgA1c:   Lab Results   Component Value Date    HGBA1C 5.3 05/31/2023     BMP:   Lab Results   Component Value Date    CALCIUM 9.2 07/27/2023    K 3.6 07/27/2023    CO2 30 07/27/2023    CL 99 07/27/2023    BUN 15 07/27/2023    CREATININE 0.50 (L) 07/27/2023       _____________________________________________________  PHYSICAL EXAMINATION:  General: well developed and well nourished, alert, oriented times 3, and appears comfortable  Psychiatric: Normal  HEENT: Normocephalic, Atraumatic Trachea Midline, No torticollis  Pulmonary: No audible wheezing or respiratory distress   Abdomen/GI: Non tender, non distended   Cardiovascular: No pitting edema, 2+ radial pulse   Skin: No masses, erythema, lacerations, fluctation, ulcerations  Neurovascular: Sensation Intact to the Median, Ulnar, Radial Nerve, Motor Intact to the Median, Ulnar, Radial Nerve, and Pulses Intact  Musculoskeletal: Normal, except as noted in detailed exam and in HPI.        FOCUSED MUSCULOSKELETAL EXAMINATION:    Left Upper Extremity  Inspection: skin intact, no notable deformity   Palpation: TTP A1 pulley thumb, CMC   Neurologic: 5/5 elbow flexion, 5/5 elbow extension, 5/5 wrist extension, 5/5 wrist flexion, 5/5 finger flexion, 5/5 finger extension, 5/5 FPL, 5/5 EPL, 5/5 APB, 5/5 intrinsics,  sensation intact to median, radial, and ulnar nerve distributions  Vascular: Palpable radial pulse, brisk cap refill <2sec, hand warm and well perfused  MSK:   + CMC grind   TTP with palpable nodule in the thumb flexor tendon local to A1 Pulley  Reproducible triggering on exam    ___________________________________________________  STUDIES REVIEWED:  Xrays of the left hand were obtained on 10/14/24 were independently reviewed which demonstrates severe CMC osteoarthritis, degenerative changes at the triscaphe joint.    LABS REVIEWED:    HgA1c:   Lab Results   Component Value Date    HGBA1C 5.3 05/31/2023     BMP:   Lab Results   Component Value Date    CALCIUM 9.2 07/27/2023    K 3.6 07/27/2023    CO2 30 07/27/2023    CL 99 07/27/2023    BUN 15 07/27/2023    CREATININE 0.50 (L) 07/27/2023               PROCEDURES PERFORMED:  Hand/upper extremity injection: L thumb A1  Universal Protocol:  procedure performed by consultantConsent: Verbal consent obtained.  Risks and benefits: risks, benefits and alternatives were discussed  Consent given by: patient  Timeout called at: 3/10/2025 10:37 AM.  Patient understanding: patient states understanding of the procedure being performed  Patient identity confirmed: verbally with patient  Supporting Documentation  Indications: diagnostic, tendon swelling and pain   Procedure Details  Condition:trigger finger Location: thumb - L thumb A1   Preparation: Patient was prepped and draped in the usual sterile fashion  Needle size: 25 G  Ultrasound guidance: no  Medications administered: 1 mL lidocaine 1 %; 3 mg betamethasone acetate-betamethasone sodium phosphate 6 (3-3) mg/mL  Patient tolerance: patient tolerated the procedure well with no immediate complications  Dressing:  Sterile dressing applied             _____________________________________________________      Scribe Attestation      I,:   am acting as a scribe while in the presence of the attending physician.:       I,:    personally performed the services described in this documentation    as scribed in my presence.:               I agree with the history, physical examination, assessment and plan of care as documented above.    Hardik Dixon M.D.  Attending, Orthopaedic Surgery  Hand, Wrist, and Elbow Surgery  [facility]

## 2025-03-11 DIAGNOSIS — E78.5 HLD (HYPERLIPIDEMIA): ICD-10-CM

## 2025-03-11 RX ORDER — ATORVASTATIN CALCIUM 40 MG/1
40 TABLET, FILM COATED ORAL DAILY
Qty: 90 TABLET | Refills: 3 | Status: SHIPPED | OUTPATIENT
Start: 2025-03-11

## 2025-03-12 DIAGNOSIS — J44.9 CHRONIC OBSTRUCTIVE PULMONARY DISEASE, UNSPECIFIED COPD TYPE (HCC): ICD-10-CM

## 2025-03-12 RX ORDER — ALBUTEROL SULFATE 90 UG/1
2 INHALANT RESPIRATORY (INHALATION) EVERY 6 HOURS PRN
Qty: 6.7 G | Refills: 5 | Status: SHIPPED | OUTPATIENT
Start: 2025-03-12

## 2025-03-12 NOTE — TELEPHONE ENCOUNTER
VM left on Rx line:    Hi, this is Iona Toribio. Catarino, date of birth August 1st, 1960 is for albuterol sulfa. The inhaler thing for emergency inhaler it goes to [facility] [phone] and my number is [phone]. Thank you. Bye bye.

## 2025-04-12 DIAGNOSIS — Z71.6 ENCOUNTER FOR TOBACCO USE CESSATION COUNSELING: ICD-10-CM

## 2025-04-12 NOTE — TELEPHONE ENCOUNTER
Medication Refill Request       Medication: buPROPion (WELLBUTRIN SR) 150 mg 12 hr tablet     Dose/Frequency: kameron 1 tablet (150 mg total) by mouth 2 (two) times a day,     Quantity: 180 tablet     Pharmacy:  [place] [facility] [address]     Office:   [x] PCP/Provider -   [] Specialty/Provider -     Does the patient have enough for 3 days?   [] Yes   [x] No - Send as HP to POD    Is the patient completely out of the medication or does not have enough until the next business day?  [] Yes - send to [facility]  [x] No - Send as HP to POD

## 2025-04-14 DIAGNOSIS — I10 PRIMARY HYPERTENSION: ICD-10-CM

## 2025-04-14 RX ORDER — BUPROPION HYDROCHLORIDE 150 MG/1
150 TABLET, EXTENDED RELEASE ORAL 2 TIMES DAILY
Qty: 120 TABLET | Refills: 0 | Status: SHIPPED | OUTPATIENT
Start: 2025-04-14

## 2025-04-14 RX ORDER — METOPROLOL TARTRATE 25 MG/1
12.5 TABLET, FILM COATED ORAL EVERY 12 HOURS SCHEDULED
Qty: 90 TABLET | Refills: 2 | Status: SHIPPED | OUTPATIENT
Start: 2025-04-14

## 2025-04-14 NOTE — TELEPHONE ENCOUNTER
VM left on Rx line:    Hi, my name is Iona Toribio. My YOB: 1960. I need a refill on Metoprolol Tartrate 25 milligrams, quantity 60 to go to [facility]. Their number is [phone] and my phone number is [phone]. Thank you. Bye bye.

## 2025-04-17 ENCOUNTER — OFFICE VISIT (OUTPATIENT)
Dept: PULMONOLOGY | Facility: MEDICAL CENTER | Age: 65
End: 2025-04-17
Payer: COMMERCIAL

## 2025-04-17 VITALS
TEMPERATURE: 98 F | HEIGHT: 59 IN | WEIGHT: 131 LBS | OXYGEN SATURATION: 94 % | SYSTOLIC BLOOD PRESSURE: 122 MMHG | RESPIRATION RATE: 12 BRPM | DIASTOLIC BLOOD PRESSURE: 68 MMHG | HEART RATE: 59 BPM | BODY MASS INDEX: 26.41 KG/M2

## 2025-04-17 DIAGNOSIS — F17.219 CIGARETTE NICOTINE DEPENDENCE WITH NICOTINE-INDUCED DISORDER: ICD-10-CM

## 2025-04-17 DIAGNOSIS — J96.11 CHRONIC RESPIRATORY FAILURE WITH HYPOXIA (HCC): ICD-10-CM

## 2025-04-17 DIAGNOSIS — J43.1 PANLOBULAR EMPHYSEMA (HCC): Primary | ICD-10-CM

## 2025-04-17 PROBLEM — Z99.81 OXYGEN DEPENDENT: Status: RESOLVED | Noted: 2023-12-13 | Resolved: 2025-04-17

## 2025-04-17 PROBLEM — IMO0001 SMOKING: Status: RESOLVED | Noted: 2023-12-13 | Resolved: 2025-04-17

## 2025-04-17 PROCEDURE — 94618 PULMONARY STRESS TESTING: CPT | Performed by: NURSE PRACTITIONER

## 2025-04-17 PROCEDURE — 99214 OFFICE O/P EST MOD 30 MIN: CPT | Performed by: NURSE PRACTITIONER

## 2025-04-17 RX ORDER — ALBUTEROL SULFATE 90 MCG
2 HFA AEROSOL WITH ADAPTER (GRAM) INHALATION EVERY 6 HOURS PRN
Qty: 18 G | Refills: 5 | Status: SHIPPED | OUTPATIENT
Start: 2025-04-17

## 2025-04-17 NOTE — ASSESSMENT & PLAN NOTE
Continue with Trelegy and albuterol MDI or nebulized Xopenex as needed.  I did send a new prescription for Ventolin to her pharmacy today stating to dispense as written due to decreased effect of ProAir.  She was referred to pulmonary rehab in the past, but reports she lost the paperwork.  I did replace the referral order today and she is agreeable to going.  Orders:    Proventil  (90 Base) MCG/ACT inhaler; Inhale 2 puffs every 6 (six) hours as needed for wheezing    Ambulatory Referral to Pulmonary Rehabilitation; Future    POCT Oxygen Titration

## 2025-04-17 NOTE — PROGRESS NOTES
Follow-up  Visit - Pulmonary Medicine   Name: Iona Toribio      : 1960      MRN: [id number]  Encounter Provider: BERNABE Hernandez  Encounter Date: 2025   Encounter department: [facility]  :  Assessment & Plan  Panlobular emphysema (HCC)  Continue with Trelegy and albuterol MDI or nebulized Xopenex as needed.  I did send a new prescription for Ventolin to her pharmacy today stating to dispense as written due to decreased effect of ProAir.  She was referred to pulmonary rehab in the past, but reports she lost the paperwork.  I did replace the referral order today and she is agreeable to going.  Orders:    Proventil  (90 Base) MCG/ACT inhaler; Inhale 2 puffs every 6 (six) hours as needed for wheezing    Ambulatory Referral to Pulmonary Rehabilitation; Future    POCT Oxygen Titration    Chronic respiratory failure with hypoxia (HCC)  Walk test was done in the office today and confirmed her need for 2 L of oxygen with exertion and a chest with goal saturations greater than or equal to 89%.  Orders:    Ambulatory Referral to Pulmonary Rehabilitation; Future    POCT Oxygen Titration    Cigarette nicotine dependence with nicotine-induced disorder  She will continue to work toward complete cessation and I congratulated her on her efforts.    I discussed with her that she is a candidate for lung cancer CT screening.     The following Shared Decision-Making points were covered:  Benefits of screening were discussed, including the rates of reduction in death from lung cancer and other causes.  Harms of screening were reviewed, including false positive tests, radiation exposure levels, risks of invasive procedures, risks of complications of screening, and risk of overdiagnosis.  I counseled on the importance of adherence to annual lung cancer LDCT screening, impact of co-morbidities, and ability or willingness to undergo diagnosis and treatment.  I counseled on the  importance of maintaining abstinence as a former smoker or was counseled on the importance of smoking cessation if a current smoker    Review of Eligibility Criteria: She meets all of the criteria for Lung Cancer Screening.   She is 64 y.o.   She has 20 pack year tobacco history and is a current smoker or has quit within the past 15 years  She presents no signs or symptoms of lung cancer    After discussion, the patient decided to elect lung cancer screening.  She is due in September 2025 and this was ordered today.    Orders:    CT lung screening program; Future      Return in about 6 months (around 10/17/2025), or if symptoms worsen or fail to improve.    All of Iona's questions were answered prior to leaving the office today. She will follow-up in six months or sooner should the need arise. She is aware to call our office with any further questions or concerns.      History of Present Illness   Iona Toribio is a 64 y.o. female who presents in follow-up.  Iona reports that overall she is doing well.  She hopes to try to be more active.  She feels that her shortness of breath remains stable since last visit.  She has not had frequent exacerbations.  She was changed from Ventolin to ProAir and feels that does not work as well.  She uses her nebulizer with Xopenex occasionally and she is also on Trelegy daily.  She wears 2 L of oxygen with exertion and at bedtime.  No other new symptoms.  She feels she is doing well.  She reports that she has only smoked 4 cigarettes in the last month.    Review of Systems   Constitutional:  Negative for appetite change and fever.   HENT:  Positive for postnasal drip. Negative for ear pain.    Cardiovascular:  Negative for chest pain.   Musculoskeletal:  Positive for myalgias.   Neurological:  Negative for headaches.   All other systems reviewed and are negative.  Aside from what is mentioned in the HPI, ROS is otherwise negative.     Medical History Reviewed by provider this  "encounter:  Tobacco  Allergies  Meds  Problems  Med Hx  Surg Hx  Fam Hx     .    Objective   /68 (BP Location: Left arm, Patient Position: Sitting, Cuff Size: Standard)   Pulse 59   Temp 98 °F (36.7 °C) (Temporal)   Resp 12   Ht 4' 11\" (1.499 m)   Wt 59.4 kg (131 lb)   SpO2 94%   BMI 26.46 kg/m²     Physical Exam  Vitals reviewed.   Constitutional:       General: She is not in acute distress.     Appearance: She is well-developed. She is not toxic-appearing or diaphoretic.   HENT:      Head: Normocephalic and atraumatic.   Eyes:      General: No scleral icterus.     Extraocular Movements: Extraocular movements intact.   Neck:      Trachea: No tracheal deviation.   Cardiovascular:      Rate and Rhythm: Normal rate and regular rhythm.      Heart sounds: S1 normal and S2 normal. No murmur heard.     No friction rub. No gallop.   Pulmonary:      Effort: Pulmonary effort is normal. No tachypnea, accessory muscle usage or respiratory distress.      Breath sounds: Normal breath sounds. No stridor. No decreased breath sounds, wheezing, rhonchi or rales.   Chest:      Chest wall: No tenderness.   Abdominal:      General: Bowel sounds are normal. There is no distension.      Palpations: Abdomen is soft.      Tenderness: There is no abdominal tenderness.   Musculoskeletal:      Cervical back: Neck supple.      Right lower leg: No edema.      Left lower leg: No edema.   Skin:     General: Skin is warm and dry.      Findings: No rash.   Neurological:      Mental Status: She is alert and oriented to person, place, and time.      GCS: GCS eye subscore is 4. GCS verbal subscore is 5. GCS motor subscore is 6.   Psychiatric:         Speech: Speech normal.         Behavior: Behavior normal. Behavior is cooperative.       Diagnostic Data:  Oxygen titration started on room air with pulse ox 91% and heart rate 69 bpm.  She ambulated for 1 minute and desaturated to 87% and required 2 L of supplemental oxygen to " maintain saturations 90 to 96%.      BERNABE Hernandez

## 2025-04-17 NOTE — ASSESSMENT & PLAN NOTE
She will continue to work toward complete cessation and I congratulated her on her efforts.    I discussed with her that she is a candidate for lung cancer CT screening.     The following Shared Decision-Making points were covered:  Benefits of screening were discussed, including the rates of reduction in death from lung cancer and other causes.  Harms of screening were reviewed, including false positive tests, radiation exposure levels, risks of invasive procedures, risks of complications of screening, and risk of overdiagnosis.  I counseled on the importance of adherence to annual lung cancer LDCT screening, impact of co-morbidities, and ability or willingness to undergo diagnosis and treatment.  I counseled on the importance of maintaining abstinence as a former smoker or was counseled on the importance of smoking cessation if a current smoker    Review of Eligibility Criteria: She meets all of the criteria for Lung Cancer Screening.   She is 64 y.o.   She has 20 pack year tobacco history and is a current smoker or has quit within the past 15 years  She presents no signs or symptoms of lung cancer    After discussion, the patient decided to elect lung cancer screening.  She is due in September 2025 and this was ordered today.    Orders:    CT lung screening program; Future

## 2025-04-17 NOTE — ASSESSMENT & PLAN NOTE
Walk test was done in the office today and confirmed her need for 2 L of oxygen with exertion and a chest with goal saturations greater than or equal to 89%.  Orders:    Ambulatory Referral to Pulmonary Rehabilitation; Future    POCT Oxygen Titration

## 2025-04-27 DIAGNOSIS — E03.9 ACQUIRED HYPOTHYROIDISM: ICD-10-CM

## 2025-04-27 DIAGNOSIS — K29.70 GASTRITIS WITHOUT BLEEDING, UNSPECIFIED CHRONICITY, UNSPECIFIED GASTRITIS TYPE: ICD-10-CM

## 2025-04-27 NOTE — TELEPHONE ENCOUNTER
Medication Refill Request       Medication: Levothyroxine    Dose/Frequency: 88mcg daily    Quantity: 90    Pharmacy: [facility]    Office:   [x] PCP/Provider -   [] Specialty/Provider -     Does the patient have enough for 3 days?   [x] Yes   [] No - Send as HP to POD

## 2025-04-28 RX ORDER — LEVOTHYROXINE SODIUM 88 UG/1
88 TABLET ORAL
Qty: 90 TABLET | Refills: 1 | Status: SHIPPED | OUTPATIENT
Start: 2025-04-28

## 2025-04-29 RX ORDER — PANTOPRAZOLE SODIUM 40 MG/1
40 TABLET, DELAYED RELEASE ORAL DAILY
Qty: 90 TABLET | Refills: 1 | Status: SHIPPED | OUTPATIENT
Start: 2025-04-29

## 2025-05-05 ENCOUNTER — TELEPHONE (OUTPATIENT)
Dept: PULMONOLOGY | Facility: CLINIC | Age: 65
End: 2025-05-05

## 2025-05-21 ENCOUNTER — TELEPHONE (OUTPATIENT)
Dept: CARDIAC REHAB | Facility: CLINIC | Age: 65
End: 2025-05-21

## 2025-05-22 ENCOUNTER — CLINICAL SUPPORT (OUTPATIENT)
Dept: PULMONOLOGY | Facility: CLINIC | Age: 65
End: 2025-05-22
Attending: NURSE PRACTITIONER
Payer: COMMERCIAL

## 2025-05-22 DIAGNOSIS — J43.1 PANLOBULAR EMPHYSEMA (HCC): Primary | ICD-10-CM

## 2025-05-22 NOTE — PROGRESS NOTES
Pulmonary Rehabilitation   Assessment and Individualized Treatment Plan  INITIAL       Today's date: May 22, 2025  # of Exercise Sessions Completed: 1  Patient name: Iona Toribio     : 1960       MRN: [id number]  Referring Physician: Hayder Rondon DO  Pulmonologist: Hayder Rondon DO  Provider: Piero  Clinician: Eva Campos RN     Dx:    Encounter Diagnosis   Name Primary?    Panlobular emphysema (HCC) Yes     Date of onset: 2019      Treatment is tailored to this patient's individual needs.  The ITP was reviewed with the patient and all questions were answered to their satisfaction.  Additional ITP documentation can be found electronically including daily and monthly exercise summaries, daily session notes with ECG summaries, education notes, daily medication reconciliation, and daily physician supervision.      INITIAL EVALUATION SUMMARY DATE:  2025    Patient's subjective report of progress/symptoms: shortness of breath carrying groceries up stairs, chronic discomfort of back, upper and lower extremities  Home exercise/ADLs: no home exercise/ Able to complete ADL's via self  Clinical Comments: Complete initial pulmonary rehabilitation evaluation, explained [place], how the lungs function, emphysema, heart healthy diet, exercise, RPE, RPD, and silver cloud program. Completed 6 minute walk test.     Initial Fitness Assessment: 6MWT:  Resting:    Resting:  BP: 130/70  HR: 60  SpO2: 93  Dyspnea: 0  Exercise:  BP: 144/78  HR: 86  SpO2: 87-93%  Dyspnea: 4-5  Distance walked:  310ft  METs:  1.45  ECG Summary: NSR  Symptoms: chronic discomfort of left knee, SOLORIO, 5 rest stops      ASSESSMENT      Medical History:   Past Medical History[1]    Family History:  Family History[2]    Allergies:   Other    Current Medications:   Current Medications[3]    Physical Limitations: chronic discomfort of back, upper and lower extremities, SOLORIO    Fall Risk: High   Comments: Denies a fall in the past 6  months, abnormal gait    Cultural needs: none    PFT:  Yes     FEV1/FVC ratio of 60%   FEV1 of 35% predicted  very severeobstruction.    Medication compliance: Yes  Comments: Pt reports to be compliant with medications      Pulmonary Disease Risk Factors:  smoking    Sleep Disorders:   none      EXERCISE ASSESSMENT:      Current Functional Status:  Occupation: part time job [email or web address]  Recreation/Physical activity: sedentary, works at center,   ADL’s:able to perform self-care  [place]: able to perform self-care  Exercise:  none  Home exercise equipment: none      SMART Exercise Goals:   reduced score on CAT, improved 6MWT distance, reduced dyspnea during exercise, improved exercise tolerance based on peak METs tolerated in pulmonary rehab exercise session, and SpO2 >88% during exercise    Patient Specific EXERCISE GOALS:     reduced dependence on supplemental O2, attend pulmonary rehab regularly, decrease sitting time at home, begin a consistent exercise regimen , reduced pulmonary related hospital readmissions , decreased rest needed with physical activity/exercise, increased energy, and increase energy to use stairs without fatigue    PSYCHOSOCIAL ASSESSMENT:    Date of last assessment: 5/22/2025  Depression screening:  PHQ-9 = 4    Interpretation:  1-4 = Minimal Depression  Anxiety screening:  ABRAHAM-7 = 3    Interpretation: 0-4  = Not anxious    Pt self-report of depression and anxiety   Patient has a history of depression  Patient has a history of anxiety   Reports sufficient emotional support   compliant with medical therapy for depression/anxiety  attends regular visits with therapist  Provided contact information for [facility]  Provided information on silver cloud program    Self-reported stress level:  1  Stress Management: keep a positive mindset, regular visits with a therapist, and keep busy work, phone games    Quality of Life Screen:  (Higher score indicates disease  "impact on QOL)  [facility] COOP score: 26/40      CAT: 12/40      Shortness of breath questionnaire: 24/120    Social Support:   friends  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       NUTRITION ASSESSMENT:    Initial Weight:  131  Current Weight: 131    Height:   Ht Readings from Last 1 Encounters:   04/17/25 4' 11\" (1.499 m)       Rate Your Plate Score: 54/81    Self-reported Current Dietary Habits:  States she will try to improve her nutrition,  decrease sodium in her diet    ETOH:   Social History     Substance and Sexual Activity   Alcohol Use Not Currently    Comment: none 7/2018-went to rehab       SMART Goals:   rarely eat processed meats or eat low fat processed meats    Patient Specific NUTRITION GOALS:    increase water intake to reduce/thin mucus production decrease consumption of processed foods and food portions. Consume healthier snacks      OTHER CORE COMPONENT ASSESSMENT:    Hospitalizations in the past year: none    Oxygen needs:   Rest:  room air  Exercise/physical activity:  room air and supplemental O2 via nasal cannula @ 2L/min   Sleep:   supplemental O2 via nasal cannula @ 2L/min     Does Pt monitor home SpO2? yes   Average SpO2 at rest:  91%   Average SpO2 with ADLs/physical activity:  87%      Use of Rescue Inhaler: Yes:  1 times per day    Use of Maintenance Inhaler: Yes:  1  times per day    Use of Nebulizer Treatments:  Yes:  1 times per day    Patient practices breathing techniques at home:  Yes and Reviewed with patient on:  5/22/2025    CAD Risk " Factors:  Cholesterol: Yes  HTN: Yes  DM: No  Obesity: No     Smoking: Has reduced number of cigarettes since event but continues to smoke 4 cigs/day    SMART Core Component Goals:   consistent, controlled resting BP < 130/80, medication compliance, reduced number of cigarettes per day, quit smoking, and demonstrate pursed lipped breathing    Patient Specific CORE COMPONENT GOALS:    reduced dietary sodium <2000mg, medication compliance, set a target quit date, compliance with supplemental oxygen, and monitor home pulse oximetry      INDIVIDUALIZED TREATMENT PLAN      EXERCISE GOALS AND PLAN    PHYSCIAN PRESCRIBED EXERCISE    Current Aerobic Exercise Prescription       Frequency: 3 days/week   Supplement with home exercise 2+ days/wk as tolerated        Minutes: 30-40         METS: 1.5-3.0              SpO2: >88%              RPD: 4-6                      HR: Intensity based on RPE 4-6    RPE: 4-5         Modalities: UBE, NuStep, Recumbent bike, and Room walking      Aerobic Exercise Prescription Plan for Progression:    Frequency: 3 days/week    Supplement with home exercise 2+ days/wk as tolerated       Minutes: 30-40        METS: 1.5-3.0              SpO2: >88%              RPD: 4-6                      HR:Intensity based on RPE 4-6    RPE: 4-5        Modalities: UBE, NuStep, Recumbent bike, and Room walking        Supplemental Oxygen Needs with Exercise:  room air, supplemental O2 2L/min via nasal canula, and will titrate O2 to maintain SpO2 >88%.    Strength trainin-3 days / week  12-15 repetitions  1-2 sets per modality   Will be added following 2-3 weeks of monitored exercise sessions   Modalities: Arm Curl, Sit to Stands, and Shoulder Shrugs    Education: pursed lipped breathing, relaxation breathing, benefits of exercise for pulmonary disease, RPE scale, RPD scale, O2 saturation monitoring, appropriate O2 response to exercise, energy conservation, and education class: Exercise For The Pulmonary  Patient    Progress toward Exercise Goals:    Reviewed Pt goals and determined plan of care, Will continue to educate and progress as tolerated.    Exercise Plan:  Titrate supplemental oxygen as needed to maintain SpO2>88% with exercise, learn to conserve energy with ADLs , utilize PLB with physical activity, begin a home exercise program , and After discharge patient will continue to follow a regular exercise regimen with the goal of a minimum of 150 minutes per week of moderate intensity exercise.      Readiness to change: Preparation:  (Getting ready to change)       NUTRITION GOALS AND PLAN    Progress toward Nutritional goals:    Reviewed Pt goals and determined plan of care, Will continue to educate and progress as tolerated.    Nutrition Plan: reduce intake and /or  rarely eat processed meats  and healthier snacks and decrease food portions    SMART goals are based Rate Your Plate Dietary Self-Assessment. These are the areas in which the patient could score higher on the assessment.  Goals include recommendations for a heart healthy diet based on American Heart Association.    Nutrition Education: low sodium diet  maintaining hydration  nutrition for  lipid management  group class: healthy eating for managing pulmonary illness    Readiness to change: Contemplation:  (Acknowledging that there is a problem but not yet ready or sure of wanting to make a change)      PSYCHOSOCIAL GOALS AND PLAN    Information to begin using [facility] was provided as well as contact information for counseling through  Behavioral Health.    Progress toward Psychosocial goals:    Reviewed Pt goals and determined plan of care, Will continue to educate and progress as tolerated.    Psychosocial Plan: Enroll in [facility], Practice relaxation techniques, Exercise, Keep a positive mindset, and talk with friends, therapist    Psychosocial Education: benefits of enrolling in [facility], depression and pulmonary disease, tools to  manage fear/anxiety related to becoming SOB, and benefits of mental health counseling    Readiness to change: Preparation:  (Getting ready to change)       OTHER CORE COMPONENTS GOALS AND PLAN    Tobacco Use Intervention:     Brief counseling by cardiac rehab professional  Date: 5/22/2025  Discussed barriers to quitting and steps to work toward a quit date  [place] Quit Line 1-800-QUIT-NOW  [place]  Pt continues to smoke 4 cigarettes a day     Oxygen Goal: Maintain SpO2>88% during exercise or as advised by pulmonolgist    Progress toward Core Component goals:    Reviewed Pt goals and determined plan of care, Will continue to educate and progress as tolerated.    Core Component Plan: medication compliance, call free Quitline - 1-800-QUIT-NOW (016-7383), set target quit date for smoking, reduce number of cigarettes per day, complete abstention from smoking, avoid processed foods, engage in regular exercise, check labels for sodium content, and group class:  Pulmonary Medications    Core Component Education:  pathophysiology of pulmonary disease, dangers of smoking, tobacco triggers, nebulizer use, bronchodilators, bronchial hygiene, and education: Pulmonary Anatomy and Physiology    Readiness to change: Preparation:  (Getting ready to change)          [1]   Past Medical History:  Diagnosis Date    Anxiety     Depression     Disease of thyroid gland     Elevated troponin 05/31/2023    History of alcohol abuse 05/31/2023    Scoliosis     Substance abuse (HCC)    [2]   Family History  Problem Relation Name Age of Onset    Colon cancer Mother Sonia Becerra     Breast cancer Mother Sonia Becerra         UNKNOWN    Cancer Mother Sonia Becerra     Leukemia Father     [3]   Current Outpatient Medications   Medication Sig Dispense Refill    atorvastatin (LIPITOR) 40 mg tablet TAKE ONE TABLET BY MOUTH DAILY 90 tablet 3    Biotin 37647 MCG TABS Take by mouth      buPROPion (WELLBUTRIN SR) 150 mg 12 hr tablet Take 1  tablet (150 mg total) by mouth 2 (two) times a day 120 tablet 0    carbamide peroxide (DEBROX) 6.5 % otic solution Administer 5 drops into both ears 2 (two) times a day 15 mL 0    cetirizine (ZyrTEC) 10 MG chewable tablet Chew 1 tablet (10 mg total) daily 90 tablet 2    docusate sodium (COLACE) 100 mg capsule Take 1 capsule (100 mg total) by mouth daily Pt verbalized she takes this once daily      ferrous sulfate 324 (65 Fe) mg Take 1 tablet (324 mg total) by mouth daily before breakfast 30 tablet 0    fluticasone (FLONASE) 50 mcg/act nasal spray 1 SPRAY INTO EACH NOSTRIL DAILY 16 g 3    fluticasone-umeclidinium-vilanterol (Trelegy Ellipta) 100-62.5-25 mcg/actuation inhaler Inhale 1 puff daily Rinse mouth after use. 180 blister 9    gabapentin (NEURONTIN) 400 mg capsule Take 400 mg by mouth 3 (three) times a day      hydrOXYzine HCL (ATARAX) 25 mg tablet Take 25 mg by mouth 3 (three) times a day as needed (Patient not taking: Reported on 2/3/2025)      hydrOXYzine HCL (ATARAX) 50 mg tablet Take 50 mg by mouth 3 (three) times a day      levalbuterol (XOPENEX) 1.25 mg/0.5 mL nebulizer solution Take 0.5 mL (1.25 mg total) by nebulization every 8 (eight) hours as needed for wheezing 180 mL 5    levothyroxine 88 mcg tablet Take 1 tablet (88 mcg total) by mouth daily in the early morning 90 tablet 1    metoprolol tartrate (LOPRESSOR) 25 mg tablet Take 0.5 tablets (12.5 mg total) by mouth every 12 (twelve) hours 90 tablet 2    multivitamin (THERAGRAN) TABS Take 1 tablet by mouth daily      pantoprazole (PROTONIX) 40 mg tablet TAKE 1 TABLET (40 MG TOTAL) BY MOUTH DAILY 90 tablet 1    polyethylene glycol (MIRALAX) 17 g packet Take 17 g by mouth daily as needed (constipation) (Patient not taking: Reported on 9/23/2024) 20 each 0    Proventil  (90 Base) MCG/ACT inhaler Inhale 2 puffs every 6 (six) hours as needed for wheezing 18 g 5    QUEtiapine (SEROquel) 25 mg tablet Take 25 mg by mouth daily at bedtime as needed  (for sleep)      vitamin B-12 (VITAMIN B-12) 1,000 mcg tablet Take 1 tablet (1,000 mcg total) by mouth daily 30 tablet 0     No current facility-administered medications for this visit.

## 2025-05-28 ENCOUNTER — APPOINTMENT (OUTPATIENT)
Dept: LAB | Facility: CLINIC | Age: 65
End: 2025-05-28
Payer: COMMERCIAL

## 2025-05-28 ENCOUNTER — CLINICAL SUPPORT (OUTPATIENT)
Dept: PULMONOLOGY | Facility: CLINIC | Age: 65
End: 2025-05-28
Attending: NURSE PRACTITIONER
Payer: COMMERCIAL

## 2025-05-28 DIAGNOSIS — J43.1 PANLOBULAR EMPHYSEMA (HCC): Primary | ICD-10-CM

## 2025-05-28 DIAGNOSIS — E03.9 HYPOTHYROIDISM, UNSPECIFIED TYPE: Primary | ICD-10-CM

## 2025-05-28 LAB
T4 FREE SERPL-MCNC: 0.81 NG/DL (ref 0.61–1.12)
TSH SERPL DL<=0.05 MIU/L-ACNC: 28.39 UIU/ML (ref 0.45–4.5)

## 2025-05-28 PROCEDURE — 94625 PHY/QHP OP PULM RHB W/O MNTR: CPT

## 2025-05-28 PROCEDURE — 84439 ASSAY OF FREE THYROXINE: CPT

## 2025-05-28 PROCEDURE — 84443 ASSAY THYROID STIM HORMONE: CPT

## 2025-05-28 PROCEDURE — 36415 COLL VENOUS BLD VENIPUNCTURE: CPT

## 2025-05-30 ENCOUNTER — CLINICAL SUPPORT (OUTPATIENT)
Dept: PULMONOLOGY | Facility: CLINIC | Age: 65
End: 2025-05-30
Attending: NURSE PRACTITIONER
Payer: COMMERCIAL

## 2025-05-30 DIAGNOSIS — J43.1 PANLOBULAR EMPHYSEMA (HCC): Primary | ICD-10-CM

## 2025-05-30 PROCEDURE — 94625 PHY/QHP OP PULM RHB W/O MNTR: CPT

## 2025-06-02 ENCOUNTER — OFFICE VISIT (OUTPATIENT)
Age: 65
End: 2025-06-02

## 2025-06-02 ENCOUNTER — APPOINTMENT (OUTPATIENT)
Dept: PULMONOLOGY | Facility: CLINIC | Age: 65
End: 2025-06-02
Attending: NURSE PRACTITIONER
Payer: COMMERCIAL

## 2025-06-02 ENCOUNTER — RESULTS FOLLOW-UP (OUTPATIENT)
Age: 65
End: 2025-06-02

## 2025-06-02 VITALS
BODY MASS INDEX: 26.61 KG/M2 | HEIGHT: 59 IN | DIASTOLIC BLOOD PRESSURE: 80 MMHG | OXYGEN SATURATION: 95 % | RESPIRATION RATE: 17 BRPM | TEMPERATURE: 97.2 F | SYSTOLIC BLOOD PRESSURE: 122 MMHG | HEART RATE: 61 BPM | WEIGHT: 132 LBS

## 2025-06-02 DIAGNOSIS — R53.83 FATIGUE, UNSPECIFIED TYPE: ICD-10-CM

## 2025-06-02 DIAGNOSIS — E03.9 ACQUIRED HYPOTHYROIDISM: ICD-10-CM

## 2025-06-02 DIAGNOSIS — I10 PRIMARY HYPERTENSION: Primary | ICD-10-CM

## 2025-06-02 DIAGNOSIS — K59.00 CONSTIPATION: ICD-10-CM

## 2025-06-02 DIAGNOSIS — F17.219 CIGARETTE NICOTINE DEPENDENCE WITH NICOTINE-INDUCED DISORDER: ICD-10-CM

## 2025-06-02 DIAGNOSIS — Z91.09 ALLERGY TO ENVIRONMENTAL FACTORS: ICD-10-CM

## 2025-06-02 PROCEDURE — 99213 OFFICE O/P EST LOW 20 MIN: CPT | Performed by: FAMILY MEDICINE

## 2025-06-02 PROCEDURE — G2211 COMPLEX E/M VISIT ADD ON: HCPCS | Performed by: FAMILY MEDICINE

## 2025-06-02 PROCEDURE — 99406 BEHAV CHNG SMOKING 3-10 MIN: CPT | Performed by: FAMILY MEDICINE

## 2025-06-02 RX ORDER — POLYETHYLENE GLYCOL 3350 17 G/17G
17 POWDER, FOR SOLUTION ORAL DAILY PRN
Qty: 30 EACH | Refills: 0 | Status: SHIPPED | OUTPATIENT
Start: 2025-06-02

## 2025-06-02 RX ORDER — MAGNESIUM 30 MG
30 TABLET ORAL 2 TIMES DAILY
COMMUNITY

## 2025-06-02 RX ORDER — DOCUSATE SODIUM 100 MG/1
100 CAPSULE, LIQUID FILLED ORAL DAILY
Qty: 90 CAPSULE | Refills: 2 | Status: SHIPPED | OUTPATIENT
Start: 2025-06-02

## 2025-06-02 RX ORDER — FEXOFENADINE HCL 60 MG/1
60 TABLET, FILM COATED ORAL DAILY
COMMUNITY
End: 2025-06-02 | Stop reason: SDUPTHER

## 2025-06-02 RX ORDER — ASCORBIC ACID 500 MG
500 TABLET ORAL DAILY
COMMUNITY

## 2025-06-02 RX ORDER — FEXOFENADINE HCL 60 MG/1
60 TABLET, FILM COATED ORAL DAILY
Qty: 90 TABLET | Refills: 2 | Status: SHIPPED | OUTPATIENT
Start: 2025-06-02

## 2025-06-02 RX ORDER — FLUTICASONE PROPIONATE 50 MCG
1 SPRAY, SUSPENSION (ML) NASAL DAILY
Qty: 16 G | Refills: 3 | Status: SHIPPED | OUTPATIENT
Start: 2025-06-02

## 2025-06-02 NOTE — ASSESSMENT & PLAN NOTE
Smoke cessation counseling given. Patient is currently using nicotine patch and Patient is reducing number of the cigarettes slowly.    Continue nicotine patch

## 2025-06-02 NOTE — ASSESSMENT & PLAN NOTE
Orders:  •  fexofenadine (ALLEGRA) 60 MG tablet; Take 1 tablet (60 mg total) by mouth daily  •  fluticasone (FLONASE) 50 mcg/act nasal spray; 1 spray into each nostril daily

## 2025-06-02 NOTE — ASSESSMENT & PLAN NOTE
Patient's BP well controlled today with metoprolol 12.5mg BID.    Will continue current dosage   Orders:  •  Comprehensive metabolic panel; Future

## 2025-06-02 NOTE — ASSESSMENT & PLAN NOTE
Patient is currently on levothyroxine 88mcg. Patient's T4 was normal and TSH elevated. TSH elevation most likely due to current stress that Patient has to get a tooth extraction.    Will continue levothyroxine 88mcg for now since T4 level is normal and recheck TSH and T4 in 6 months.

## 2025-06-02 NOTE — PROGRESS NOTES
Name: Iona Toribio      : 1960      MRN: [id number]  Encounter Provider: Alice Handley MD  Encounter Date: 2025   Encounter department: [facility]  :  Assessment & Plan  Primary hypertension  Patient's BP well controlled today with metoprolol 12.5mg BID.    Will continue current dosage   Orders:  •  Comprehensive metabolic panel; Future    Acquired hypothyroidism  Patient is currently on levothyroxine 88mcg. Patient's T4 was normal and TSH elevated. TSH elevation most likely due to current stress that Patient has to get a tooth extraction.    Will continue levothyroxine 88mcg for now since T4 level is normal and recheck TSH and T4 in 6 months.        Constipation  Patient is having constipation worse than usual.    Will continue colace 100 mg HS and add miralax daily PRN for constipation   Orders:  •  docusate sodium (COLACE) 100 mg capsule; Take 1 capsule (100 mg total) by mouth before bedtime. Pt verbalized she takes this once daily.  •  polyethylene glycol (MIRALAX) 17 g packet; Take 17 g by mouth daily as needed (constipation)    Allergy to environmental factors    Orders:  •  fexofenadine (ALLEGRA) 60 MG tablet; Take 1 tablet (60 mg total) by mouth daily  •  fluticasone (FLONASE) 50 mcg/act nasal spray; 1 spray into each nostril daily    Fatigue, unspecified type    Orders:  •  Comprehensive metabolic panel; Future  •  CBC and differential; Future  •  Hemoglobin A1C; Future    Cigarette nicotine dependence with nicotine-induced disorder [F17.219]  Smoke cessation counseling given. Patient is currently using nicotine patch and Patient is reducing number of the cigarettes slowly.    Continue nicotine patch             Depression Screening and Follow-up Plan: Patient was screened for depression during today's encounter. They screened negative with a PHQ-9 score of 3.      Tobacco Cessation Counseling: Tobacco cessation counseling was provided. The patient is  "sincerely urged to quit consumption of tobacco. She is ready to quit tobacco. Medication options and side effects of medication discussed. Patient agreed to medication. Nicotine patch was prescribed.       History of Present Illness {?Quick Links Encounters * My Last Note * Last Note in Specialty * Snapshot * Since Last Visit * History :[id number]}  Physical Therapy is here for chronic disease follow up      Review of Systems   Constitutional:  Negative for chills and fever.   HENT:  Positive for congestion. Negative for ear pain and sore throat.    Eyes:  Negative for pain and visual disturbance.   Respiratory:  Negative for cough and shortness of breath.    Cardiovascular:  Positive for leg swelling. Negative for chest pain and palpitations.   Gastrointestinal:  Negative for abdominal pain, constipation, diarrhea, nausea and vomiting.   Genitourinary:  Negative for dysuria and hematuria.   Musculoskeletal:  Negative for arthralgias, back pain, neck pain and neck stiffness.   Skin:  Negative for color change and rash.   Neurological:  Negative for dizziness, weakness and headaches.   Psychiatric/Behavioral:  Negative for agitation and confusion. The patient is not nervous/anxious.    All other systems reviewed and are negative.      Objective {?Quick Links Trend Vitals * Enter New Vitals * Results Review * Timeline (Adult) * Labs * Imaging * Cardiology * Procedures * Lung Cancer Screening * Surgical eConsent :[id number]}  /80   Pulse 61   Temp (!) 97.2 °F (36.2 °C)   Resp 17   Ht 4' 11\" (1.499 m)   Wt 59.9 kg (132 lb)   SpO2 95%   BMI 26.66 kg/m²      Physical Exam  Vitals and nursing note reviewed.   Constitutional:       General: She is not in acute distress.     Appearance: Normal appearance. She is well-developed. She is not ill-appearing.   HENT:      Head: Normocephalic and atraumatic.      Right Ear: Ear canal and external ear normal. There is impacted cerumen.      Left Ear: Tympanic membrane, ear canal " and external ear normal. There is no impacted cerumen.      Ears:      Comments: Mild fluid behind the TM, no signs of infection     Nose: Nose normal. No congestion or rhinorrhea.      Mouth/Throat:      Mouth: Mucous membranes are moist.      Pharynx: Oropharynx is clear. No oropharyngeal exudate or posterior oropharyngeal erythema.     Eyes:      General:         Right eye: No discharge.         Left eye: No discharge.      Conjunctiva/sclera: Conjunctivae normal.       Cardiovascular:      Rate and Rhythm: Normal rate and regular rhythm.      Pulses: Normal pulses.      Heart sounds: Normal heart sounds. No murmur heard.     No friction rub. No gallop.   Pulmonary:      Effort: Pulmonary effort is normal. No respiratory distress.      Breath sounds: Normal breath sounds. No stridor. No wheezing, rhonchi or rales.   Chest:      Chest wall: No tenderness.   Abdominal:      General: Abdomen is flat. Bowel sounds are normal. There is no distension.      Palpations: Abdomen is soft.      Tenderness: There is no abdominal tenderness. There is no guarding.     Musculoskeletal:         General: Deformity (chronic mild kyphosis) present. No swelling, tenderness or signs of injury. Normal range of motion.      Cervical back: Normal range of motion and neck supple. No rigidity or tenderness.      Right lower leg: No edema.      Left lower leg: No edema.   Lymphadenopathy:      Cervical: No cervical adenopathy.     Skin:     General: Skin is warm and dry.      Capillary Refill: Capillary refill takes less than 2 seconds.      Findings: No bruising, erythema, lesion or rash.     Neurological:      General: No focal deficit present.      Mental Status: She is alert and oriented to person, place, and time. Mental status is at baseline.      Motor: No weakness.      Gait: Gait normal.      Deep Tendon Reflexes: Reflexes normal.     Psychiatric:         Mood and Affect: Mood normal.         Behavior: Behavior normal.          Thought Content: Thought content normal.

## 2025-06-04 ENCOUNTER — CLINICAL SUPPORT (OUTPATIENT)
Dept: PULMONOLOGY | Facility: CLINIC | Age: 65
End: 2025-06-04
Attending: NURSE PRACTITIONER
Payer: COMMERCIAL

## 2025-06-04 DIAGNOSIS — J43.1 PANLOBULAR EMPHYSEMA (HCC): Primary | ICD-10-CM

## 2025-06-04 PROCEDURE — 94625 PHY/QHP OP PULM RHB W/O MNTR: CPT

## 2025-06-06 ENCOUNTER — APPOINTMENT (OUTPATIENT)
Dept: PULMONOLOGY | Facility: CLINIC | Age: 65
End: 2025-06-06
Attending: NURSE PRACTITIONER
Payer: COMMERCIAL

## 2025-06-09 ENCOUNTER — APPOINTMENT (OUTPATIENT)
Dept: PULMONOLOGY | Facility: CLINIC | Age: 65
End: 2025-06-09
Attending: NURSE PRACTITIONER
Payer: COMMERCIAL

## 2025-06-11 ENCOUNTER — APPOINTMENT (OUTPATIENT)
Dept: PULMONOLOGY | Facility: CLINIC | Age: 65
End: 2025-06-11
Attending: NURSE PRACTITIONER
Payer: COMMERCIAL

## 2025-06-13 ENCOUNTER — APPOINTMENT (OUTPATIENT)
Dept: PULMONOLOGY | Facility: CLINIC | Age: 65
End: 2025-06-13
Attending: NURSE PRACTITIONER
Payer: COMMERCIAL

## 2025-06-16 ENCOUNTER — TELEPHONE (OUTPATIENT)
Age: 65
End: 2025-06-16

## 2025-06-23 DIAGNOSIS — Z71.6 ENCOUNTER FOR TOBACCO USE CESSATION COUNSELING: ICD-10-CM

## 2025-06-23 RX ORDER — BUPROPION HYDROCHLORIDE 150 MG/1
150 TABLET, EXTENDED RELEASE ORAL 2 TIMES DAILY
Qty: 180 TABLET | Refills: 0 | Status: SHIPPED | OUTPATIENT
Start: 2025-06-23

## 2025-06-23 NOTE — TELEPHONE ENCOUNTER
Vm on rx line:    bryan wilkes august 1st 1960 i need bupropion HCLSR a 150 milligram tablet quantity 120 to go to [facility] [phone] and my number is [phone] thank you bye bye  You received a voice mail from MELRIN WILKES.

## 2025-06-25 ENCOUNTER — APPOINTMENT (OUTPATIENT)
Dept: PULMONOLOGY | Facility: CLINIC | Age: 65
End: 2025-06-25
Attending: NURSE PRACTITIONER
Payer: COMMERCIAL

## 2025-06-27 ENCOUNTER — APPOINTMENT (OUTPATIENT)
Dept: PULMONOLOGY | Facility: CLINIC | Age: 65
End: 2025-06-27
Attending: NURSE PRACTITIONER
Payer: COMMERCIAL

## 2025-06-30 ENCOUNTER — APPOINTMENT (OUTPATIENT)
Dept: PULMONOLOGY | Facility: CLINIC | Age: 65
End: 2025-06-30
Attending: NURSE PRACTITIONER
Payer: COMMERCIAL

## 2025-07-11 ENCOUNTER — TELEPHONE (OUTPATIENT)
Dept: CARDIAC REHAB | Facility: CLINIC | Age: 65
End: 2025-07-11

## 2025-08-04 ENCOUNTER — OFFICE VISIT (OUTPATIENT)
Age: 65
End: 2025-08-04

## 2025-08-04 VITALS
HEART RATE: 62 BPM | OXYGEN SATURATION: 94 % | SYSTOLIC BLOOD PRESSURE: 149 MMHG | DIASTOLIC BLOOD PRESSURE: 90 MMHG | HEIGHT: 58 IN | BODY MASS INDEX: 27.71 KG/M2 | RESPIRATION RATE: 16 BRPM | WEIGHT: 132 LBS | TEMPERATURE: 97.7 F

## 2025-08-04 DIAGNOSIS — R60.0 LOCALIZED EDEMA: Primary | ICD-10-CM

## 2025-08-04 DIAGNOSIS — I10 PRIMARY HYPERTENSION: ICD-10-CM

## 2025-08-04 DIAGNOSIS — Z23 ENCOUNTER FOR IMMUNIZATION: ICD-10-CM

## 2025-08-04 DIAGNOSIS — K59.04 CHRONIC IDIOPATHIC CONSTIPATION: ICD-10-CM

## 2025-08-04 DIAGNOSIS — E03.9 ACQUIRED HYPOTHYROIDISM: ICD-10-CM

## 2025-08-04 PROCEDURE — 90471 IMMUNIZATION ADMIN: CPT | Performed by: FAMILY MEDICINE

## 2025-08-04 PROCEDURE — 99214 OFFICE O/P EST MOD 30 MIN: CPT | Performed by: FAMILY MEDICINE

## 2025-08-04 PROCEDURE — 90750 HZV VACC RECOMBINANT IM: CPT | Performed by: FAMILY MEDICINE

## 2025-08-04 PROCEDURE — G2211 COMPLEX E/M VISIT ADD ON: HCPCS | Performed by: FAMILY MEDICINE

## 2025-08-04 RX ORDER — SENNOSIDES 8.6 MG/1
1 TABLET ORAL DAILY
Qty: 30 TABLET | Refills: 0 | Status: SHIPPED | OUTPATIENT
Start: 2025-08-04

## 2025-08-05 ENCOUNTER — TRANSCRIBE ORDERS (OUTPATIENT)
Dept: LAB | Facility: CLINIC | Age: 65
End: 2025-08-05

## 2025-08-05 ENCOUNTER — APPOINTMENT (OUTPATIENT)
Dept: LAB | Facility: CLINIC | Age: 65
End: 2025-08-05
Payer: COMMERCIAL

## 2025-08-05 DIAGNOSIS — E03.9 ACQUIRED HYPOTHYROIDISM: ICD-10-CM

## 2025-08-05 DIAGNOSIS — R53.83 FATIGUE, UNSPECIFIED TYPE: ICD-10-CM

## 2025-08-05 DIAGNOSIS — I10 PRIMARY HYPERTENSION: ICD-10-CM

## 2025-08-05 LAB
ALBUMIN SERPL BCG-MCNC: 4.1 G/DL (ref 3.5–5)
ALP SERPL-CCNC: 64 U/L (ref 34–104)
ALT SERPL W P-5'-P-CCNC: 22 U/L (ref 7–52)
ANION GAP SERPL CALCULATED.3IONS-SCNC: 7 MMOL/L (ref 4–13)
AST SERPL W P-5'-P-CCNC: 24 U/L (ref 13–39)
BASOPHILS # BLD AUTO: 0.06 THOUSANDS/ÂΜL (ref 0–0.1)
BASOPHILS NFR BLD AUTO: 1 % (ref 0–1)
BILIRUB SERPL-MCNC: 0.52 MG/DL (ref 0.2–1)
BUN SERPL-MCNC: 11 MG/DL (ref 5–25)
CALCIUM SERPL-MCNC: 9.1 MG/DL (ref 8.4–10.2)
CHLORIDE SERPL-SCNC: 101 MMOL/L (ref 96–108)
CO2 SERPL-SCNC: 31 MMOL/L (ref 21–32)
CREAT SERPL-MCNC: 0.8 MG/DL (ref 0.6–1.3)
EOSINOPHIL # BLD AUTO: 0.19 THOUSAND/ÂΜL (ref 0–0.61)
EOSINOPHIL NFR BLD AUTO: 2 % (ref 0–6)
ERYTHROCYTE [DISTWIDTH] IN BLOOD BY AUTOMATED COUNT: 14 % (ref 11.6–15.1)
EST. AVERAGE GLUCOSE BLD GHB EST-MCNC: 117 MG/DL
GFR SERPL CREATININE-BSD FRML MDRD: 78 ML/MIN/1.73SQ M
GLUCOSE P FAST SERPL-MCNC: 76 MG/DL (ref 65–99)
HBA1C MFR BLD: 5.7 %
HCT VFR BLD AUTO: 40 % (ref 34.8–46.1)
HGB BLD-MCNC: 12.7 G/DL (ref 11.5–15.4)
IMM GRANULOCYTES # BLD AUTO: 0.03 THOUSAND/UL (ref 0–0.2)
IMM GRANULOCYTES NFR BLD AUTO: 0 % (ref 0–2)
LYMPHOCYTES # BLD AUTO: 1.42 THOUSANDS/ÂΜL (ref 0.6–4.47)
LYMPHOCYTES NFR BLD AUTO: 18 % (ref 14–44)
MCH RBC QN AUTO: 29.5 PG (ref 26.8–34.3)
MCHC RBC AUTO-ENTMCNC: 31.8 G/DL (ref 31.4–37.4)
MCV RBC AUTO: 93 FL (ref 82–98)
MONOCYTES # BLD AUTO: 0.46 THOUSAND/ÂΜL (ref 0.17–1.22)
MONOCYTES NFR BLD AUTO: 6 % (ref 4–12)
NEUTROPHILS # BLD AUTO: 5.62 THOUSANDS/ÂΜL (ref 1.85–7.62)
NEUTS SEG NFR BLD AUTO: 73 % (ref 43–75)
NRBC BLD AUTO-RTO: 0 /100 WBCS
PLATELET # BLD AUTO: 268 THOUSANDS/UL (ref 149–390)
PMV BLD AUTO: 9.8 FL (ref 8.9–12.7)
POTASSIUM SERPL-SCNC: 3.8 MMOL/L (ref 3.5–5.3)
PROT SERPL-MCNC: 6.4 G/DL (ref 6.4–8.4)
RBC # BLD AUTO: 4.31 MILLION/UL (ref 3.81–5.12)
SODIUM SERPL-SCNC: 139 MMOL/L (ref 135–147)
T4 FREE SERPL-MCNC: 0.79 NG/DL (ref 0.61–1.12)
TSH SERPL DL<=0.05 MIU/L-ACNC: 21.83 UIU/ML (ref 0.45–4.5)
WBC # BLD AUTO: 7.78 THOUSAND/UL (ref 4.31–10.16)

## 2025-08-05 PROCEDURE — 36415 COLL VENOUS BLD VENIPUNCTURE: CPT

## 2025-08-05 PROCEDURE — 83036 HEMOGLOBIN GLYCOSYLATED A1C: CPT

## 2025-08-05 PROCEDURE — 80053 COMPREHEN METABOLIC PANEL: CPT

## 2025-08-05 PROCEDURE — 85025 COMPLETE CBC W/AUTO DIFF WBC: CPT

## 2025-08-05 PROCEDURE — 84443 ASSAY THYROID STIM HORMONE: CPT

## 2025-08-05 PROCEDURE — 84439 ASSAY OF FREE THYROXINE: CPT

## 2025-08-07 DIAGNOSIS — E03.9 ACQUIRED HYPOTHYROIDISM: ICD-10-CM

## 2025-08-07 RX ORDER — LEVOTHYROXINE SODIUM 100 UG/1
100 TABLET ORAL
Qty: 30 TABLET | Refills: 1 | Status: SHIPPED | OUTPATIENT
Start: 2025-08-07

## (undated) DEVICE — CATH F4 INF JR 3.5 100CM: Brand: INFINITI

## (undated) DEVICE — GLIDESHEATH SLENDER STAINLESS STEEL KIT: Brand: GLIDESHEATH SLENDER

## (undated) DEVICE — CATH F4 INF JL 3.5 100CM: Brand: INFINITI

## (undated) DEVICE — Device: Brand: ASAHI SILVERWAY

## (undated) DEVICE — PACK CUSTOM C V DRAPE SCV11CDSLA

## (undated) DEVICE — GUIDEWIRE .035 260CM 3MM J TIP